# Patient Record
Sex: FEMALE | Race: WHITE | Employment: FULL TIME | URBAN - METROPOLITAN AREA
[De-identification: names, ages, dates, MRNs, and addresses within clinical notes are randomized per-mention and may not be internally consistent; named-entity substitution may affect disease eponyms.]

---

## 2017-01-16 ENCOUNTER — HOSPITAL ENCOUNTER (EMERGENCY)
Age: 57
Discharge: HOME | End: 2017-01-16
Attending: EMERGENCY MEDICINE
Payer: COMMERCIAL

## 2017-01-16 ENCOUNTER — APPOINTMENT (OUTPATIENT)
Dept: GENERAL RADIOLOGY | Age: 57
End: 2017-01-16
Payer: COMMERCIAL

## 2017-01-16 ENCOUNTER — APPOINTMENT (OUTPATIENT)
Dept: CT IMAGING | Age: 57
End: 2017-01-16
Attending: EMERGENCY MEDICINE
Payer: COMMERCIAL

## 2017-01-16 VITALS
RESPIRATION RATE: 16 BRPM | BODY MASS INDEX: 29.99 KG/M2 | OXYGEN SATURATION: 98 % | WEIGHT: 180 LBS | DIASTOLIC BLOOD PRESSURE: 98 MMHG | HEART RATE: 67 BPM | HEIGHT: 65 IN | SYSTOLIC BLOOD PRESSURE: 160 MMHG

## 2017-01-16 DIAGNOSIS — R42 DIZZINESS: Primary | ICD-10-CM

## 2017-01-16 DIAGNOSIS — I10 ESSENTIAL HYPERTENSION: ICD-10-CM

## 2017-01-16 LAB
ALBUMIN SERPL BCP-MCNC: 3.4 G/DL (ref 3.5–5)
ALBUMIN/GLOB SERPL: 1 {RATIO} (ref 1.1–2.2)
ALP SERPL-CCNC: 95 U/L (ref 45–117)
ALT SERPL-CCNC: 39 U/L (ref 12–78)
ANION GAP BLD CALC-SCNC: 7 MMOL/L (ref 5–15)
APPEARANCE UR: ABNORMAL
AST SERPL W P-5'-P-CCNC: 14 U/L (ref 15–37)
BACTERIA URNS QL MICRO: NEGATIVE /HPF
BASOPHILS # BLD AUTO: 0 K/UL (ref 0–0.1)
BASOPHILS # BLD: 1 % (ref 0–1)
BILIRUB SERPL-MCNC: 0.2 MG/DL (ref 0.2–1)
BILIRUB UR QL: NEGATIVE
BUN SERPL-MCNC: 24 MG/DL (ref 6–20)
BUN/CREAT SERPL: 19 (ref 12–20)
CALCIUM SERPL-MCNC: 8.5 MG/DL (ref 8.5–10.1)
CHLORIDE SERPL-SCNC: 107 MMOL/L (ref 97–108)
CK SERPL-CCNC: 37 U/L (ref 26–192)
CO2 SERPL-SCNC: 28 MMOL/L (ref 21–32)
COLOR UR: ABNORMAL
CREAT SERPL-MCNC: 1.29 MG/DL (ref 0.55–1.02)
D DIMER PPP FEU-MCNC: 0.19 MG/L FEU (ref 0–0.65)
EOSINOPHIL # BLD: 0.1 K/UL (ref 0–0.4)
EOSINOPHIL NFR BLD: 2 % (ref 0–7)
EPITH CASTS URNS QL MICRO: ABNORMAL /LPF
ERYTHROCYTE [DISTWIDTH] IN BLOOD BY AUTOMATED COUNT: 13.1 % (ref 11.5–14.5)
GLOBULIN SER CALC-MCNC: 3.4 G/DL (ref 2–4)
GLUCOSE SERPL-MCNC: 96 MG/DL (ref 65–100)
GLUCOSE UR STRIP.AUTO-MCNC: NEGATIVE MG/DL
HCT VFR BLD AUTO: 41.1 % (ref 35–47)
HGB BLD-MCNC: 13.6 G/DL (ref 11.5–16)
HGB UR QL STRIP: NEGATIVE
HYALINE CASTS URNS QL MICRO: ABNORMAL /LPF (ref 0–5)
KETONES UR QL STRIP.AUTO: NEGATIVE MG/DL
LEUKOCYTE ESTERASE UR QL STRIP.AUTO: ABNORMAL
LYMPHOCYTES # BLD AUTO: 29 % (ref 12–49)
LYMPHOCYTES # BLD: 1.9 K/UL (ref 0.8–3.5)
MCH RBC QN AUTO: 30.1 PG (ref 26–34)
MCHC RBC AUTO-ENTMCNC: 33.1 G/DL (ref 30–36.5)
MCV RBC AUTO: 90.9 FL (ref 80–99)
MONOCYTES # BLD: 0.4 K/UL (ref 0–1)
MONOCYTES NFR BLD AUTO: 6 % (ref 5–13)
NEUTS SEG # BLD: 4.1 K/UL (ref 1.8–8)
NEUTS SEG NFR BLD AUTO: 62 % (ref 32–75)
NITRITE UR QL STRIP.AUTO: NEGATIVE
PH UR STRIP: 6.5 [PH] (ref 5–8)
PLATELET # BLD AUTO: 203 K/UL (ref 150–400)
POTASSIUM SERPL-SCNC: 4.2 MMOL/L (ref 3.5–5.1)
PROT SERPL-MCNC: 6.8 G/DL (ref 6.4–8.2)
PROT UR STRIP-MCNC: NEGATIVE MG/DL
RBC # BLD AUTO: 4.52 M/UL (ref 3.8–5.2)
RBC #/AREA URNS HPF: ABNORMAL /HPF (ref 0–5)
SODIUM SERPL-SCNC: 142 MMOL/L (ref 136–145)
SP GR UR REFRACTOMETRY: 1.01 (ref 1–1.03)
TROPONIN I SERPL-MCNC: <0.04 NG/ML
UA: UC IF INDICATED,UAUC: ABNORMAL
UROBILINOGEN UR QL STRIP.AUTO: 1 EU/DL (ref 0.2–1)
WBC # BLD AUTO: 6.5 K/UL (ref 3.6–11)
WBC URNS QL MICRO: ABNORMAL /HPF (ref 0–4)

## 2017-01-16 PROCEDURE — 84484 ASSAY OF TROPONIN QUANT: CPT | Performed by: EMERGENCY MEDICINE

## 2017-01-16 PROCEDURE — 74011250637 HC RX REV CODE- 250/637: Performed by: EMERGENCY MEDICINE

## 2017-01-16 PROCEDURE — 96361 HYDRATE IV INFUSION ADD-ON: CPT

## 2017-01-16 PROCEDURE — 87086 URINE CULTURE/COLONY COUNT: CPT | Performed by: EMERGENCY MEDICINE

## 2017-01-16 PROCEDURE — 93005 ELECTROCARDIOGRAM TRACING: CPT

## 2017-01-16 PROCEDURE — 74011250636 HC RX REV CODE- 250/636: Performed by: EMERGENCY MEDICINE

## 2017-01-16 PROCEDURE — 71020 XR CHEST PA LAT: CPT

## 2017-01-16 PROCEDURE — 85025 COMPLETE CBC W/AUTO DIFF WBC: CPT | Performed by: EMERGENCY MEDICINE

## 2017-01-16 PROCEDURE — 85379 FIBRIN DEGRADATION QUANT: CPT | Performed by: EMERGENCY MEDICINE

## 2017-01-16 PROCEDURE — 80053 COMPREHEN METABOLIC PANEL: CPT | Performed by: EMERGENCY MEDICINE

## 2017-01-16 PROCEDURE — 36415 COLL VENOUS BLD VENIPUNCTURE: CPT | Performed by: EMERGENCY MEDICINE

## 2017-01-16 PROCEDURE — 82550 ASSAY OF CK (CPK): CPT | Performed by: EMERGENCY MEDICINE

## 2017-01-16 PROCEDURE — 70450 CT HEAD/BRAIN W/O DYE: CPT

## 2017-01-16 PROCEDURE — 81001 URINALYSIS AUTO W/SCOPE: CPT | Performed by: EMERGENCY MEDICINE

## 2017-01-16 PROCEDURE — 96374 THER/PROPH/DIAG INJ IV PUSH: CPT

## 2017-01-16 PROCEDURE — 99284 EMERGENCY DEPT VISIT MOD MDM: CPT

## 2017-01-16 RX ORDER — ACETAMINOPHEN 325 MG/1
TABLET ORAL
Status: DISCONTINUED
Start: 2017-01-16 | End: 2017-01-17 | Stop reason: HOSPADM

## 2017-01-16 RX ORDER — ONDANSETRON 2 MG/ML
4 INJECTION INTRAMUSCULAR; INTRAVENOUS
Status: COMPLETED | OUTPATIENT
Start: 2017-01-16 | End: 2017-01-16

## 2017-01-16 RX ORDER — MECLIZINE HCL 12.5 MG 12.5 MG/1
25 TABLET ORAL
Status: COMPLETED | OUTPATIENT
Start: 2017-01-16 | End: 2017-01-16

## 2017-01-16 RX ORDER — DULOXETIN HYDROCHLORIDE 30 MG/1
120 CAPSULE, DELAYED RELEASE ORAL DAILY
COMMUNITY
End: 2019-08-08 | Stop reason: SDUPTHER

## 2017-01-16 RX ORDER — MECLIZINE HYDROCHLORIDE 25 MG/1
25 TABLET ORAL
Qty: 30 TAB | Refills: 0 | Status: SHIPPED | OUTPATIENT
Start: 2017-01-16 | End: 2017-01-17

## 2017-01-16 RX ORDER — ONDANSETRON 4 MG/1
4 TABLET, ORALLY DISINTEGRATING ORAL
Qty: 10 TAB | Refills: 0 | Status: SHIPPED | OUTPATIENT
Start: 2017-01-16 | End: 2017-01-17

## 2017-01-16 RX ORDER — ACETAMINOPHEN 500 MG
1000 TABLET ORAL
Status: COMPLETED | OUTPATIENT
Start: 2017-01-16 | End: 2017-01-16

## 2017-01-16 RX ADMIN — MECLIZINE 25 MG: 12.5 TABLET ORAL at 18:52

## 2017-01-16 RX ADMIN — SODIUM CHLORIDE 1000 ML: 900 INJECTION, SOLUTION INTRAVENOUS at 18:52

## 2017-01-16 RX ADMIN — ACETAMINOPHEN 1000 MG: 500 TABLET ORAL at 17:26

## 2017-01-16 RX ADMIN — ONDANSETRON 4 MG: 2 INJECTION INTRAMUSCULAR; INTRAVENOUS at 18:52

## 2017-01-17 ENCOUNTER — OFFICE VISIT (OUTPATIENT)
Dept: NEUROLOGY | Age: 57
End: 2017-01-17

## 2017-01-17 VITALS
SYSTOLIC BLOOD PRESSURE: 140 MMHG | BODY MASS INDEX: 31.36 KG/M2 | HEIGHT: 65 IN | DIASTOLIC BLOOD PRESSURE: 80 MMHG | WEIGHT: 188.2 LBS | OXYGEN SATURATION: 98 % | HEART RATE: 67 BPM

## 2017-01-17 DIAGNOSIS — I10 ESSENTIAL HYPERTENSION: ICD-10-CM

## 2017-01-17 DIAGNOSIS — E53.8 VITAMIN B12 DEFICIENCY: ICD-10-CM

## 2017-01-17 DIAGNOSIS — F41.8 DEPRESSION WITH ANXIETY: ICD-10-CM

## 2017-01-17 DIAGNOSIS — R41.3 MEMORY LOSS: Primary | ICD-10-CM

## 2017-01-17 DIAGNOSIS — R55 SYNCOPE, UNSPECIFIED SYNCOPE TYPE: ICD-10-CM

## 2017-01-17 LAB
ATRIAL RATE: 76 BPM
CALCULATED P AXIS, ECG09: 66 DEGREES
CALCULATED R AXIS, ECG10: 38 DEGREES
CALCULATED T AXIS, ECG11: 51 DEGREES
DIAGNOSIS, 93000: NORMAL
P-R INTERVAL, ECG05: 158 MS
Q-T INTERVAL, ECG07: 416 MS
QRS DURATION, ECG06: 104 MS
QTC CALCULATION (BEZET), ECG08: 468 MS
VENTRICULAR RATE, ECG03: 76 BPM

## 2017-01-17 RX ORDER — QUETIAPINE FUMARATE 50 MG/1
50 TABLET, FILM COATED ORAL
COMMUNITY
End: 2019-06-07

## 2017-01-17 NOTE — MR AVS SNAPSHOT
Visit Information Date & Time Provider Department Dept. Phone Encounter #  
 1/17/2017 11:00 AM Mg Ramírez NP [facility] [phone] [id number] Upcoming Health Maintenance Date Due Hepatitis C Screening 1960 DTaP/Tdap/Td series (1 - Tdap) 4/22/1981 FOBT Q 1 YEAR AGE 50-75 4/22/2010 BREAST CANCER SCRN MAMMOGRAM 4/3/2011 PAP AKA CERVICAL CYTOLOGY 5/15/2014 INFLUENZA AGE 9 TO ADULT 8/1/2016 Allergies as of 1/17/2017  Review Complete On: 1/17/2017 By: Mayank Burger Severity Noted Reaction Type Reactions Lisinopril  03/25/2014    Cough Lortab [Hydrocodone-acetaminophen]  07/10/2012    Itching Morphine  10/06/2011    Nausea and Vomiting Current Immunizations  Reviewed on 3/25/2014 Name Date Influenza Vaccine Split 10/15/2012 Not reviewed this visit You Were Diagnosed With   
  
 Codes Comments Memory loss    -  Primary ICD-10-CM: R41.3 ICD-9-CM: 780.93 Spells     ICD-10-CM: R68.89 ICD-9-CM: 780.99 Vitamin B12 deficiency     ICD-10-CM: E53.8 ICD-9-CM: 266.2 Vitals BP Pulse Height(growth percentile) Weight(growth percentile) SpO2 BMI  
 140/80 67 5' 5\" (1.651 m) 188 lb 3.2 oz (85.4 kg) 98% 31.32 kg/m2 OB Status Smoking Status Ablation Former Smoker BMI and BSA Data Body Mass Index Body Surface Area  
 31.32 kg/m 2 1.98 m 2 Preferred Pharmacy Pharmacy Name Phone [facility] [address] [place], [place] - [address] [place] [address] [phone] Your Updated Medication List  
  
   
This list is accurate as of: 1/17/17 11:42 AM.  Always use your most recent med list.  
  
  
  
  
 CYMBALTA 30 mg capsule Generic drug:  DULoxetine Take 120 mg by mouth daily. hydroCHLOROthiazide 25 mg tablet Commonly known as:  HYDRODIURIL Take 1 Tab by mouth daily. levothyroxine 100 mcg tablet Commonly known as:  SYNTHROID Take 1 Tab by mouth Daily (before breakfast). LORazepam 0.5 mg tablet Commonly known as:  ATIVAN  
TAKE ONE TO TWO TABLETS BY MOUTH EVERY 12 HOURS AS NEEDED FOR ANXIETY  
  
 pantoprazole 40 mg tablet Commonly known as:  PROTONIX  
TAKE ONE TABLET BY MOUTH EVERY DAY  
  
 QUEtiapine 50 mg tablet Commonly known as:  SEROquel Take 50 mg by mouth nightly. TAZTIA  mg SR capsule Generic drug:  dilTIAZem TAKE ONE CAPSULE BY MOUTH EVERY DAY We Performed the Following REFERRAL TO NEUROPSYCHOLOGY [RVE24 Custom] Comments:  
 Memory loss, anxiety, PTSD, r/o dementia, pseudodementia VITAMIN B12 & FOLATE [72283 CPT(R)] To-Do List   
 01/18/2017 Neurology:  EEG   
  
 01/18/2017 Imaging:  MRI BRAIN W WO CONT Referral Information Referral ID Referred By Referred To  
  
 [id number] Anastasiia Burt V Not Available Visits Status Start Date End Date 1 New Request 1/17/17 1/17/18 If your referral has a status of pending review or denied, additional information will be sent to support the outcome of this decision. Referral ID Referred By Referred To  
 [id number] Aliya BYNUM 82 Eron Mcintyre [address] [facility] [address] [facility], [address] [facility] Phone: [phone] Fax: [phone] Visits Status Start Date End Date 1 New Request 1/17/17 1/17/18 If your referral has a status of pending review or denied, additional information will be sent to support the outcome of this decision. Referral ID Referred By Referred To  
 [id number] Anastasiia Burt V Not Available Visits Status Start Date End Date 1 New Request 1/17/17 1/17/18 If your referral has a status of pending review or denied, additional information will be sent to support the outcome of this decision. Patient Instructions A Healthy Lifestyle: Care Instructions Your Care Instructions A healthy lifestyle can help you feel good, stay at a healthy weight, and have plenty of energy for both work and play. A healthy lifestyle is something you can share with your whole family. A healthy lifestyle also can lower your risk for serious health problems, such as high blood pressure, heart disease, and diabetes. You can follow a few steps listed below to improve your health and the health of your family. Follow-up care is a key part of your treatment and safety. Be sure to make and go to all appointments, and call your doctor if you are having problems. Its also a good idea to know your test results and keep a list of the medicines you take. How can you care for yourself at home? · Do not eat too much sugar, fat, or fast foods. You can still have dessert and treats now and then. The goal is moderation. · Start small to improve your eating habits. Pay attention to portion sizes, drink less juice and soda pop, and eat more fruits and vegetables. ¨ Eat a healthy amount of food. A 3-ounce serving of meat, for example, is about the size of a deck of cards. Fill the rest of your plate with vegetables and whole grains. ¨ Limit the amount of soda and sports drinks you have every day. Drink more water when you are thirsty. ¨ Eat at least 5 servings of fruits and vegetables every day. It may seem like a lot, but it is not hard to reach this goal. A serving or helping is 1 piece of fruit, 1 cup of vegetables, or 2 cups of leafy, raw vegetables. Have an apple or some carrot sticks as an afternoon snack instead of a candy bar. Try to have fruits and/or vegetables at every meal. 
· Make exercise part of your daily routine. You may want to start with simple activities, such as walking, bicycling, or slow swimming. Try to be active 30 to 60 minutes every day. You do not need to do all 30 to 60 minutes all at once.  For example, you can exercise 3 times a day for 10 or 20 minutes. Moderate exercise is safe for most people, but it is always a good idea to talk to your doctor before starting an exercise program. 
· Keep moving. Jose Cluck the lawn, work in the garden, or [employer]. Take the stairs instead of the elevator at work. · If you smoke, quit. People who smoke have an increased risk for heart attack, stroke, cancer, and other lung illnesses. Quitting is hard, but there are ways to boost your chance of quitting tobacco for good. ¨ Use nicotine gum, patches, or lozenges. ¨ Ask your doctor about stop-smoking programs and medicines. ¨ Keep trying. In addition to reducing your risk of diseases in the future, you will notice some benefits soon after you stop using tobacco. If you have shortness of breath or asthma symptoms, they will likely get better within a few weeks after you quit. · Limit how much alcohol you drink. Moderate amounts of alcohol (up to 2 drinks a day for men, 1 drink a day for women) are okay. But drinking too much can lead to liver problems, high blood pressure, and other health problems. Family health If you have a family, there are many things you can do together to improve your health. · Eat meals together as a family as often as possible. · Eat healthy foods. This includes fruits, vegetables, lean meats and dairy, and whole grains. · Include your family in your fitness plan. Most people think of activities such as jogging or tennis as the way to fitness, but there are many ways you and your family can be more active. Anything that makes you breathe hard and gets your heart pumping is exercise. Here are some tips: 
¨ Walk to do errands or to take your child to school or the bus. ¨ Go for a family bike ride after dinner instead of watching TV. Where can you learn more? Go to http://alphonse-xochitl.info/. Enter O453 in the search box to learn more about \"A Healthy Lifestyle: Care Instructions. \" Current as of: July 26, 2016 Content Version: 11.1 © 5125-9001 [employer]. Care instructions adapted under license by [employer] (which disclaims liability or warranty for this information). If you have questions about a medical condition or this instruction, always ask your healthcare professional. [address] any warranty or liability for your use of this information. A Healthy Lifestyle: Care Instructions Your Care Instructions A healthy lifestyle can help you feel good, stay at a healthy weight, and have plenty of energy for both work and play. A healthy lifestyle is something you can share with your whole family. A healthy lifestyle also can lower your risk for serious health problems, such as high blood pressure, heart disease, and diabetes. You can follow a few steps listed below to improve your health and the health of your family. Follow-up care is a key part of your treatment and safety. Be sure to make and go to all appointments, and call your doctor if you are having problems. Its also a good idea to know your test results and keep a list of the medicines you take. How can you care for yourself at home? · Do not eat too much sugar, fat, or fast foods. You can still have dessert and treats now and then. The goal is moderation. · Start small to improve your eating habits. Pay attention to portion sizes, drink less juice and soda pop, and eat more fruits and vegetables. ¨ Eat a healthy amount of food. A 3-ounce serving of meat, for example, is about the size of a deck of cards. Fill the rest of your plate with vegetables and whole grains. ¨ Limit the amount of soda and sports drinks you have every day. Drink more water when you are thirsty. ¨ Eat at least 5 servings of fruits and vegetables every day. It may seem like a lot, but it is not hard to reach this goal. A serving or helping is 1 piece of fruit, 1 cup of vegetables, or 2 cups of leafy, raw vegetables. Have an apple or some carrot sticks as an afternoon snack instead of a candy bar. Try to have fruits and/or vegetables at every meal. 
· Make exercise part of your daily routine. You may want to start with simple activities, such as walking, bicycling, or slow swimming. Try to be active 30 to 60 minutes every day. You do not need to do all 30 to 60 minutes all at once. For example, you can exercise 3 times a day for 10 or 20 minutes. Moderate exercise is safe for most people, but it is always a good idea to talk to your doctor before starting an exercise program. 
· Keep moving. Eliane Folk the lawn, work in the garden, or [employer]. Take the stairs instead of the elevator at work. · If you smoke, quit. People who smoke have an increased risk for heart attack, stroke, cancer, and other lung illnesses. Quitting is hard, but there are ways to boost your chance of quitting tobacco for good. ¨ Use nicotine gum, patches, or lozenges. ¨ Ask your doctor about stop-smoking programs and medicines. ¨ Keep trying. In addition to reducing your risk of diseases in the future, you will notice some benefits soon after you stop using tobacco. If you have shortness of breath or asthma symptoms, they will likely get better within a few weeks after you quit. · Limit how much alcohol you drink. Moderate amounts of alcohol (up to 2 drinks a day for men, 1 drink a day for women) are okay. But drinking too much can lead to liver problems, high blood pressure, and other health problems. Family health If you have a family, there are many things you can do together to improve your health. · Eat meals together as a family as often as possible. · Eat healthy foods. This includes fruits, vegetables, lean meats and dairy, and whole grains. · Include your family in your fitness plan.  Most people think of activities such as jogging or tennis as the way to fitness, but there are many ways you and your family can be more active. Anything that makes you breathe hard and gets your heart pumping is exercise. Here are some tips: 
¨ Walk to do errands or to take your child to school or the bus. ¨ Go for a family bike ride after dinner instead of watching TV. Where can you learn more? Go to http://alphonse-xochitl.info/. Enter V847 in the search box to learn more about \"A Healthy Lifestyle: Care Instructions. \" Current as of: July 26, 2016 Content Version: 11.1 © 4975-8757 [employer]. Care instructions adapted under license by [employer] (which disclaims liability or warranty for this information). If you have questions about a medical condition or this instruction, always ask your healthcare professional. [address] any warranty or liability for your use of this information. Introducing [facility]! Dear Gabriela Castelan: 
Thank you for requesting a [employer] account. Our records indicate that you already have an active [employer] account. You can access your account anytime at [email or web address] Did you know that you can access your hospital and ER discharge instructions at any time in Invested[employer]in? You can also review all of your test results from your hospital stay or ER visit. Additional Information If you have questions, please visit the Frequently Asked Questions section of the [email or web address] website at [email or web address] Remember, [email or web address][employer]in is NOT to be used for urgent needs. For medical emergencies, dial 911. Now available from your iPhone and Android! Please provide this summary of care documentation to your next provider. Your primary care clinician is listed as Juan C Presley. If you have any questions after today's visit, please call [phone].

## 2017-01-17 NOTE — DISCHARGE INSTRUCTIONS
Dizziness: Care Instructions  Your Care Instructions  Dizziness is the feeling of unsteadiness or fuzziness in your head. It is different than having vertigo, which is a feeling that the room is spinning or that you are moving or falling. It is also different from lightheadedness, which is the feeling that you are about to faint. It can be hard to know what causes dizziness. Some people feel dizzy when they have migraine headaches. Sometimes bouts of flu can make you feel dizzy. Some medical conditions, such as heart problems or high blood pressure, can make you feel dizzy. Many medicines can cause dizziness, including medicines for high blood pressure, pain, or anxiety. If a medicine causes your symptoms, your doctor may recommend that you stop or change the medicine. If it is a problem with your heart, you may need medicine to help your heart work better. If there is no clear reason for your symptoms, your doctor may suggest watching and waiting for a while to see if the dizziness goes away on its own. Follow-up care is a key part of your treatment and safety. Be sure to make and go to all appointments, and call your doctor if you are having problems. It's also a good idea to know your test results and keep a list of the medicines you take. How can you care for yourself at home? · If your doctor recommends or prescribes medicine, take it exactly as directed. Call your doctor if you think you are having a problem with your medicine. · Do not drive while you feel dizzy. · Try to prevent falls. Steps you can take include:  ¨ Using nonskid mats, adding grab bars near the tub, and using night-lights. ¨ Clearing your home so that walkways are free of anything you might trip on. ¨ Letting family and friends know that you have been feeling dizzy. This will help them know how to help you. When should you call for help? Call 911 anytime you think you may need emergency care.  For example, call if:  · You passed out (lost consciousness). · You have dizziness along with symptoms of a heart attack. These may include:  ¨ Chest pain or pressure, or a strange feeling in the chest.  ¨ Sweating. ¨ Shortness of breath. ¨ Nausea or vomiting. ¨ Pain, pressure, or a strange feeling in the back, neck, jaw, or upper belly or in one or both shoulders or arms. ¨ Lightheadedness or sudden weakness. ¨ A fast or irregular heartbeat. · You have symptoms of a stroke. These may include:  ¨ Sudden numbness, tingling, weakness, or loss of movement in your face, arm, or leg, especially on only one side of your body. ¨ Sudden vision changes. ¨ Sudden trouble speaking. ¨ Sudden confusion or trouble understanding simple statements. ¨ Sudden problems with walking or balance. ¨ A sudden, severe headache that is different from past headaches. Call your doctor now or seek immediate medical care if:  · You feel dizzy and have a fever, headache, or ringing in your ears. · You have new or increased nausea and vomiting. · Your dizziness does not go away or comes back. Watch closely for changes in your health, and be sure to contact your doctor if:  · You do not get better as expected. Where can you learn more? Go to http://alphonse-xochitl.info/. Enter Z413 in the search box to learn more about \"Dizziness: Care Instructions. \"  Current as of: May 27, 2016  Content Version: 11.1  © 2008-9777 [employer]. Care instructions adapted under license by [employer] (which disclaims liability or warranty for this information). If you have questions about a medical condition or this instruction, always ask your healthcare professional. [place] [address] any warranty or liability for your use of this information. High Blood Pressure: Care Instructions  Your Care Instructions  If your blood pressure is usually above 140/90, you have high blood pressure, or hypertension.  That means the top number is 140 or higher or the bottom number is 90 or higher, or both. Despite what a lot of people think, high blood pressure usually doesn't cause headaches or make you feel dizzy or lightheaded. It usually has no symptoms. But it does increase your risk for heart attack, stroke, and kidney or eye damage. The higher your blood pressure, the more your risk increases. Your doctor will give you a goal for your blood pressure. Your goal will be based on your health and your age. An example of a goal is to keep your blood pressure below 140/90. Lifestyle changes, such as eating healthy and being active, are always important to help lower blood pressure. You might also take medicine to reach your blood pressure goal.  Follow-up care is a key part of your treatment and safety. Be sure to make and go to all appointments, and call your doctor if you are having problems. It's also a good idea to know your test results and keep a list of the medicines you take. How can you care for yourself at home? Medical treatment  · If you stop taking your medicine, your blood pressure will go back up. You may take one or more types of medicine to lower your blood pressure. Be safe with medicines. Take your medicine exactly as prescribed. Call your doctor if you think you are having a problem with your medicine. · Talk to your doctor before you start taking aspirin every day. Aspirin can help certain people lower their risk of a heart attack or stroke. But taking aspirin isn't right for everyone, because it can cause serious bleeding. · See your doctor regularly. You may need to see the doctor more often at first or until your blood pressure comes down. · If you are taking blood pressure medicine, talk to your doctor before you take decongestants or anti-inflammatory medicine, such as ibuprofen. Some of these medicines can raise blood pressure. · Learn how to check your blood pressure at home.   Lifestyle changes  · Stay at a healthy weight. This is especially important if you put on weight around the waist. Losing even 10 pounds can help you lower your blood pressure. · If your doctor recommends it, get more exercise. Walking is a good choice. Bit by bit, increase the amount you walk every day. Try for at least 30 minutes on most days of the week. You also may want to swim, bike, or do other activities. · Avoid or limit alcohol. Talk to your doctor about whether you can drink any alcohol. · Try to limit how much sodium you eat to less than 2,300 milligrams (mg) a day. Your doctor may ask you to try to eat less than 1,500 mg a day. · Eat plenty of fruits (such as bananas and oranges), vegetables, legumes, whole grains, and low-fat dairy products. · Lower the amount of saturated fat in your diet. Saturated fat is found in animal products such as milk, cheese, and meat. Limiting these foods may help you lose weight and also lower your risk for heart disease. · Do not smoke. Smoking increases your risk for heart attack and stroke. If you need help quitting, talk to your doctor about stop-smoking programs and medicines. These can increase your chances of quitting for good. When should you call for help? Call 911 anytime you think you may need emergency care. This may mean having symptoms that suggest that your blood pressure is causing a serious heart or blood vessel problem. Your blood pressure may be over 180/110. For example, call 911 if:  · You have symptoms of a heart attack. These may include:  ¨ Chest pain or pressure, or a strange feeling in the chest.  ¨ Sweating. ¨ Shortness of breath. ¨ Nausea or vomiting. ¨ Pain, pressure, or a strange feeling in the back, neck, jaw, or upper belly or in one or both shoulders or arms. ¨ Lightheadedness or sudden weakness. ¨ A fast or irregular heartbeat. · You have symptoms of a stroke.  These may include:  ¨ Sudden numbness, tingling, weakness, or loss of movement in your face, arm, or leg, especially on only one side of your body. ¨ Sudden vision changes. ¨ Sudden trouble speaking. ¨ Sudden confusion or trouble understanding simple statements. ¨ Sudden problems with walking or balance. ¨ A sudden, severe headache that is different from past headaches. · You have severe back or belly pain. Do not wait until your blood pressure comes down on its own. Get help right away. Call your doctor now or seek immediate care if:  · Your blood pressure is much higher than normal (such as 180/110 or higher), but you don't have symptoms. · You think high blood pressure is causing symptoms, such as:  ¨ Severe headache. ¨ Blurry vision. Watch closely for changes in your health, and be sure to contact your doctor if:  · Your blood pressure measures 140/90 or higher at least 2 times. That means the top number is 140 or higher or the bottom number is 90 or higher, or both. · You think you may be having side effects from your blood pressure medicine. · Your blood pressure is usually normal, but it goes above normal at least 2 times. Where can you learn more? Go to http://alphonseOktalogicxochitl.info/. Enter I078 in the search box to learn more about \"High Blood Pressure: Care Instructions. \"  Current as of: August 8, 2016  Content Version: 11.1  © 5928-7026 [employer]. Care instructions adapted under license by [employer] (which disclaims liability or warranty for this information). If you have questions about a medical condition or this instruction, always ask your healthcare professional. [place] [address] any warranty or liability for your use of this information. Epley Maneuver for Vertigo: Exercises  Your Care Instructions  The Epley Maneuver is a series of movements your doctor may use to treat your vertigo. Here are the steps for the exercises. Your doctor or physical therapist will guide you through the movements.   A single 10- to 15-minute session often is all that's needed. Crystal debris (canaliths) cause the vertigo. When your head is moved into different positions, the debris moves freely. This may cause your symptoms to stop. How to do the exercises  Step 1    · You will sit on the doctor's exam table. Your legs will be out in front of you. The doctor or physical therapist will turn your head so that it is assisted between looking straight ahead and looking to the side that causes the worst vertigo. · Without changing your head position, he or she will guide you back quickly. Your shoulders will be on the table. Your head will hang over the edge of the table. At this point, the side of your head that is causing the worst vertigo will face the floor. You'll stay in this position for 30 seconds or until your symptoms stop. Step 2    · Then, the doctor or physical therapist will turn your head to the other side. You don't need to lift your head. The other side of your head will face the floor. You will stay in this position for 30 seconds or until your symptoms stop. Step 3    · The doctor or physical therapist will help you roll your body in the same direction that your head is facing. You will lie on your side. (For example, if you are looking to your right, you will roll onto your right side.) The side that causes the worst symptoms should be facing up. You'll stay in this position for another 30 seconds or until your symptoms stop. Step 4    · The doctor or physical therapist will then help you to sit back up. Your legs will hang off the table on the same side that you were facing. Follow-up care is a key part of your treatment and safety. Be sure to make and go to all appointments, and call your doctor if you are having problems. It's also a good idea to know your test results and keep a list of the medicines you take. Where can you learn more? Go to http://alphonse-xochitl.info/.   Enter [id number] in the search box to learn more about \"Epley Maneuver for Vertigo: Exercises. \"  Current as of: July 29, 2016  Content Version: 11.1  © [id number] [employer]. Care instructions adapted under license by [employer] (which disclaims liability or warranty for this information). If you have questions about a medical condition or this instruction, always ask your healthcare professional. [place] [address] any warranty or liability for your use of this information.

## 2017-01-17 NOTE — ED NOTES
Genia Crawley reviewed discharge instructions with the patient. The patient verbalized understanding. All questions and concerns were addressed. The patient is discharged ambulatory in the care of family members with instructions and prescriptions in hand. Pt is alert and oriented x 4. Respirations are clear and unlabored.

## 2017-01-17 NOTE — ED NOTES
Pt walked in hallway without difficulty. Pt stated she is only slightly dizzy but feels much better and is ready to go home.

## 2017-01-17 NOTE — ED PROVIDER NOTES
HPI Comments: Rahul Carvalho is a 64 y.o. female with PMHx significant for hypertensive heart disease, thyroid disease, HTN, hiatal hernia, LVH, stroke, GERD, arthritis, fatigue, kidney disease, headache, vertigo and SOB on exertion who presents ambulatory to the ED c/o constant, \"aching,\" non-radiating, 5/10, headache since this morning. Pt also presents with additional sx's of  nausea, fatigue, SOB, dizziness, and tremors since PTA. Pt reports that she was referred to present to the ED by her cardiologist because pt has a high blood pressure since 1/12/17. Pt notes that she presented to [facility] for her sx's, but states that \"they didn't do much. \" Pt reports that she had a \"dizzy spill\" this morning, and states that her dizziness is exacerbated by any movement, and states that her dizziness is relieved when she does not move. Pt states that her SOB is exacerbated with movement, and states that the SOB is relieved when she sits down. Pt denies any hx of blood clots, heart attacks, lung issues, or any recent changes to her medication. Pt also notes that she has a neurologist appointment at the end of January, and states that she has an appointment her cardiologist on 1/19/17. Pt also notes that she regularly takes a medication for her blood pressure at 1900, and requests the medication in the ED. Pt specifically denies cough, congestion, appetite changes, blood in stool, and chest pain. PCP: Sally Freeman MD  Social Hx: + tobacco usage, - EtOH, - Illicit Drugs    There are no other complaints, changes or physical findings at this time. This note is prepared by Vibha Lloyd, acting as Scribe for Gabriel Mehta DO. The history is provided by the patient and a relative. No  was used.         Past Medical History:   Diagnosis Date    Anxiety disorder     Arthritis      OSTEO     Chronic pain      right knee arthritis    Depression     Dyspepsia and other specified disorders of function of stomach     Fatigue     Gastrointestinal disorder      ischemic bowel    GERD (gastroesophageal reflux disease)     Headache     Hearing reduced     Heart disease     Hiatal hernia     Hypertension     Ischemic colon ([facility] [address] 2009    Joint pain     Kidney disease     Leg swelling     LVH (left ventricular hypertrophy) due to hypertensive disease      dr Samira Sidhu   [facility] Morbid obesity ([facility] [address]     Muscle pain     Postmenopausal     Psychiatric disorder      ANXIETY AND DEPRESSION    Short of breath on exertion     Stool color black     Stroke (HCC)      TIA    Thyroid disease     Venous stasis     Vertigo     Vision decreased        Past Surgical History:   Procedure Laterality Date    Hx cholecystectomy      Endoscopy, colon, diagnostic       3/09-[facility] repeat q 3 for ischemic colitis    Colonoscopy,diagnostic  12/31/2012          Hx gyn  [id number]     2 c-sections    Hx gyn  2009     ABLATION    Hx wisdom teeth extraction      Hx lap gastric bypass           Family History:   Problem Relation Age of Onset    Heart Disease Mother      PULM HTN    Stroke Mother     Hypertension Mother     Colon Polyps Mother     Kidney Disease Mother      DIALYSIS PT    Thyroid Disease Mother     Other Mother      BLOOD CLOT HX    Diabetes Father     Hypertension Father     Colon Polyps Father     Heart Disease Father     Psychiatric Disorder Father      ALZEHEIMERS    Diabetes Brother     Hypertension Brother     Kidney Disease Maternal Aunt      DIALYSIS    Kidney Disease Maternal Uncle     Kidney Disease Maternal Grandmother     Heart Disease Maternal Grandmother     Diabetes Paternal Grandfather     Kidney Disease Maternal Aunt      DIALYSIS    Kidney Disease Maternal Uncle      DIALYSIS    Colon Polyps Maternal Grandfather        Social History     Social History    Marital status:      Spouse name: N/A    Number of children: N/A    Years of education: N/A     Occupational History    Not on file. Social History Main Topics    Smoking status: Former Smoker     Packs/day: 1.00     Years: 30.00     Quit date: 11/28/2012    Smokeless tobacco: Never Used    Alcohol use No    Drug use: No    Sexual activity: Yes     Other Topics Concern    Not on file     Social History Narrative         ALLERGIES: Lisinopril; Lortab [hydrocodone-acetaminophen]; and Morphine    Review of Systems   Constitutional: Positive for fatigue. Negative for appetite change, chills and fever. HENT: Negative for congestion, rhinorrhea, sinus pressure and sore throat. Eyes: Negative. Respiratory: Positive for shortness of breath. Negative for cough, choking, chest tightness and wheezing. Cardiovascular: Negative for chest pain, palpitations and leg swelling.        + high blood pressure   Gastrointestinal: Positive for nausea. Negative for abdominal pain, constipation, diarrhea and vomiting. Endocrine: Negative. Genitourinary: Negative. Negative for difficulty urinating, dysuria, flank pain and urgency. Musculoskeletal: Negative. Skin: Negative. Neurological: Positive for dizziness, tremors and headaches. Negative for speech difficulty, weakness, light-headedness and numbness. Psychiatric/Behavioral: Negative. All other systems reviewed and are negative. Patient Vitals for the past 12 hrs:   Pulse Resp BP SpO2   01/16/17 1846 94 22 (!) 177/108 98 %   01/16/17 1559 86 22 (!) 192/102 99 %       Physical Exam   Constitutional: She is oriented to person, place, and time. She appears well-developed and well-nourished. No distress. HENT:   Head: Normocephalic and atraumatic. Mouth/Throat: Oropharynx is clear and moist. No oropharyngeal exudate. Eyes: Conjunctivae and EOM are normal. Pupils are equal, round, and reactive to light. Neck: Normal range of motion. Neck supple. No JVD present. No tracheal deviation present.    Cardiovascular: Normal rate, regular rhythm, normal heart sounds and intact distal pulses. No murmur heard. Pulmonary/Chest: Effort normal and breath sounds normal. No stridor. No respiratory distress. She has no wheezes. She has no rales. She exhibits no tenderness. Abdominal: Soft. She exhibits no distension. There is no tenderness. There is no rebound and no guarding. Musculoskeletal: Normal range of motion. She exhibits no edema or tenderness. Neurological: She is alert and oriented to person, place, and time. No cranial nerve deficit. No focal motor or sensory deficits, no nystagmus, no dysmetria, normal heel-shin, no ataxic gait     Skin: Skin is warm and dry. She is not diaphoretic. Psychiatric: She has a normal mood and affect. Her behavior is normal.   Nursing note and vitals reviewed. MDM  Number of Diagnoses or Management Options  Diagnosis management comments:     DDx: vertigo, ICH, stroke, PE, ACS       Amount and/or Complexity of Data Reviewed  Clinical lab tests: ordered and reviewed  Tests in the radiology section of CPT®: ordered and reviewed  Tests in the medicine section of CPT®: ordered and reviewed  Obtain history from someone other than the patient: yes (brother)  Review and summarize past medical records: yes  Independent visualization of images, tracings, or specimens: yes    Patient Progress  Patient progress: stable    ED Course       Procedures    ED EKG interpretation:  Rhythm: normal sinus rhythm; and regular . Rate (approx.): 76; Axis: normal; P wave: normal; QRS interval: normal ; ST/T wave: normal;. This EKG was interpreted by Uday Diallo DO,ED Provider. PROGRESS NOTE    8:32 PM  Pt was able to ambulate without any problems. Written by Roseline Robles ED Scribe, as dictated by Uday Diallo DO. PROGRESS NOTE  8:48 PM  Pt was re-assessed and still able to ambulate without any problems.  Pt reports that she has an appointment with cardiologist  on 1/19/17. Pt was instructed to take her blood pressure twice a day before her appointment with him. Pt also states that she has an appointment with a neurologist.  Written by MARYANN Barrera, as dictated by Tressa Stout DO.      LABORATORY TESTS:  Recent Results (from the past 12 hour(s))   EKG, 12 LEAD, INITIAL    Collection Time: 01/16/17  4:06 PM   Result Value Ref Range    Ventricular Rate 76 BPM    Atrial Rate 76 BPM    P-R Interval 158 ms    QRS Duration 104 ms    Q-T Interval 416 ms    QTC Calculation (Bezet) 468 ms    Calculated P Axis 66 degrees    Calculated R Axis 38 degrees    Calculated T Axis 51 degrees    Diagnosis       Normal sinus rhythm  Possible Left atrial enlargement  Borderline ECG  When compared with ECG of 12-MAR-2013 11:43,  No significant change was found     CBC WITH AUTOMATED DIFF    Collection Time: 01/16/17  4:40 PM   Result Value Ref Range    WBC 6.5 3.6 - 11.0 K/uL    RBC 4.52 3.80 - 5.20 M/uL    HGB 13.6 11.5 - 16.0 g/dL    HCT 41.1 35.0 - 47.0 %    MCV 90.9 80.0 - 99.0 FL    MCH 30.1 26.0 - 34.0 PG    MCHC 33.1 30.0 - 36.5 g/dL    RDW 13.1 11.5 - 14.5 %    PLATELET 331 287 - 116 K/uL    NEUTROPHILS 62 32 - 75 %    LYMPHOCYTES 29 12 - 49 %    MONOCYTES 6 5 - 13 %    EOSINOPHILS 2 0 - 7 %    BASOPHILS 1 0 - 1 %    ABS. NEUTROPHILS 4.1 1.8 - 8.0 K/UL    ABS. LYMPHOCYTES 1.9 0.8 - 3.5 K/UL    ABS. MONOCYTES 0.4 0.0 - 1.0 K/UL    ABS. EOSINOPHILS 0.1 0.0 - 0.4 K/UL    ABS.  BASOPHILS 0.0 0.0 - 0.1 K/UL   METABOLIC PANEL, COMPREHENSIVE    Collection Time: 01/16/17  4:40 PM   Result Value Ref Range    Sodium 142 136 - 145 mmol/L    Potassium 4.2 3.5 - 5.1 mmol/L    Chloride 107 97 - 108 mmol/L    CO2 28 21 - 32 mmol/L    Anion gap 7 5 - 15 mmol/L    Glucose 96 65 - 100 mg/dL    BUN 24 (H) 6 - 20 MG/DL    Creatinine 1.29 (H) 0.55 - 1.02 MG/DL    BUN/Creatinine ratio 19 12 - 20      GFR est AA 52 (L) >60 ml/min/1.73m2    GFR est non-AA 43 (L) >60 ml/min/1.73m2    Calcium 8.5 8.5 - 10.1 MG/DL    Bilirubin, total 0.2 0.2 - 1.0 MG/DL    ALT 39 12 - 78 U/L    AST 14 (L) 15 - 37 U/L    Alk. phosphatase 95 45 - 117 U/L    Protein, total 6.8 6.4 - 8.2 g/dL    Albumin 3.4 (L) 3.5 - 5.0 g/dL    Globulin 3.4 2.0 - 4.0 g/dL    A-G Ratio 1.0 (L) 1.1 - 2.2     CK W/ REFLX CKMB    Collection Time: 01/16/17  4:40 PM   Result Value Ref Range    CK 37 26 - 192 U/L   TROPONIN I    Collection Time: 01/16/17  4:40 PM   Result Value Ref Range    Troponin-I, Qt. <0.04 <0.05 ng/mL   URINALYSIS W/ REFLEX CULTURE    Collection Time: 01/16/17  6:51 PM   Result Value Ref Range    Color YELLOW/STRAW      Appearance CLOUDY (A) CLEAR      Specific gravity 1.015 1.003 - 1.030      pH (UA) 6.5 5.0 - 8.0      Protein NEGATIVE  NEG mg/dL    Glucose NEGATIVE  NEG mg/dL    Ketone NEGATIVE  NEG mg/dL    Bilirubin NEGATIVE  NEG      Blood NEGATIVE  NEG      Urobilinogen 1.0 0.2 - 1.0 EU/dL    Nitrites NEGATIVE  NEG      Leukocyte Esterase SMALL (A) NEG      WBC 5-10 0 - 4 /hpf    RBC 0-5 0 - 5 /hpf    Epithelial cells FEW FEW /lpf    Bacteria NEGATIVE  NEG /hpf    UA:UC IF INDICATED URINE CULTURE ORDERED (A) CNI      Hyaline Cast 0-2 0 - 5 /lpf   D DIMER    Collection Time: 01/16/17  6:51 PM   Result Value Ref Range    D-dimer 0.19 0.00 - 0.65 mg/L FEU       IMAGING RESULTS:  CT HEAD WO CONT   Final Result   EXAM: CT HEAD WITHOUT CONTRAST     INDICATION: Dizziness and hypertension.     COMPARISON: 10/28/2009.     CONTRAST: None.     TECHNIQUE: Unenhanced CT of the head was performed using 5 mm images. Brain and  bone windows were generated. Sagittal and coronal reformations were generated. CT dose reduction was achieved through use of a standardized protocol tailored  for this examination and automatic exposure control for dose modulation.  CT dose  reduction was achieved through use of a standardized protocol tailored for this  examination and automatic exposure control for dose modulation.     FINDINGS:  The ventricles and sulci are normal in size, shape and configuration and  midline. There is no significant white matter disease. There is no  intracranial hemorrhage. There is no extra-axial collection, mass, mass effect  or midline shift. The basilar cisterns are open. No acute infarct is  identified. The bone windows demonstrate no abnormalities. The visualized  portions of the paranasal sinuses and mastoid air cells are clear.     IMPRESSION  IMPRESSION: Normal unenhanced CT examination of the head. XR CHEST PA LAT   Final Result   EXAM: XR CHEST PA LAT.     INDICATION: Shortness of breath.     COMPARISON: 3/12/2013.     FINDINGS:   PA and lateral radiographs of the chest were obtained. The patient is on a  cardiac monitor. Lungs: The lungs are clear of mass, nodule, airspace disease or edema. Pleura: There is no pleural effusion or pneumothorax. Mediastinum: The cardiac and mediastinal contours and pulmonary vascularity are  normal. The aorta is minimally atherosclerotic. Bones and soft tissues: There are mild degenerative changes of the spine.     IMPRESSION  IMPRESSION: No acute abnormality. MEDICATIONS GIVEN:  Medications   acetaminophen (TYLENOL) 325 mg tablet (not administered)   acetaminophen (TYLENOL) tablet 1,000 mg (1,000 mg Oral Given 1/16/17 1726)   sodium chloride 0.9 % bolus infusion 1,000 mL (1,000 mL IntraVENous New Bag 1/16/17 1852)   meclizine (ANTIVERT) tablet 25 mg (25 mg Oral Given 1/16/17 1852)   ondansetron (ZOFRAN) injection 4 mg (4 mg IntraVENous Given 1/16/17 1852)       IMPRESSION:  1. Dizziness    2. Essential hypertension        PLAN:  1. Current Discharge Medication List      START taking these medications    Details   meclizine (ANTIVERT) 25 mg tablet Take 1 Tab by mouth three (3) times daily as needed for Dizziness. Qty: 30 Tab, Refills: 0      ondansetron (ZOFRAN ODT) 4 mg disintegrating tablet Take 1 Tab by mouth every eight (8) hours as needed for Nausea.   Qty: 10 Tab, Refills: 0 CONTINUE these medications which have NOT CHANGED    Details   DULoxetine (CYMBALTA) 30 mg capsule Take 120 mg by mouth daily. pantoprazole (PROTONIX) 40 mg tablet TAKE ONE TABLET BY MOUTH EVERY DAY  Qty: 30 tablet, Refills: 2      LORazepam (ATIVAN) 0.5 mg tablet TAKE ONE TO TWO TABLETS BY MOUTH EVERY 12 HOURS AS NEEDED FOR ANXIETY  Qty: 50 Tab, Refills: 0      levothyroxine (SYNTHROID) 100 mcg tablet Take 1 Tab by mouth Daily (before breakfast). Qty: 30 Tab, Refills: 1      hydrochlorothiazide (HYDRODIURIL) 25 mg tablet Take 1 Tab by mouth daily. Qty: 30 Tab, Refills: 11    Associated Diagnoses: HTN (hypertension)      TAZTIA  mg SR capsule TAKE ONE CAPSULE BY MOUTH EVERY DAY  Qty: 30 Cap, Refills: PRN             2.   Follow-up Information     Follow up With Details Comments [address], [place]   [facility]  [address] Yuri Ventura MD On 1/19/2017  [address] [facility] [address] [place]  [address]  [address] MD   [address] [address] [facility]  [address]  [address] [address] [phone]            Return to ED if worse       DISCHARGE NOTE  8:51 PM  The patient has been re-evaluated and is ready for discharge. Reviewed available results with patient. Counseled pt on diagnosis and care plan. Pt has expressed understanding, and all questions have been answered. Pt agrees with plan and agrees to F/U as recommended, or return to the ED if their sxs worsen. Discharge instructions have been provided and explained to the pt, along with reasons to return to the ED. Written by Juju Sweeney ED Scribe, as dictated by Alyssa Dias DO    This note is prepared by Juju Sweeney, acting as Scribe for Alyssa Dias, [address] [place] [address]: The scribe's documentation has been prepared under my direction and personally reviewed by me in its entirety.  I confirm that the note above accurately reflects all work, treatment, procedures, and medical decision making performed by me.

## 2017-01-17 NOTE — PATIENT INSTRUCTIONS

## 2017-01-18 ENCOUNTER — DOCUMENTATION ONLY (OUTPATIENT)
Dept: NEUROLOGY | Age: 57
End: 2017-01-18

## 2017-01-18 LAB
BACTERIA SPEC CULT: NORMAL
CC UR VC: NORMAL
FOLATE SERPL-MCNC: 9.8 NG/ML
SERVICE CMNT-IMP: NORMAL
VIT B12 SERPL-MCNC: 328 PG/ML (ref 211–946)

## 2017-01-25 ENCOUNTER — APPOINTMENT (OUTPATIENT)
Dept: MRI IMAGING | Age: 57
End: 2017-01-25
Attending: NURSE PRACTITIONER
Payer: COMMERCIAL

## 2017-01-25 ENCOUNTER — HOSPITAL ENCOUNTER (OUTPATIENT)
Dept: NEUROLOGY | Age: 57
Discharge: HOME | End: 2017-01-25
Attending: NURSE PRACTITIONER
Payer: COMMERCIAL

## 2017-01-25 DIAGNOSIS — R55 SYNCOPE, UNSPECIFIED SYNCOPE TYPE: ICD-10-CM

## 2017-01-25 PROCEDURE — 95816 EEG AWAKE AND DROWSY: CPT

## 2017-01-26 NOTE — PROCEDURES
Patient Name: Lev Juárez  : 1960  Age: 64 y.o. Ordering physician: Moe Beatty V  Date of EE2017  EEG procedure number: [id number]  [facility]  Interpreting physician: Sara Henry MD      ELECTROENCEPHALOGRAM REPORT     PROCEDURE: EEG. CLINICAL INDICATION: The patient is a 64 y.o. female with a history of   possible seizures. EEG to rule out seizures, rule out stroke, rule out   cortical abnormality. EEG CLASSIFICATION: Essentially normal    DESCRIPTION OF THE RECORD:   The background of this recording contains a posteriorly-located occipital alpha rhythm of 11 Hz that attenuates with eye opening. Throughout the recording, there were no clear areas of focal slowing nor spike or spike-and-wave discharges seen. Hyperventilation was not performed. Photic stimulation produced a minimal driving response in the posterior head regions. During the recording the patient did not achieve stage II sleep    INTERPRETATION: This is a normal electroencephalogram showing no clear focal abnormalities or epileptiform activity. A normal EEG doesn't not rule out seizures. Clinical correlation recommended.         Houston Centeno MD  2017  9:00 PM

## 2017-01-27 ENCOUNTER — TELEPHONE (OUTPATIENT)
Dept: SURGERY | Age: 57
End: 2017-01-27

## 2018-01-29 ENCOUNTER — TELEPHONE (OUTPATIENT)
Dept: SURGERY | Age: 58
End: 2018-01-29

## 2019-01-28 ENCOUNTER — TELEPHONE (OUTPATIENT)
Dept: SURGERY | Age: 59
End: 2019-01-28

## 2019-06-07 ENCOUNTER — OFFICE VISIT (OUTPATIENT)
Dept: INTERNAL MEDICINE CLINIC | Facility: CLINIC | Age: 59
End: 2019-06-07

## 2019-06-07 VITALS
OXYGEN SATURATION: 98 % | BODY MASS INDEX: 34.32 KG/M2 | DIASTOLIC BLOOD PRESSURE: 99 MMHG | WEIGHT: 206 LBS | SYSTOLIC BLOOD PRESSURE: 145 MMHG | HEART RATE: 67 BPM | HEIGHT: 65 IN | RESPIRATION RATE: 8 BRPM | TEMPERATURE: 98.6 F

## 2019-06-07 DIAGNOSIS — K21.9 GASTROESOPHAGEAL REFLUX DISEASE WITHOUT ESOPHAGITIS: ICD-10-CM

## 2019-06-07 DIAGNOSIS — E55.9 VITAMIN D DEFICIENCY: ICD-10-CM

## 2019-06-07 DIAGNOSIS — Z13.220 SCREENING, LIPID: ICD-10-CM

## 2019-06-07 DIAGNOSIS — Z12.11 SCREENING FOR COLON CANCER: ICD-10-CM

## 2019-06-07 DIAGNOSIS — Z76.89 ENCOUNTER TO ESTABLISH CARE: Primary | ICD-10-CM

## 2019-06-07 DIAGNOSIS — Z23 NEED FOR SHINGLES VACCINE: ICD-10-CM

## 2019-06-07 DIAGNOSIS — Z12.39 SCREENING FOR BREAST CANCER: ICD-10-CM

## 2019-06-07 DIAGNOSIS — Z72.0 TOBACCO USE: ICD-10-CM

## 2019-06-07 DIAGNOSIS — Z11.59 NEED FOR HEPATITIS C SCREENING TEST: ICD-10-CM

## 2019-06-07 DIAGNOSIS — E66.9 OBESITY (BMI 30-39.9): ICD-10-CM

## 2019-06-07 DIAGNOSIS — Z98.84 HISTORY OF GASTRIC BYPASS: ICD-10-CM

## 2019-06-07 DIAGNOSIS — F41.0 PANIC DISORDER: ICD-10-CM

## 2019-06-07 DIAGNOSIS — F33.2 SEVERE EPISODE OF RECURRENT MAJOR DEPRESSIVE DISORDER, WITHOUT PSYCHOTIC FEATURES (HCC): ICD-10-CM

## 2019-06-07 DIAGNOSIS — R73.02 IGT (IMPAIRED GLUCOSE TOLERANCE): ICD-10-CM

## 2019-06-07 DIAGNOSIS — E03.9 ACQUIRED HYPOTHYROIDISM: ICD-10-CM

## 2019-06-07 DIAGNOSIS — F51.04 CHRONIC INSOMNIA: ICD-10-CM

## 2019-06-07 DIAGNOSIS — I10 ESSENTIAL HYPERTENSION: ICD-10-CM

## 2019-06-07 RX ORDER — HYDROCHLOROTHIAZIDE 12.5 MG/1
12.5 TABLET ORAL DAILY
Qty: 30 TAB | Refills: 1 | Status: SHIPPED | OUTPATIENT
Start: 2019-06-07 | End: 2019-07-11 | Stop reason: ALTCHOICE

## 2019-06-07 RX ORDER — QUETIAPINE FUMARATE 50 MG/1
100 TABLET, FILM COATED ORAL
Qty: 60 TAB | Refills: 5
Start: 2019-06-07 | End: 2019-08-08 | Stop reason: SDUPTHER

## 2019-06-07 RX ORDER — LORAZEPAM 0.5 MG/1
TABLET ORAL
Qty: 30 TAB | Refills: 0 | Status: SHIPPED | OUTPATIENT
Start: 2019-06-07

## 2019-06-07 RX ORDER — RANITIDINE 300 MG/1
300 TABLET ORAL
Qty: 30 TAB | Refills: 1 | Status: SHIPPED | OUTPATIENT
Start: 2019-06-07 | End: 2019-07-11 | Stop reason: DRUGHIGH

## 2019-06-07 RX ORDER — BUPROPION HYDROCHLORIDE 300 MG/1
300 TABLET ORAL
COMMUNITY
End: 2019-09-06 | Stop reason: SDUPTHER

## 2019-06-07 NOTE — PATIENT INSTRUCTIONS

## 2019-06-07 NOTE — PROGRESS NOTES
CC:  Chief Complaint   Patient presents with    Depression    Establish Care       HISTORY OF PRESENT ILLNESS  Ralph Rodríguez is a 61 y.o. female. Presents to establish care. Her previous PCP was Dr. Jennifer Fenrandez. Used to see Dr. Lonnie Cardona several years ago. She has HTN, hypothyroidism, major depression, anxiety disorder with panic attacks, GERD, history of gastric bypass in  with loss of 145 lbs, history of ischemic colitis requiring hospitalization in , and family history of colon polyps and kidney disease requiring hemodialysis. Today she complains of depressed mood since her son  in  in a motorcycle accident. Used to follow with a psychiatrist at [facility] but was unable to find a new psychiatrist after he retired. She has an appointment to see a therapist on [address], [address]). Recently increased crying, poor sleep, increased appetite, occasional nightmares, loss of interest in doing things, problems with memory and concentration. Has panic attacks about once every 1-2 weeks. Thyroid Review  Patient is seen for hypothyroidism. Thyroid ROS: has fatigue and weight gain, denies cold intolerance, bowel/skin changes or CVS symptoms. Taking medication as prescribed. Soc Hx  . Has 1 living son, 2 step-daughters and 9 grandchildren. Works from home as a  for [employer]. Smokes 1 ppd x 30 yrs, quit previously for 2 yrs before gastric bypass. Drinks occasional mixed drinks. Denies recreational drug use. Drinks 2 cups of coffee daily and drinks several cups of tea daily.       Health Maintenance  Flu vaccine: 2019    Pneumonia vaccine: unknown, due for this    Tetanus vaccine: due for this    Shingles vaccine: due for this, will check at her pharmacy   Pap smear: 2 yrs ago, [facility], [address]: 2 years ago, [facility], [place]  Colonoscopy:  2012, gastroenterologist now retired, due for this  Eye exam: 2018, cataracts, [facility]  Lipids: will check today  A1c: will check today  Advanced Directives: given information  End of Life: given information      ROS  Positive for heartburn, constipation, occasional heart palpitations, frequent headaches, hemorrhoids, joint pain at knees  A complete review of systems was performed and is negative except for those mentioned above and in the HPI.       Patient Active Problem List   Diagnosis Code    Thyroid disease E07.9    Hypertension I10    Gastrointestinal disorder K92.9    Hiatal hernia K44.9    Postmenopausal Z78.0    LVH (left ventricular hypertrophy) due to hypertensive disease I11.9    Venous stasis I87.8    Depression with anxiety F41.8    Morbid obesity (HCC) E66.01    Fatigue R53.83    Heart disease I51.9    Joint pain M25.50    Leg swelling M79.89    Vertigo R42    Short of breath on exertion R06.02    Muscle pain M79.10    Syncope R55     Past Medical History:   Diagnosis Date    Anxiety disorder     Arthritis     OSTEO     Chronic pain     right knee arthritis    Depression     Dyspepsia and other specified disorders of function of stomach     Fatigue     Gastrointestinal disorder     ischemic bowel    GERD (gastroesophageal reflux disease)     Headache     Hearing reduced     Heart disease     Hiatal hernia     Hypertension     Ischemic colon ([address] 2009    Joint pain     Kidney disease     Leg swelling     LVH (left ventricular hypertrophy) due to hypertensive disease     dr Elsy Kolb   [facility] Morbid obesity ([address]     Muscle pain     Postmenopausal     Psychiatric disorder     ANXIETY AND DEPRESSION    Short of breath on exertion     Stool color black     Stroke ([address]     TIA    Thyroid disease     Venous stasis     Vertigo     Vision decreased      Past Surgical History:   Procedure Laterality Date    COLONOSCOPY,DIAGNOSTIC  12/31/2012         ENDOSCOPY, COLON, DIAGNOSTIC      3/09- repeat q 3 for ischemic colitis    HX CHOLECYSTECTOMY      HX GYN  [id number]    2 c-sections    HX GYN  2009    ABLATION    HX LAP GASTRIC BYPASS      HX WISDOM TEETH EXTRACTION       Social History     Socioeconomic History    Marital status:      Spouse name: Not on file    Number of children: Not on file    Years of education: Not on file    Highest education level: Not on file   Tobacco Use    Smoking status: Current Every Day Smoker     Packs/day: 1.00    Smokeless tobacco: Never Used   Substance and Sexual Activity    Alcohol use: No    Drug use: No    Sexual activity: Yes     Family History   Problem Relation Age of Onset    Heart Disease Mother         PULM HTN    Stroke Mother     Hypertension Mother     Colon Polyps Mother     Kidney Disease Mother         DIALYSIS PT    Thyroid Disease Mother     Other Mother         BLOOD CLOT HX    Diabetes Father     Hypertension Father     Colon Polyps Father     Heart Disease Father     Psychiatric Disorder Father         ALZEHEIMERS    Diabetes Brother     Hypertension Brother     Kidney Disease Maternal Aunt         DIALYSIS    Kidney Disease Maternal Uncle     Kidney Disease Maternal Grandmother     Heart Disease Maternal Grandmother     Diabetes Paternal Grandfather     Kidney Disease Maternal Aunt         DIALYSIS    Kidney Disease Maternal Uncle         DIALYSIS    Other Son     Colon Polyps Maternal Grandfather      Allergies   Allergen Reactions    Lisinopril Cough    Lortab [Hydrocodone-Acetaminophen] Itching    Morphine Nausea and Vomiting     Current Outpatient Medications   Medication Sig Dispense Refill    buPROPion XL (WELLBUTRIN XL) 300 mg XL tablet Take 300 mg by mouth every morning.  alpha-D-galactosidase (BEANO PO) Take  by mouth.  simethicone (GAS-X PO) Take  by mouth.       QUEtiapine (SEROQUEL) 50 mg tablet Take 50 mg by mouth nightly.  DULoxetine (CYMBALTA) 30 mg capsule Take 120 mg by mouth daily.  pantoprazole (PROTONIX) 40 mg tablet TAKE ONE TABLET BY MOUTH EVERY DAY 30 tablet 2    LORazepam (ATIVAN) 0.5 mg tablet TAKE ONE TO TWO TABLETS BY MOUTH EVERY 12 HOURS AS NEEDED FOR ANXIETY 50 Tab 0    levothyroxine (SYNTHROID) 100 mcg tablet Take 1 Tab by mouth Daily (before breakfast). 30 Tab 1    TAZTIA  mg SR capsule TAKE ONE CAPSULE BY MOUTH EVERY DAY 30 Cap PRN         PHYSICAL EXAM  Visit Vitals  BP (!) 145/99   Pulse 67   Temp 98.6 °F (37 °C) (Oral)   Resp 8   Ht 5' 5\" (1.651 m)   Wt 206 lb (93.4 kg)   SpO2 98%   BMI 34.28 kg/m²       General: Obese. In no distress. Alert and oriented to person, place, and time. Head: Normocephalic, atraumatic. Eyes: Conjunctiva clear. Pupils equal, round, reactive to light. Extraocular movements intact. Ears/Nose: Normal nasal mucosa. Mouth/Throat: Oropharynx benign. Neck: Supple, no lymphadenopathy, no carotid bruits, no JVD, thyroid: not enlarged, symmetric, no tenderness/mass/nodules. Lungs: Clear to auscultation bilaterally. No crackles or wheezes. Chest Wall: No tenderness or deformity. Heart: RRR, normal S1 and S2, no murmur, click, rub, or gallop. Back: ROM normal. No CVA tenderness. Abdomen: Soft, non-distended, bowel sounds normal. No tenderness. No masses. No hepatosplenomegaly. Lymph nodes: Cervical and supraclavicular nodes normal.  Extremities: No clubbing, cyanosis. Trace bilateral LE edema. Skin: No rashes or lesions. Pulses: 2+ and symmetric at dorsalis pedis and posterior tibialis pulses. Neurological: CN II-XII intact. Normal strength, sensation, and reflexes throughout. Musculoskeletal: Gait normal. ROM normal with mild crepitus at both knees. Psychiatric: Very tearful and crying. Behavior is normal.          ASSESSMENT AND PLAN    ICD-10-CM ICD-9-CM    1. Encounter to establish care Z76.89 V65.8    2.  Essential hypertension O57 294.8 METABOLIC PANEL, COMPREHENSIVE      CBC WITH AUTOMATED DIFF      hydroCHLOROthiazide (HYDRODIURIL) 12.5 mg tablet   3. Severe episode of recurrent major depressive disorder, without psychotic features ([facility] F33.2 296.33    4. Panic disorder F41.0 300.01 LORazepam (ATIVAN) 0.5 mg tablet   5. Acquired hypothyroidism E03.9 244.9 TSH RFX ON ABNORMAL TO FREE T4   6. Vitamin D deficiency E55.9 268.9 VITAMIN D, 25 HYDROXY   7. Chronic insomnia F51.04 780.52    8. Gastroesophageal reflux disease without esophagitis K21.9 530.81 raNITIdine (ZANTAC) 300 mg tab   9. History of gastric bypass Z98.84 V45.86    10. Tobacco use Z72.0 305.1    11. IGT (impaired glucose tolerance) R73.02 790.22 HEMOGLOBIN A1C WITH EAG   12. Obesity (BMI 30-39. 9) E66.9 278.00    13. Screening for breast cancer Z12.31 V76.10 ELLIE MAMMO BI SCREENING INCL CAD   15. Screening for colon cancer Z12.11 V76.51 REFERRAL TO GASTROENTEROLOGY   15. Screening, lipid Z13.220 V77.91 LIPID PANEL   16. Need for hepatitis C screening test Z11.59 V73.89 HEPATITIS C AB   17. Need for shingles vaccine Z23 V04.89      Diagnoses and all orders for this visit:    1. Encounter to establish care  Medical records from former PCP and other providers requested. 2. Essential hypertension  Not at goal.  Elevated at 145/99 today. Continue diltiazem  mg daily. Add HCTZ 12.5 mg daily.  -     METABOLIC PANEL, COMPREHENSIVE  -     CBC WITH AUTOMATED DIFF  -     hydroCHLOROthiazide (HYDRODIURIL) 12.5 mg tablet; Take 1 Tab by mouth daily. Indications: high blood pressure    3. Severe episode of recurrent major depressive disorder, without psychotic features (HCC)  Continue Wellbutrin  mg daily and Cymbalta 120 mg daily. 4. Panic disorder  -     Refill LORazepam (ATIVAN) 0.5 mg tablet; TAKE ONE BY MOUTH DAILY AS NEEDED FOR ANXIETY (#30)    5.  Acquired hypothyroidism  Continue present dose of levothyroxine pending results.  -     TSH RFX ON ABNORMAL TO FREE T4    6. Vitamin D deficiency  Has taken ergocalciferol in the past.  -     VITAMIN D, 25 HYDROXY    7. Chronic insomnia  Increase Seroquel 50 mg to 2 tabs nightly (states her prescription was already written as     8. Gastroesophageal reflux disease without esophagitis  Stop pantoprazole due to potential side effects of kidney disease and memory problems.  -     Start raNITIdine (ZANTAC) 300 mg tab; Take 1 Tab by mouth nightly. Indications: gastroesophageal reflux disease    9. History of gastric bypass    10. Tobacco use  Counseled on smoking cessation. 11. IGT (impaired glucose tolerance)  -     HEMOGLOBIN A1C WITH EAG    12. Obesity (BMI 30-39. 9)  Discussed the patient's BMI with her. The BMI follow up plan is as follows: dietary management education, guidance, and counseling; encourage exercise; monitor weight; prescribed dietary intake. Follow up BMI in 3 months. 15. Screening for breast cancer  -     [facility] MAMMO BI SCREENING INCL CAD; Future    14. Screening for colon cancer  -     REFERRAL TO GASTROENTEROLOGY    15. Screening, lipid  -     LIPID PANEL    16. Need for hepatitis C screening test  -     HEPATITIS C AB    17. Need for shingles vaccine    Greater than 60 mins direct face-to-face time spent with patient. Greater than 50% of time spent on counseling and coordination of care. Follow-up and Dispositions    · Return in about 1 month (around 7/7/2019), or if symptoms worsen or fail to improve, for HTN, depression. I have discussed the diagnosis with the patient and the intended plan as seen in the above orders. Patient is in agreement. The patient has received an after-visit summary and questions were answered concerning future plans. I have discussed medication side effects and warnings with the patient as well.

## 2019-06-07 NOTE — PROGRESS NOTES
Cleo Bowie  Identified pt with two pt identifiers(name and ). Chief Complaint   Patient presents with    Depression    Establish Care       1. Have you been to the ER, urgent care clinic since your last visit? Hospitalized since your last visit? NO    2. Have you seen or consulted any other health care providers outside of the [address] [place] [address] since your last visit? Include any pap smears or colon screening. NO    Today's provider has been notified of reason for visit, vitals and flowsheets obtained on patients. Patient has advance directives and is asked to bring in copy to office.       Reviewed record In preparation for visit, huddled with provider and have obtained necessary documentation      Health Maintenance Due   Topic    Hepatitis C Screening     DTaP/Tdap/Td series (1 - Tdap)    Shingrix Vaccine Age 50> (1 of 2)    FOBT Q 1 YEAR AGE 50-75     BREAST CANCER SCRN MAMMOGRAM     PAP AKA CERVICAL CYTOLOGY        Wt Readings from Last 3 Encounters:   19 206 lb (93.4 kg)   17 188 lb 3.2 oz (85.4 kg)   17 180 lb (81.6 kg)     Temp Readings from Last 3 Encounters:   19 98.6 °F (37 °C) (Oral)   14 96.3 °F (35.7 °C) (Tympanic)   14 97.1 °F (36.2 °C) (Tympanic)     BP Readings from Last 3 Encounters:   19 158/90   17 140/80   17 (!) 160/98     Pulse Readings from Last 3 Encounters:   19 67   17 67   17 67     Vitals:    19 0848   BP: 158/90   Pulse: 67   Resp: 8   Temp: 98.6 °F (37 °C)   TempSrc: Oral   SpO2: 98%   Weight: 206 lb (93.4 kg)   Height: 5' 5\" (1.651 m)   PainSc:   0 - No pain         Learning Assessment:  :     Learning Assessment 2017   PRIMARY LEARNER Patient   PRIMARY LANGUAGE ENGLISH   LEARNER PREFERENCE PRIMARY DEMONSTRATION   ANSWERED BY patient   RELATIONSHIP SELF       Depression Screening:  :     3 most recent PHQ Screens 2019   Little interest or pleasure in doing things Nearly every day   Feeling down, depressed, irritable, or hopeless Nearly every day   Total Score PHQ 2 6   Trouble falling or staying asleep, or sleeping too much Nearly every day   Feeling tired or having little energy Nearly every day   Poor appetite, weight loss, or overeating Nearly every day   Feeling bad about yourself - or that you are a failure or have let yourself or your family down Not at all   Trouble concentrating on things such as school, work, reading, or watching TV Nearly every day   Moving or speaking so slowly that other people could have noticed; or the opposite being so fidgety that others notice Nearly every day   Thoughts of being better off dead, or hurting yourself in some way Not at all   PHQ 9 Score 21   How difficult have these problems made it for you to do your work, take care of your home and get along with others Extremely difficult       Fall Risk Assessment:  :     No flowsheet data found. Abuse Screening:  :     No flowsheet data found. ADL Screening:  :     ADL Assessment 6/7/2019   Feeding yourself No Help Needed   Getting from bed to chair No Help Needed   Getting dressed No Help Needed   Bathing or showering No Help Needed   Walk across the room (includes cane/walker) No Help Needed   Using the telphone No Help Needed   Taking your medications No Help Needed   Preparing meals No Help Needed   Managing money (expenses/bills) No Help Needed   Moderately strenuous housework (laundry) No Help Needed   Shopping for personal items (toiletries/medicines) No Help Needed   Shopping for groceries No Help Needed   Driving No Help Needed   Climbing a flight of stairs No Help Needed   Getting to places beyond walking distances No Help Needed                 Medication reconciliation up to date and corrected with patient at this time.

## 2019-06-07 NOTE — LETTER
Name:Ramandeep Bowie HDO:[birth year] MR #:[id number] Provider Natalia Alexander MD  
*QIUG-805* BSMG-491 (5/16) Page 1 of 5 Initial c3 creations CONTROLLED SUBSTANCE AGREEMENT I may be prescribed medications that are controlled substances as part  of my treatment plan for management of my medical condition(s). The goal of my treatment plan is to maintain and/or improve my health and wellbeing. Because controlled substances have an increased risk of abuse or harm, continual re-evaluation is needed determine if the goals of my treatment plan are being met for my safety and the safety of others. Chayito Gray  am entering into this Controlled Substance Agreement with my provider, Marilu Pelayo MD at [address] [place] [address] . I understand that successful treatment requires mutual trust and honesty between me and my provider. I understand that there are state and federal laws and regulations which apply to the medications that my provider may prescribe that must be followed. I understand there are risks and benefits ts of taking the medicines that my provider may prescribe. I understand and agree that following this Agreement is necessary in continuing my provider-patient relationship and success of my treatment plan. As a part of my treatment plan, I agree to the following: COMMUNICATION: 
 
1. I will communicate fully with my provider about my medical condition(s), including the effect on my daily life and how well my medications are helping. I will tell my provider all of the medications that I take for any reason, including medications I receive from another health care provider, and will notify my provider about all issues, problems or concerns, including any side effects, which may be related to my medications. I understand that this information allows my provider to adjust my treatment plan to help manage my medical condition.  I understand that this information will become part of my permanent medical record. 2. I will notify my provider if I have a history of alcohol/drug misuse/addiction or if I have had treatment for alcohol/drug addiction in the past, or if I have a new problem with or concern about alcohol/drug use/addiction, because this increases the likelihood of high risk behaviors and may lead to serious medical conditions. 3. Females Only: I will notify my provider if I am or become pregnant, or if I intend to become pregnant, or if I intend to breastfeed. I understand that communication of these issues with my provider is important, due to possible effects my medication could have on an unborn fetus or breastfeeding child. Name:. Roxanne Vickers SVK:[id number] MR #:[id number] Provider Kirsten Penaloza MD  
*MPHI-052* BSMG-491 (5/16) Page 2 of 5 Initial SMARTworks MISUSE OF MEDICATIONS / DRUGS: 
 
1. I agree to take all controlled substances as prescribed, and will not misuse or abuse any controlled substances prescribed by my provider. For my safety, I will not increase the amount of medicine I take without first talking with and getting permission from my provider. 2. If I have a medical emergency, another health care provider may prescribe me medication. If I seek emergency treatment, I will notify my provider within seventy-two (72) hours. 3. I understand that my provider may discuss my use and/or possible misuse/abuse of controlled substances and alcohol, as appropriate, with any health care provider involved in my care, pharmacist or legal authority. ILLEGAL DRUGS: 
 
1. I will not use illegal drugs of any kind, including but not limited to marijuana, heroin, cocaine, or any prescription drug which is not prescribed to me. DRUG DIVERSION / PRESCRIPTION FRAUD: 
 
1. I will not share, sell, trade, give away, or otherwise misuse my prescriptions or medications. 2. I will not alter any prescriptions provided to me by my provider. SINGLE PROVIDER: 
 
1. I agree that all controlled substances that I take will be prescribed only by my provider (or his/her covering provider) under this Agreement. This agreement does not prevent me from seeking emergency medical treatment or receiving pain management related to a surgery. PROTECTING MEDICATIONS: 
 
1. I am responsible for keeping my prescriptions and medications in a safe and secure place including safeguarding them from loss or theft. I understand that lost, stolen or damaged/destroyed prescriptions or medications will not be replaced. Name:. Robbi Novak ZSO:[id number][birth year]0874 MR #:[id number] Provider Isamar Post MD  
*ELEH-930* BSMG-491 (5/16) Page 3 of 5 Initial Drimmi PRESCRIPTION RENEWALS/REFILLS: 
 
1. I will follow my controlled substance medication schedule as prescribed by my provider. 2. I understand and agree that I will make any requests for renewals or refills of my prescriptions only at the time of an office visit or during my providers regular office hours subject to the prescription refill requirements of the individual practice. 3. I understand that my provider may not call in prescriptions for controlled substances to my pharmacy. 4. I understand that my provider may adjust or discontinue these medications as deemed appropriate for my medical treatment plan. This Agreement does not guarantee the prescription of controlled medications. 5. I agree that if my medications are adjusted or discontinued, I will properly dispose of any remaining medications. I understand that I will be required to dispose of any remaining controlled medications prior to being provided with any prescriptions for other controlled medications.  
 
 
1. I authorize my provider and my pharmacy to cooperate fully with any local, state, or federal law enforcement agency in the investigation of any possible misuse, sale, or other diversion of my controlled substance prescriptions or medications. RISKS: 
 
 
1. I understand that if I do not adhere to this Agreement in any way, my provider may change my prescriptions, stop prescribing controlled substances or end our provider-patient relationship. 2. If my provider decides to stop prescribing medication, or decides to end our provider-patient relationship,my provider may require that I taper my medications slowly. If necessary, my provider may also provide a prescription for other medications to treat my withdrawal symptoms. UNDERSTANDING THIS AGREEMENT: 
 
I understand that my provider may adjust or stop my prescriptions for controlled substances based on my medical condition and my treatment plan. I understand that this Agreement does not guarantee that I will be prescribed medications or controlled substances. I understand that controlled substances may be just one part 
of my treatment plan.  
 
My initial on each page and my signature below shows that I have read each page of this Agreement, I have had an opportunity to ask questions, and all of my questions have been answered to my satisfaction by my provider. By signing below, I agree to comply with this Agreement, and I understand that if I do not follow the Agreements listed above, my provider may stop 
 
 
 
_________________________________________  Date/Time 6/7/2019 9:05 AM   
             (Patient Signature)

## 2019-06-08 LAB
25(OH)D3+25(OH)D2 SERPL-MCNC: 17.2 NG/ML (ref 30–100)
ALBUMIN SERPL-MCNC: 4.3 G/DL (ref 3.5–5.5)
ALBUMIN/GLOB SERPL: 1.9 {RATIO} (ref 1.2–2.2)
ALP SERPL-CCNC: 143 IU/L (ref 39–117)
ALT SERPL-CCNC: 44 IU/L (ref 0–32)
AST SERPL-CCNC: 24 IU/L (ref 0–40)
BASOPHILS # BLD AUTO: 0 X10E3/UL (ref 0–0.2)
BASOPHILS NFR BLD AUTO: 0 %
BILIRUB SERPL-MCNC: 0.3 MG/DL (ref 0–1.2)
BUN SERPL-MCNC: 17 MG/DL (ref 6–24)
BUN/CREAT SERPL: 14 (ref 9–23)
CALCIUM SERPL-MCNC: 9.3 MG/DL (ref 8.7–10.2)
CHLORIDE SERPL-SCNC: 105 MMOL/L (ref 96–106)
CHOLEST SERPL-MCNC: 206 MG/DL (ref 100–199)
CO2 SERPL-SCNC: 23 MMOL/L (ref 20–29)
CREAT SERPL-MCNC: 1.19 MG/DL (ref 0.57–1)
EOSINOPHIL # BLD AUTO: 0.1 X10E3/UL (ref 0–0.4)
EOSINOPHIL NFR BLD AUTO: 2 %
ERYTHROCYTE [DISTWIDTH] IN BLOOD BY AUTOMATED COUNT: 14.2 % (ref 12.3–15.4)
EST. AVERAGE GLUCOSE BLD GHB EST-MCNC: 123 MG/DL
GLOBULIN SER CALC-MCNC: 2.3 G/DL (ref 1.5–4.5)
GLUCOSE SERPL-MCNC: 100 MG/DL (ref 65–99)
HBA1C MFR BLD: 5.9 % (ref 4.8–5.6)
HCT VFR BLD AUTO: 40.3 % (ref 34–46.6)
HCV AB S/CO SERPL IA: <0.1 S/CO RATIO (ref 0–0.9)
HDLC SERPL-MCNC: 54 MG/DL
HGB BLD-MCNC: 13.2 G/DL (ref 11.1–15.9)
IMM GRANULOCYTES # BLD AUTO: 0 X10E3/UL (ref 0–0.1)
IMM GRANULOCYTES NFR BLD AUTO: 0 %
LDLC SERPL CALC-MCNC: 125 MG/DL (ref 0–99)
LYMPHOCYTES # BLD AUTO: 1.4 X10E3/UL (ref 0.7–3.1)
LYMPHOCYTES NFR BLD AUTO: 28 %
MCH RBC QN AUTO: 28.7 PG (ref 26.6–33)
MCHC RBC AUTO-ENTMCNC: 32.8 G/DL (ref 31.5–35.7)
MCV RBC AUTO: 88 FL (ref 79–97)
MONOCYTES # BLD AUTO: 0.4 X10E3/UL (ref 0.1–0.9)
MONOCYTES NFR BLD AUTO: 7 %
NEUTROPHILS # BLD AUTO: 3.1 X10E3/UL (ref 1.4–7)
NEUTROPHILS NFR BLD AUTO: 63 %
PLATELET # BLD AUTO: 257 X10E3/UL (ref 150–450)
POTASSIUM SERPL-SCNC: 4 MMOL/L (ref 3.5–5.2)
PROT SERPL-MCNC: 6.6 G/DL (ref 6–8.5)
RBC # BLD AUTO: 4.6 X10E6/UL (ref 3.77–5.28)
SODIUM SERPL-SCNC: 142 MMOL/L (ref 134–144)
TRIGL SERPL-MCNC: 137 MG/DL (ref 0–149)
TSH SERPL DL<=0.005 MIU/L-ACNC: 3.77 UIU/ML (ref 0.45–4.5)
VLDLC SERPL CALC-MCNC: 27 MG/DL (ref 5–40)
WBC # BLD AUTO: 4.9 X10E3/UL (ref 3.4–10.8)

## 2019-06-09 PROBLEM — E78.2 MIXED HYPERLIPIDEMIA: Status: ACTIVE | Noted: 2019-06-09

## 2019-06-09 PROBLEM — R73.03 PREDIABETES: Status: ACTIVE | Noted: 2019-06-09

## 2019-06-09 PROBLEM — N18.30 STAGE 3 CHRONIC KIDNEY DISEASE (HCC): Status: ACTIVE | Noted: 2019-06-09

## 2019-06-09 NOTE — PROGRESS NOTES
Your labs showed normal blood counts, thyroid, and hepatitis C screening test.  You have chronic kidney disease stage 3, and two of your liver tests were a little high. Your diabetes screening test showed mild prediabetes. Your total cholesterol was a little high at 206 (normal is less than 200) and your LDL, or bad cholesterol, was high at 125 (should be less than 100). Lastly, your vitamin D was low. Dr. Darryle Colt would like to start you on low-dose cholesterol-lowering medication and weekly vitamin D tablets. In addition, work on diet, exercise, and weight loss. [If patient is agreeable, send me rx for atorvastatin 10 mg, 1 tab by mouth nightly, #30, 5 RF and ergocalciferol 50,000 units 1 cap by mouth every 7 days, #4, 5 RF].

## 2019-06-10 RX ORDER — ERGOCALCIFEROL 1.25 MG/1
50000 CAPSULE ORAL
Qty: 4 CAP | Refills: 5 | Status: SHIPPED | OUTPATIENT
Start: 2019-06-10 | End: 2019-10-03 | Stop reason: ALTCHOICE

## 2019-06-10 RX ORDER — ATORVASTATIN CALCIUM 10 MG/1
10 TABLET, FILM COATED ORAL DAILY
Qty: 30 TAB | Refills: 5 | Status: SHIPPED | OUTPATIENT
Start: 2019-06-10 | End: 2020-01-20

## 2019-06-10 NOTE — PROGRESS NOTES
Left message for patient to call the office for test results. Also advised My Chart message has been sent.

## 2019-06-10 NOTE — TELEPHONE ENCOUNTER
Spoke with patient and after verifying name and date of birth of patient gave patient test results and any instructions in note per provider. Patient stated understanding and agreed to start atorvastatin 10 mg, 1 tab by mouth nightly, #30, 5 RF and ergocalciferol 50,000 units 1 cap by mouth every 7 days, #4, 5 RF. Prescriptions pended per Dr Jerman Hamilton lab result note.

## 2019-07-11 ENCOUNTER — OFFICE VISIT (OUTPATIENT)
Dept: INTERNAL MEDICINE CLINIC | Facility: CLINIC | Age: 59
End: 2019-07-11

## 2019-07-11 ENCOUNTER — TELEPHONE (OUTPATIENT)
Dept: INTERNAL MEDICINE CLINIC | Facility: CLINIC | Age: 59
End: 2019-07-11

## 2019-07-11 VITALS
BODY MASS INDEX: 33.66 KG/M2 | OXYGEN SATURATION: 96 % | RESPIRATION RATE: 16 BRPM | WEIGHT: 202 LBS | SYSTOLIC BLOOD PRESSURE: 144 MMHG | TEMPERATURE: 98.3 F | HEART RATE: 72 BPM | HEIGHT: 65 IN | DIASTOLIC BLOOD PRESSURE: 94 MMHG

## 2019-07-11 DIAGNOSIS — F33.2 SEVERE EPISODE OF RECURRENT MAJOR DEPRESSIVE DISORDER, WITHOUT PSYCHOTIC FEATURES (HCC): ICD-10-CM

## 2019-07-11 DIAGNOSIS — N18.30 STAGE 3 CHRONIC KIDNEY DISEASE (HCC): ICD-10-CM

## 2019-07-11 DIAGNOSIS — I10 ESSENTIAL HYPERTENSION: Primary | ICD-10-CM

## 2019-07-11 DIAGNOSIS — K21.9 GASTROESOPHAGEAL REFLUX DISEASE WITHOUT ESOPHAGITIS: ICD-10-CM

## 2019-07-11 DIAGNOSIS — F41.0 PANIC DISORDER: ICD-10-CM

## 2019-07-11 RX ORDER — HYDROCHLOROTHIAZIDE 12.5 MG/1
12.5 TABLET ORAL DAILY
Qty: 90 TAB | Refills: 1 | Status: SHIPPED | OUTPATIENT
Start: 2019-07-11 | End: 2020-01-31 | Stop reason: DRUGHIGH

## 2019-07-11 RX ORDER — LOSARTAN POTASSIUM AND HYDROCHLOROTHIAZIDE 12.5; 5 MG/1; MG/1
1 TABLET ORAL DAILY
Qty: 30 TAB | Refills: 1 | Status: SHIPPED | OUTPATIENT
Start: 2019-07-11 | End: 2019-07-11

## 2019-07-11 RX ORDER — RANITIDINE 150 MG/1
150 TABLET, FILM COATED ORAL 2 TIMES DAILY
Qty: 60 TAB | Refills: 3 | Status: SHIPPED | OUTPATIENT
Start: 2019-07-11 | End: 2019-08-21

## 2019-07-11 RX ORDER — LOSARTAN POTASSIUM 50 MG/1
50 TABLET ORAL DAILY
Qty: 90 TAB | Refills: 1 | Status: SHIPPED | OUTPATIENT
Start: 2019-07-11 | End: 2020-01-20 | Stop reason: SDUPTHER

## 2019-07-11 NOTE — PROGRESS NOTES
Cleo Bowie  Identified pt with two pt identifiers(name and ). Chief Complaint   Patient presents with    Hypertension    Depression   GI referral reprinted for patient. 1. Have you been to the ER, urgent care clinic since your last visit? Hospitalized since your last visit? NO    2. Have you seen or consulted any other health care providers outside of the [address] [place] [address] since your last visit? Include any pap smears or colon screening. Sees therapist Lisa Altamirano    Today's provider has been notified of reason for visit, vitals and flowsheets obtained on patients.      Patient received paperwork for advance directive during previous visit but has not completed at this time     Reviewed record In preparation for visit, huddled with provider and have obtained necessary documentation      Health Maintenance Due   Topic    Pneumococcal 0-64 years (1 of 1 - PPSV23)    DTaP/Tdap/Td series (1 - Tdap)    BREAST CANCER SCRN MAMMOGRAM        Wt Readings from Last 3 Encounters:   19 202 lb (91.6 kg)   19 206 lb (93.4 kg)   17 188 lb 3.2 oz (85.4 kg)     Temp Readings from Last 3 Encounters:   19 98.3 °F (36.8 °C) (Oral)   19 98.6 °F (37 °C) (Oral)   14 96.3 °F (35.7 °C) (Tympanic)     BP Readings from Last 3 Encounters:   19 (!) 161/92   19 (!) 145/99   17 140/80     Pulse Readings from Last 3 Encounters:   19 72   19 67   17 67     Vitals:    19 1113   BP: (!) 161/92   Pulse: 72   Resp: 16   Temp: 98.3 °F (36.8 °C)   TempSrc: Oral   SpO2: 96%   Weight: 202 lb (91.6 kg)   Height: 5' 5\" (1.651 m)   PainSc:   0 - No pain         Learning Assessment:  :     Learning Assessment 2017   PRIMARY LEARNER Patient   PRIMARY LANGUAGE ENGLISH   LEARNER PREFERENCE PRIMARY DEMONSTRATION   ANSWERED BY patient   RELATIONSHIP SELF       Depression Screening:  :     3 most recent PHQ Screens 2019   Little interest or pleasure in doing things Nearly every day   Feeling down, depressed, irritable, or hopeless Nearly every day   Total Score PHQ 2 6   Trouble falling or staying asleep, or sleeping too much Nearly every day   Feeling tired or having little energy Nearly every day   Poor appetite, weight loss, or overeating Nearly every day   Feeling bad about yourself - or that you are a failure or have let yourself or your family down Not at all   Trouble concentrating on things such as school, work, reading, or watching TV Nearly every day   Moving or speaking so slowly that other people could have noticed; or the opposite being so fidgety that others notice Nearly every day   Thoughts of being better off dead, or hurting yourself in some way Not at all   PHQ 9 Score 21   How difficult have these problems made it for you to do your work, take care of your home and get along with others Extremely difficult       Fall Risk Assessment:  :     No flowsheet data found. Abuse Screening:  :     No flowsheet data found. ADL Screening:  :     ADL Assessment 6/7/2019   Feeding yourself No Help Needed   Getting from bed to chair No Help Needed   Getting dressed No Help Needed   Bathing or showering No Help Needed   Walk across the room (includes cane/walker) No Help Needed   Using the telphone No Help Needed   Taking your medications No Help Needed   Preparing meals No Help Needed   Managing money (expenses/bills) No Help Needed   Moderately strenuous housework (laundry) No Help Needed   Shopping for personal items (toiletries/medicines) No Help Needed   Shopping for groceries No Help Needed   Driving No Help Needed   Climbing a flight of stairs No Help Needed   Getting to places beyond walking distances No Help Needed                 Medication reconciliation up to date and corrected with patient at this time.

## 2019-07-11 NOTE — PATIENT INSTRUCTIONS
Patient Instructions  1. Stop hydrochlorothiazide and start losartan-hydrochlorothiazide 50-12.5 mg once a day for blood pressure. 2.  Continue diltiazem for blood pressure. 3.  Stay on a low-salt diet. 4.  Call Dr. Segundo Rodríguez office for an appointment.

## 2019-07-11 NOTE — PROGRESS NOTES
CC:   Chief Complaint   Patient presents with    Hypertension    Depression       HISTORY OF PRESENT ILLNESS  Octavio Gonzalez is a 61 y.o. female. Presents for 1 month follow up evaluation on HTN and depression. She has HTN, hypothyroidism, hyperlipidemia, major depression, anxiety disorder with panic attacks, GERD, history of gastric bypass in  with loss of 145 lbs, history of ischemic colitis requiring hospitalization in , and family history of colon polyps and kidney disease requiring hemodialysis. At last clinic visit, BP was 145/99. HCTZ 12.5 mg daily was added to diltiazem SR  360 mg daily that she was already taking. She reports compliance with both medications; tolerating with no side effects. Reports that her depressed is unimproved. Started after her son  in 2014 in a motorcycle accident. Does not know why but since 2018 her depression has been as bad as during the first year after his death. Continues to have increased crying, poor sleep, increased appetite, occasional nightmares, loss of interest in doing things, problems with memory and concentration, and panic attacks 1-2 times weekly. Denies suicidal or homicidal ideation. Has seen a new therapist twice Juan Soto, [address]). Used to follow with a psychiatrist at [facility] but was unable to find a new psychiatrist after he retired.   Her therapist said she would try to help her find a psychiatrist.       Patient Active Problem List   Diagnosis Code    Hypertension I10    LVH (left ventricular hypertrophy) due to hypertensive disease I11.9    Venous stasis I87.8    Severe episode of recurrent major depressive disorder, without psychotic features ([facility] F33.2    Panic disorder F41.0    Acquired hypothyroidism E03.9    Vitamin D deficiency E55.9    Chronic insomnia F51.04    Gastroesophageal reflux disease without esophagitis K21.9    History of gastric bypass Z98.84    Tobacco use Z72.0    Obesity (BMI 30-39. 9) E66.9    IGT (impaired glucose tolerance) R73.02    Stage 3 chronic kidney disease (HCC) N18.3    Prediabetes R73.03    Mixed hyperlipidemia E78.2     Past Medical History:   Diagnosis Date    Anxiety disorder     Arthritis     knee arthritis    Depression     Fatigue     GERD (gastroesophageal reflux disease)     Headache     Heart disease     Hiatal hernia     Hypertension     Ischemic colon ([facility] 2009    Kidney disease     Leg swelling     LVH (left ventricular hypertrophy) due to hypertensive disease     dr Sabina Sanchez Morbid obesity ([facility]     Postmenopausal     Short of breath on exertion     Thyroid disease     TIA (transient ischemic attack)     Venous stasis     Vertigo      Allergies   Allergen Reactions    Lisinopril Cough    Lortab [Hydrocodone-Acetaminophen] Itching    Morphine Nausea and Vomiting       Current Outpatient Medications   Medication Sig Dispense Refill    dilTIAZem (TAZTIA XT) 360 mg SR capsule TAKE ONE CAPSULE BY MOUTH EVERY DAY 90 Cap 3    atorvastatin (LIPITOR) 10 mg tablet Take 1 Tab by mouth daily. 30 Tab 5    ergocalciferol (ERGOCALCIFEROL) 50,000 unit capsule Take 1 Cap by mouth every seven (7) days. 4 Cap 5    buPROPion XL (WELLBUTRIN XL) 300 mg XL tablet Take 300 mg by mouth every morning.  alpha-D-galactosidase (BEANO PO) Take  by mouth.  simethicone (GAS-X PO) Take  by mouth.  LORazepam (ATIVAN) 0.5 mg tablet TAKE ONE BY MOUTH DAILY AS NEEDED FOR ANXIETY 30 Tab 0    hydroCHLOROthiazide (HYDRODIURIL) 12.5 mg tablet Take 1 Tab by mouth daily. Indications: high blood pressure 30 Tab 1    raNITIdine (ZANTAC) 300 mg tab Take 1 Tab by mouth nightly. Indications: gastroesophageal reflux disease 30 Tab 1    QUEtiapine (SEROQUEL) 50 mg tablet Take 2 Tabs by mouth nightly. 60 Tab 5    DULoxetine (CYMBALTA) 30 mg capsule Take 120 mg by mouth daily.       levothyroxine (SYNTHROID) 100 mcg tablet Take 1 Tab by mouth Daily (before breakfast). 30 Tab 1         PHYSICAL EXAM  Visit Vitals  BP (!) 144/94   Pulse 72   Temp 98.3 °F (36.8 °C) (Oral)   Resp 16   Ht 5' 5\" (1.651 m)   Wt 202 lb (91.6 kg)   SpO2 96%   BMI 33.61 kg/m²       General: Obese, no distress. HEENT:  Head normocephalic/atraumatic, no scleral icterus  Lungs:  Clear to ausculation bilaterally. Good air movement. Heart:  Regular rate and rhythm, normal S1 and S2, no murmur, gallop, or rub  Neurological: Alert and oriented. Psychiatric: Tearful. Behavior is normal.         ASSESSMENT AND PLAN    ICD-10-CM ICD-9-CM    1. Essential hypertension I10 401.9 DISCONTINUED: losartan-hydroCHLOROthiazide (HYZAAR) 50-12.5 mg per tablet   2. Stage 3 chronic kidney disease (HCC) N18.3 585.3    3. Severe episode of recurrent major depressive disorder, without psychotic features ([facility] F33.2 296.33 REFERRAL TO PSYCHIATRY   4. Panic disorder F41.0 300.01    5. Gastroesophageal reflux disease without esophagitis K21.9 530.81 raNITIdine (ZANTAC) 150 mg tablet     Diagnoses and all orders for this visit:    1. Essential hypertension  Not at goal of less than 140/90. Note: Her pharmacy later called to state that losartan-HCTZ is on back order so I approved losartan 50 mg and HCTZ 12.5 mg daily. Patient Instructions  1. Stop hydrochlorothiazide and start losartan-hydrochlorothiazide 50-12.5 mg once a day for blood pressure. 2.  Continue diltiazem for blood pressure. 3.  Stay on a low-salt diet. 4.  Call Dr. Yoly Olson office for an appointment. 2. Stage 3 chronic kidney disease ([facility]    3. Severe episode of recurrent major depressive disorder, without psychotic features ([facility]  -     REFERRAL TO PSYCHIATRY (Dr. Coy Medina)    4. Panic disorder    5. Gastroesophageal reflux disease without esophagitis  -     raNITIdine (ZANTAC) 150 mg tablet; Take 1 Tab by mouth two (2) times a day.       Follow-up and Dispositions    · Return in about 3 weeks (around 8/1/2019), or if symptoms worsen or fail to improve, for HTN. I have discussed the diagnosis with the patient and the intended plan as seen in the above orders. Patient is in agreement. The patient has received an after-visit summary and questions were answered concerning future plans. I have discussed medication side effects and warnings with the patient as well.

## 2019-07-11 NOTE — TELEPHONE ENCOUNTER
[facility] pharmacist - Losartan 50-12.5 mg is on back order and pharmacist wants to know if they could separate the medication

## 2019-08-08 DIAGNOSIS — F51.04 CHRONIC INSOMNIA: ICD-10-CM

## 2019-08-08 RX ORDER — QUETIAPINE FUMARATE 50 MG/1
100 TABLET, FILM COATED ORAL
Qty: 60 TAB | Refills: 5 | Status: SHIPPED | OUTPATIENT
Start: 2019-08-08

## 2019-08-08 RX ORDER — DULOXETIN HYDROCHLORIDE 30 MG/1
120 CAPSULE, DELAYED RELEASE ORAL DAILY
Qty: 120 CAP | Refills: 5 | Status: SHIPPED | OUTPATIENT
Start: 2019-08-08 | End: 2020-04-22

## 2019-09-06 NOTE — TELEPHONE ENCOUNTER
Pt is going out of town and wanted to know if this could be filled today. Refill     Requested Prescriptions     Pending Prescriptions Disp Refills    buPROPion XL (WELLBUTRIN XL) 300 mg XL tablet       Sig: Take 1 Tab by mouth every morning.

## 2019-09-18 RX ORDER — BUPROPION HYDROCHLORIDE 300 MG/1
300 TABLET ORAL
Qty: 90 TAB | Refills: 3 | Status: SHIPPED | OUTPATIENT
Start: 2019-09-18

## 2019-10-03 ENCOUNTER — OFFICE VISIT (OUTPATIENT)
Dept: INTERNAL MEDICINE CLINIC | Facility: CLINIC | Age: 59
End: 2019-10-03

## 2019-10-03 VITALS
WEIGHT: 206 LBS | HEART RATE: 67 BPM | OXYGEN SATURATION: 98 % | RESPIRATION RATE: 16 BRPM | SYSTOLIC BLOOD PRESSURE: 139 MMHG | HEIGHT: 65 IN | DIASTOLIC BLOOD PRESSURE: 85 MMHG | BODY MASS INDEX: 34.32 KG/M2 | TEMPERATURE: 98.7 F

## 2019-10-03 DIAGNOSIS — E03.9 ACQUIRED HYPOTHYROIDISM: ICD-10-CM

## 2019-10-03 DIAGNOSIS — M25.561 CHRONIC PAIN OF BOTH KNEES: ICD-10-CM

## 2019-10-03 DIAGNOSIS — E66.9 OBESITY (BMI 30-39.9): ICD-10-CM

## 2019-10-03 DIAGNOSIS — M25.562 CHRONIC PAIN OF BOTH KNEES: ICD-10-CM

## 2019-10-03 DIAGNOSIS — I10 ESSENTIAL HYPERTENSION: Primary | ICD-10-CM

## 2019-10-03 DIAGNOSIS — Z23 ENCOUNTER FOR IMMUNIZATION: ICD-10-CM

## 2019-10-03 DIAGNOSIS — F33.1 MODERATE EPISODE OF RECURRENT MAJOR DEPRESSIVE DISORDER (HCC): ICD-10-CM

## 2019-10-03 DIAGNOSIS — E78.2 MIXED HYPERLIPIDEMIA: ICD-10-CM

## 2019-10-03 DIAGNOSIS — Z23 NEED FOR SHINGLES VACCINE: ICD-10-CM

## 2019-10-03 DIAGNOSIS — N18.30 STAGE 3 CHRONIC KIDNEY DISEASE (HCC): ICD-10-CM

## 2019-10-03 DIAGNOSIS — M54.2 NECK PAIN: ICD-10-CM

## 2019-10-03 DIAGNOSIS — G89.29 CHRONIC PAIN OF BOTH KNEES: ICD-10-CM

## 2019-10-03 DIAGNOSIS — E55.9 VITAMIN D DEFICIENCY: ICD-10-CM

## 2019-10-03 RX ORDER — METHYLPREDNISOLONE 4 MG/1
TABLET ORAL
Qty: 1 DOSE PACK | Refills: 0 | Status: SHIPPED | OUTPATIENT
Start: 2019-10-03 | End: 2020-01-20

## 2019-10-03 RX ORDER — CHOLECALCIFEROL (VITAMIN D3) 125 MCG
5000 CAPSULE ORAL DAILY
Qty: 30 CAP | Refills: 5 | Status: SHIPPED | OUTPATIENT
Start: 2019-10-03 | End: 2021-12-02 | Stop reason: ALTCHOICE

## 2019-10-03 NOTE — PATIENT INSTRUCTIONS
Neck Pain: Care Instructions Your Care Instructions You can have neck pain anywhere from the bottom of your head to the top of your shoulders. It can spread to the upper back or arms. Injuries, painting a ceiling, sleeping with your neck twisted, staying in one position for too long, and many other activities can cause neck pain. Most neck pain gets better with home care. Your doctor may recommend medicine to relieve pain or relax your muscles. He or she may suggest exercise and physical therapy to increase flexibility and relieve stress. You may need to wear a special (cervical) collar to support your neck for a day or two. Follow-up care is a key part of your treatment and safety. Be sure to make and go to all appointments, and call your doctor if you are having problems. It's also a good idea to know your test results and keep a list of the medicines you take. How can you care for yourself at home? · Try using a heating pad on a low or medium setting for 15 to 20 minutes every 2 or 3 hours. Try a warm shower in place of one session with the heating pad. · You can also try an ice pack for 10 to 15 minutes every 2 to 3 hours. Put a thin cloth between the ice and your skin. · Take pain medicines exactly as directed. ? If the doctor gave you a prescription medicine for pain, take it as prescribed. ? If you are not taking a prescription pain medicine, ask your doctor if you can take an over-the-counter medicine. · If your doctor recommends a cervical collar, wear it exactly as directed. When should you call for help? Call your doctor now or seek immediate medical care if: 
  · You have new or worsening numbness in your arms, buttocks or legs.  
  · You have new or worsening weakness in your arms or legs. (This could make it hard to stand up.)  
  · You lose control of your bladder or bowels.  
 Watch closely for changes in your health, and be sure to contact your doctor if:   · Your neck pain is getting worse.  
  · You are not getting better after 1 week.  
  · You do not get better as expected. Where can you learn more? Go to http://alphonse-xochitl.info/. Enter [id number] in the search box to learn more about \"Neck Pain: Care Instructions. \" Current as of: June 26, 2019 Content Version: 12.2 © 3128-9449 [employer]. Care instructions adapted under license by [employer] (which disclaims liability or warranty for this information). If you have questions about a medical condition or this instruction, always ask your healthcare professional. [place] [address] any warranty or liability for your use of this information. Recombinant Zoster (Shingles) Vaccine, RZV: What you need to know Why get vaccinated? Shingles (also called herpes zoster, or just zoster) is a painful skin rash, often with blisters. Shingles is caused by the varicella zoster virus, the same virus that causes chickenpox. After you have chickenpox, the virus stays in your body and can cause shingles later in life. You can't catch shingles from another person. However, a person who has never had chickenpox (or chickenpox vaccine) could get chickenpox from someone with shingles. A shingles rash usually appears on one side of the face or body and heals within 2 to 4 weeks. Its main symptom is pain, which can be severe. Other symptoms can include fever, headache, chills, and upset stomach. Very rarely, a shingles infection can lead to pneumonia, hearing problems, blindness, brain inflammation (encephalitis), or death. For about 1 person in 5, severe pain can continue even long after the rash has cleared up. This long-lasting pain is called post-herpetic neuralgia (PHN). Shingles is far more common in people 48years of age and older than in younger people, and the risk increases with age.  It is also more common in people whose immune system is weakened because of a disease such as cancer, or by drugs such as steroids or chemotherapy. At least 1 million people a year in the [facility] get shingles. Shingles vaccine (recombinant) Recombinant shingles vaccine was approved by FDA in 2017 for the prevention of shingles. In clinical trials, it was more than 90% effective in preventing shingles. It can also reduce the likelihood of PHN. Two doses, 2 to 6 months apart, are recommended for adults 48 and older. This vaccine is also recommended for people who have already gotten the live shingles vaccine (Zostavax). There is no live virus in this vaccine. Some people should not get this vaccine Tell your vaccine provider if you: 
· Have any severe, life-threatening allergies. A person who has ever had a life-threatening allergic reaction after a dose of recombinant shingles vaccine, or has a severe allergy to any component of this vaccine, may be advised not to be vaccinated. Ask your health care provider if you want information about vaccine components. · Are pregnant or breastfeeding. There is not much information about use of recombinant shingles vaccine in pregnant or nursing women. Your healthcare provider might recommend delaying vaccination. · Are not feeling well. If you have a mild illness, such as a cold, you can probably get the vaccine today. If you are moderately or severely ill, you should probably wait until you recover. Your doctor can advise you. Risks of a vaccine reaction With any medicine, including vaccines, there is a chance of reactions. After recombinant shingles vaccination, a person might experience: 
· Pain, redness, soreness, or swelling at the site of the injection · Headache, muscle aches, fever, shivering, fatigue In clinical trials, most people got a sore arm with mild or moderate pain after vaccination, and some also had redness and swelling where they got the shot. Some people felt tired, had muscle pain, a headache, shivering, fever, stomach pain, or nausea. About 1 out of 6 people who got recombinant zoster vaccine experienced side effects that prevented them from doing regular activities. Symptoms went away on their own in about 2 to 3 days. Side effects were more common in younger people. You should still get the second dose of recombinant zoster vaccine even if you had one of these reactions after the first dose. Other things that could happen after this vaccine: · People sometimes faint after medical procedures, including vaccination. Sitting or lying down for about 15 minutes can help prevent fainting and injuries caused by a fall. Tell your provider if you feel dizzy or have vision changes or ringing in the ears. · Some people get shoulder pain that can be more severe and longer-lasting than routine soreness that can follow injections. This happens very rarely. · Any medication can cause a severe allergic reaction. Such reactions to a vaccine are estimated at about 1 in a million doses, and would happen within a few minutes to a few hours after the vaccination. As with any medicine, there is a very remote chance of a vaccine causing a serious injury or death. The safety of vaccines is always being monitored. For more information, visit: www.cdc.gov/vaccinesafety/ What if there is a serious problem? What should I look for? · Look for anything that concerns you, such as signs of a severe allergic reaction, very high fever, or unusual behavior. Signs of a severe allergic reaction can include hives, swelling of the face and throat, difficulty breathing, a fast heartbeat, dizziness, and weakness. These would usually start a few minutes to a few hours after the vaccination. What should I do?  
· If you think it is a severe allergic reaction or other emergency that can't wait, call 9-1-1 or get to the nearest hospital. Otherwise, call your health care provider. · Afterward, the reaction should be reported to the Vaccine Adverse Event Reporting System (VAERS). Your doctor should file this report, or you can do it yourself through the VAERS website at www.vaers. [facility].gov, or by calling 9-214.156.8643. VAERS does not give medical advice. Jessica Truong How can I learn more? · Ask your health care provider. He or she can give you the vaccine package insert or suggest other sources of information. · Call your local or state health department. · Contact the Centers for Disease Control and Prevention (CDC): 
? Call 5-519.484.8195 (1-800-CDC-INFO) or 
? Visit CDC's website at www.cdc.gov/vaccines Vaccine Information Statement (Interim) Recombinant Zoster Vaccine 2/12/2018 Department of [facility] and [employer] Centers for Disease Control and Prevention Many Vaccine Information Statements are available in Upper sorbian and other languages. See www.immunize.org/vis. Hojas de Información Sobre Vacunas están disponibles en Español y en muchos otros idiomas. [employer].si Care instructions adapted under license by [employer] (which disclaims liability or warranty for this information). If you have questions about a medical condition or this instruction, always ask your healthcare professional. [address] any warranty or liability for your use of this information.

## 2019-10-03 NOTE — PROGRESS NOTES
Cleo Bowie  Identified pt with two pt identifiers(name and ). Chief Complaint   Patient presents with    Hypertension    Immunization/Injection     Per Dr. Kelle Merritt verbal order read back orders placed for flu vaccine. Influenza vaccine 0.5 ml given right deltoid IM. VIS given. Patient tolerated injection with no complications. 1. Have you been to the ER, urgent care clinic since your last visit? Hospitalized since your last visit? NO    2. Have you seen or consulted any other health care providers outside of the [address] [place] [address] since your last visit? Include any pap smears or colon screening. NO    Today's provider has been notified of reason for visit, vitals and flowsheets obtained on patients.      Patient received paperwork for advance directive during previous visit but has not completed at this time     Reviewed record In preparation for visit, huddled with provider and have obtained necessary documentation      Health Maintenance Due   Topic    Pneumococcal 0-64 years (1 of 3 - PCV13)    DTaP/Tdap/Td series (1 - Tdap)    Shingrix Vaccine Age 49> (1 of 2)    BREAST CANCER SCRN MAMMOGRAM     Influenza Age 5 to Adult        Wt Readings from Last 3 Encounters:   10/03/19 206 lb (93.4 kg)   19 202 lb (91.6 kg)   19 206 lb (93.4 kg)     Temp Readings from Last 3 Encounters:   10/03/19 98.7 °F (37.1 °C) (Oral)   19 98.3 °F (36.8 °C) (Oral)   19 98.6 °F (37 °C) (Oral)     BP Readings from Last 3 Encounters:   10/03/19 143/82   19 (!) 144/94   19 (!) 145/99     Pulse Readings from Last 3 Encounters:   10/03/19 67   19 72   19 67     Vitals:    10/03/19 0812   BP: 143/82   Pulse: 67   Resp: 16   Temp: 98.7 °F (37.1 °C)   TempSrc: Oral   SpO2: 98%   Weight: 206 lb (93.4 kg)   Height: 5' 5\" (1.651 m)   PainSc:  10 - Worst pain ever         Learning Assessment:  :     Learning Assessment 2017   PRIMARY LEARNER Patient   PRIMARY LANGUAGE ENGLISH   LEARNER PREFERENCE PRIMARY DEMONSTRATION   ANSWERED BY patient   RELATIONSHIP SELF       Depression Screening:  :     3 most recent PHQ Screens 6/7/2019   Little interest or pleasure in doing things Nearly every day   Feeling down, depressed, irritable, or hopeless Nearly every day   Total Score PHQ 2 6   Trouble falling or staying asleep, or sleeping too much Nearly every day   Feeling tired or having little energy Nearly every day   Poor appetite, weight loss, or overeating Nearly every day   Feeling bad about yourself - or that you are a failure or have let yourself or your family down Not at all   Trouble concentrating on things such as school, work, reading, or watching TV Nearly every day   Moving or speaking so slowly that other people could have noticed; or the opposite being so fidgety that others notice Nearly every day   Thoughts of being better off dead, or hurting yourself in some way Not at all   PHQ 9 Score 21   How difficult have these problems made it for you to do your work, take care of your home and get along with others Extremely difficult       Fall Risk Assessment:  :     No flowsheet data found. Abuse Screening:  :     No flowsheet data found.     ADL Screening:  :     ADL Assessment 6/7/2019   Feeding yourself No Help Needed   Getting from bed to chair No Help Needed   Getting dressed No Help Needed   Bathing or showering No Help Needed   Walk across the room (includes cane/walker) No Help Needed   Using the telphone No Help Needed   Taking your medications No Help Needed   Preparing meals No Help Needed   Managing money (expenses/bills) No Help Needed   Moderately strenuous housework (laundry) No Help Needed   Shopping for personal items (toiletries/medicines) No Help Needed   Shopping for groceries No Help Needed   Driving No Help Needed   Climbing a flight of stairs No Help Needed   Getting to places beyond walking distances No Help Needed                 Medication reconciliation up to date and corrected with patient at this time.

## 2019-10-03 NOTE — PROGRESS NOTES
CC:   Chief Complaint   Patient presents with    Hypertension    Immunization/Injection       HISTORY OF PRESENT ILLNESS  Remington Florian is a 61 y.o. female. Presents for 3 month follow up evaluation. Did not follow up in 3 weeks for BP check as instructed. She has HTN, CKD stage 3, hypothyroidism, hyperlipidemia, major depression, anxiety disorder with panic attacks, GERD, history of gastric bypass in 2013 with loss of 145 lbs, history of ischemic colitis in 2009, and family history of colon polyps and kidney disease requiring hemodialysis.        Today she complains of much pain at neck over the past 3-4 weeks. Neck pain for years but worse recently. Denies recent injury or trauma. No relief with heat or Tylenol, takes Advil with relief. Sits in front of a computer typing for long periods of time. Also has pain at both knees, arms, fingers, and shoulders. Also complains of feeling very tired. Sleeping well. Stays active caring for grandchildren but no formal exercise. Forgets to take ergocalciferol weekly; prefers a daily medication. Depressed mood a little better. Following with therapist Dameon Aguirre, [address]). Has an appointment next week to see a psychiatrist (Dr. Sonny Kwon) at [facility]. Stressors: Death of her son in Nov. 2014 in a motorcycle accident. Denies suicidal or homicidal ideation. Thyroid Review  Patient is seen for hypothyroidism. Has fatigue. Thyroid ROS: denies cold intolerance, bowel/skin changes or CVS symptoms. Taking medication as prescribed.     Soc Hx  . Has 1 living son, 2 step-daughters and 9 grandchildren. Works from home as a  for [employer]. Smokes 1 ppd x 30 yrs, quit previously for 2 yrs before gastric bypass. Drinks occasional mixed drinks. Denies recreational drug use. Drinks 2 cups of coffee daily and drinks several cups of tea daily.       Health Maintenance  Flu vaccine: 10/3/19                   Pneumonia vaccine: due for this, would like to postpone to next visit                Tetanus vaccine: due for this                          Shingles vaccine: due for this, will check at her pharmacy   Pap smear: 9/8/17, [address] [place] [address], next due 9/8/20  Mammogram: scheduled in 11/19, [address] [place] [address]  Colonoscopy:  12/31/12, scheduled for colonoscopy in 11/19 with Dr. Nilesh Arguelles exam: 2018, cataracts, [facility]  Lipids: 6/7/19 (tot chol 206, )  A1c: 6/7/19 (5.9%)  Advanced Directives: given information  End of Life: given information                 ROS  A complete review of systems was performed and is negative except for those mentioned in the HPI. Patient Active Problem List   Diagnosis Code    Hypertension I10    LVH (left ventricular hypertrophy) due to hypertensive disease I11.9    Venous stasis I87.8    Severe episode of recurrent major depressive disorder, without psychotic features (HCC) F33.2    Panic disorder F41.0    Acquired hypothyroidism E03.9    Vitamin D deficiency E55.9    Chronic insomnia F51.04    Gastroesophageal reflux disease without esophagitis K21.9    History of gastric bypass Z98.84    Tobacco use Z72.0    Obesity (BMI 30-39. 9) E66.9    IGT (impaired glucose tolerance) R73.02    Stage 3 chronic kidney disease (HCC) N18.3    Prediabetes R73.03    Mixed hyperlipidemia E78.2     Past Medical History:   Diagnosis Date    Anxiety disorder     Arthritis     knee arthritis    Depression     Fatigue     GERD (gastroesophageal reflux disease)     Headache     Heart disease     Hiatal hernia     Hypertension     Ischemic colon ([facility] [address] 2009    Kidney disease     Leg swelling     LVH (left ventricular hypertrophy) due to hypertensive disease     dr Saeed Patrick Morbid obesity ([facility] [address]     Postmenopausal     Short of breath on exertion     Thyroid disease  TIA (transient ischemic attack)     Venous stasis     Vertigo      Allergies   Allergen Reactions    Lisinopril Cough    Lortab [Hydrocodone-Acetaminophen] Itching    Morphine Nausea and Vomiting       Current Outpatient Medications   Medication Sig Dispense Refill    buPROPion XL (WELLBUTRIN XL) 300 mg XL tablet Take 1 Tab by mouth every morning. 90 Tab 3    raNITIdine (ZANTAC) 300 mg tab TAKE 1 TABLET BY MOUTH NIGHTLY 30 Tab 1    QUEtiapine (SEROQUEL) 50 mg tablet Take 2 Tabs by mouth nightly. 60 Tab 5    DULoxetine (CYMBALTA) 30 mg capsule Take 4 Caps by mouth daily. 120 Cap 5    losartan (COZAAR) 50 mg tablet Take 1 Tab by mouth daily. Indications: high blood pressure 90 Tab 1    hydroCHLOROthiazide (HYDRODIURIL) 12.5 mg tablet Take 1 Tab by mouth daily. Indications: high blood pressure 90 Tab 1    dilTIAZem (TAZTIA XT) 360 mg SR capsule TAKE ONE CAPSULE BY MOUTH EVERY DAY 90 Cap 3    atorvastatin (LIPITOR) 10 mg tablet Take 1 Tab by mouth daily. 30 Tab 5    ergocalciferol (ERGOCALCIFEROL) 50,000 unit capsule Take 1 Cap by mouth every seven (7) days. 4 Cap 5    alpha-D-galactosidase (BEANO PO) Take  by mouth.  simethicone (GAS-X PO) Take  by mouth.  LORazepam (ATIVAN) 0.5 mg tablet TAKE ONE BY MOUTH DAILY AS NEEDED FOR ANXIETY 30 Tab 0    levothyroxine (SYNTHROID) 100 mcg tablet Take 1 Tab by mouth Daily (before breakfast). 30 Tab 1         PHYSICAL EXAM  Visit Vitals  /85   Pulse 67   Temp 98.7 °F (37.1 °C) (Oral)   Resp 16   Ht 5' 5\" (1.651 m)   Wt 206 lb (93.4 kg)   SpO2 98%   BMI 34.28 kg/m²   4 lb weight increase since last clinic visit 3 months ago    General: Obese, no distress. HEENT:  Head normocephalic/atraumatic, no scleral icterus  Lungs:  Clear to ausculation bilaterally. Good air movement. Heart:  Regular rate and rhythm, normal S1 and S2, no murmur, gallop, or rub  Extremities: No clubbing, cyanosis, or edema.    Musculoskeletal: No tenderness along cervical spine with mild decrease in ROM with extension. Normal ROM with crepitus at both knees. Neurological: Alert and oriented x 3. Psychiatric: Normal mood and affect. Behavior is normal.         ASSESSMENT AND PLAN    ICD-10-CM ICD-9-CM    1. Essential hypertension I10 401.9    2. Stage 3 chronic kidney disease (HCC) U17.5 408.5 METABOLIC PANEL, BASIC   3. Neck pain M54.2 723.1 XR SPINE CERV 4 OR 5 V      methylPREDNISolone (MEDROL DOSEPACK) 4 mg tablet   4. Chronic pain of both knees M25.561 719.46 XR KNEE LT 3 V    M25.562 338.29 XR KNEE RT 3 V    G89.29     5. Moderate episode of recurrent major depressive disorder (HCC) F33.1 296.32    6. Acquired hypothyroidism E03.9 244.9 TSH RFX ON ABNORMAL TO FREE T4   7. Vitamin D deficiency E55.9 268.9 VITAMIN D, 25 HYDROXY      cholecalciferol (VITAMIN D3) 5,000 unit capsule   8. Mixed hyperlipidemia E78.2 272.2 LIPID PANEL   9. Obesity (BMI 30-39. 9) E66.9 278.00    10. Need for shingles vaccine Z23 V04.89    11. Encounter for immunization Z23 V03.89 INFLUENZA VIRUS VAC QUAD,SPLIT,PRESV FREE SYRINGE IM      OR IMMUNIZ ADMIN,1 SINGLE/COMB VAC/TOXOID     Diagnoses and all orders for this visit:    1. Essential hypertension  Controlled. Continue losartan, HCTZ, and diltiazem. 2. Stage 3 chronic kidney disease ([facility] [address]  Stop Advil and all NSAID's. -     METABOLIC PANEL, BASIC    3. Neck pain  May be OA from muscle strain. Treat pain with Medrol dose pack. After finishing, take ES Tylenol 2 tabs twice daily as needed. Consider PT if no relief. -     XR SPINE CERV 4 OR 5 V; Future  -     methylPREDNISolone (MEDROL DOSEPACK) 4 mg tablet; Use following package instructions for 6 days. 4. Chronic pain of both knees  Most likely due to OA. ES Tylenol as needed. -     XR KNEE LT 3 V; Future  -     XR KNEE RT 3 V; Future    5. Moderate episode of recurrent major depressive disorder (HCC)  Stable.   Continue Wellbutrin, Cymbalta, Seroquel, and lorazepam.    6. Acquired hypothyroidism  Because of fatigue, will recheck TSH. Last TSH 6/7/19 normal.  -     TSH RFX ON ABNORMAL TO FREE T4    7. Vitamin D deficiency  Would prefer daily vitamin D tablet rather than weekly tablet. Stop ergocalciferol and start cholecalciferol.  -     VITAMIN D, 25 HYDROXY  -     Start cholecalciferol (VITAMIN D3) 5,000 unit capsule; Take 1 Cap by mouth daily. Indications: low vitamin D levels    8. Mixed hyperlipidemia  -     LIPID PANEL    9. Obesity (BMI 30-39. 9)  Discussed the patient's BMI with her. The BMI follow up plan is as follows: dietary management education, guidance, and counseling; encourage exercise; monitor weight; prescribed dietary intake. Follow up BMI in 3 months. 10. Need for shingles vaccine    11. Encounter for immunization  -     INFLUENZA VIRUS VAC QUAD,SPLIT,PRESV FREE SYRINGE IM  -     OR IMMUNIZ ADMIN,1 SINGLE/COMB VAC/TOXOID      Follow-up and Dispositions    · Return in about 3 months (around 1/3/2020), or if symptoms worsen or fail to improve, for HTN, CKD, depression. I have discussed the diagnosis with the patient and the intended plan as seen in the above orders. Patient is in agreement. The patient has received an after-visit summary and questions were answered concerning future plans. I have discussed medication side effects and warnings with the patient as well.

## 2019-10-04 LAB
25(OH)D3+25(OH)D2 SERPL-MCNC: 20.5 NG/ML (ref 30–100)
BUN SERPL-MCNC: 16 MG/DL (ref 6–24)
BUN/CREAT SERPL: 13 (ref 9–23)
CALCIUM SERPL-MCNC: 9.2 MG/DL (ref 8.7–10.2)
CHLORIDE SERPL-SCNC: 103 MMOL/L (ref 96–106)
CHOLEST SERPL-MCNC: 160 MG/DL (ref 100–199)
CO2 SERPL-SCNC: 24 MMOL/L (ref 20–29)
CREAT SERPL-MCNC: 1.23 MG/DL (ref 0.57–1)
GLUCOSE SERPL-MCNC: 100 MG/DL (ref 65–99)
HDLC SERPL-MCNC: 49 MG/DL
LDLC SERPL CALC-MCNC: 78 MG/DL (ref 0–99)
POTASSIUM SERPL-SCNC: 4.7 MMOL/L (ref 3.5–5.2)
SODIUM SERPL-SCNC: 144 MMOL/L (ref 134–144)
T4 FREE SERPL-MCNC: 0.92 NG/DL (ref 0.82–1.77)
TRIGL SERPL-MCNC: 163 MG/DL (ref 0–149)
TSH SERPL DL<=0.005 MIU/L-ACNC: 5.07 UIU/ML (ref 0.45–4.5)
VLDLC SERPL CALC-MCNC: 33 MG/DL (ref 5–40)

## 2019-10-04 NOTE — PROGRESS NOTES
Message sent to patient by My Chart:  Your labs showed normal cholesterol (continue taking atorvastatin) and unchanged chronic kidney disease stage 3. Your vitamin D level is improving but still low. Start taking cholecalciferol (vitamin D3) 5000 units daily. Lastly, your thyroid level is a little low. Be sure to take levothyroxine every morning on an empty stomach with no other medications. Wait an hour before you take any other medications. Please call the clinic and schedule to have thyroid labs done one week before your next clinic visit.     Best regards,    Dr. Janette Lesches

## 2019-10-07 DIAGNOSIS — K21.9 GASTROESOPHAGEAL REFLUX DISEASE WITHOUT ESOPHAGITIS: Primary | ICD-10-CM

## 2019-10-07 RX ORDER — FAMOTIDINE 40 MG/1
40 TABLET, FILM COATED ORAL DAILY
Qty: 90 TAB | Refills: 3 | Status: SHIPPED | OUTPATIENT
Start: 2019-10-07 | End: 2020-04-22

## 2019-10-30 PROBLEM — F43.10 PTSD (POST-TRAUMATIC STRESS DISORDER): Status: ACTIVE | Noted: 2019-10-30

## 2019-10-30 PROBLEM — F42.9 OCD (OBSESSIVE COMPULSIVE DISORDER): Status: ACTIVE | Noted: 2019-10-30

## 2020-01-18 ENCOUNTER — HOSPITAL ENCOUNTER (EMERGENCY)
Age: 60
Discharge: HOME | End: 2020-01-18
Attending: EMERGENCY MEDICINE
Payer: COMMERCIAL

## 2020-01-18 VITALS
OXYGEN SATURATION: 97 % | BODY MASS INDEX: 35.45 KG/M2 | RESPIRATION RATE: 17 BRPM | HEART RATE: 70 BPM | TEMPERATURE: 98.7 F | DIASTOLIC BLOOD PRESSURE: 78 MMHG | WEIGHT: 207.67 LBS | HEIGHT: 64 IN | SYSTOLIC BLOOD PRESSURE: 158 MMHG

## 2020-01-18 DIAGNOSIS — I16.0 HYPERTENSIVE URGENCY: Primary | ICD-10-CM

## 2020-01-18 DIAGNOSIS — F41.0 PANIC ATTACK: ICD-10-CM

## 2020-01-18 DIAGNOSIS — F41.1 ANXIETY STATE: ICD-10-CM

## 2020-01-18 LAB
ANION GAP SERPL CALC-SCNC: 5 MMOL/L (ref 5–15)
BASOPHILS # BLD: 0 K/UL (ref 0–0.1)
BASOPHILS NFR BLD: 0 % (ref 0–1)
BUN SERPL-MCNC: 21 MG/DL (ref 6–20)
BUN/CREAT SERPL: 15 (ref 12–20)
CALCIUM SERPL-MCNC: 9.3 MG/DL (ref 8.5–10.1)
CHLORIDE SERPL-SCNC: 110 MMOL/L (ref 97–108)
CO2 SERPL-SCNC: 26 MMOL/L (ref 21–32)
CREAT SERPL-MCNC: 1.37 MG/DL (ref 0.55–1.02)
DIFFERENTIAL METHOD BLD: NORMAL
EOSINOPHIL # BLD: 0.1 K/UL (ref 0–0.4)
EOSINOPHIL NFR BLD: 1 % (ref 0–7)
ERYTHROCYTE [DISTWIDTH] IN BLOOD BY AUTOMATED COUNT: 13.7 % (ref 11.5–14.5)
GLUCOSE SERPL-MCNC: 98 MG/DL (ref 65–100)
HCT VFR BLD AUTO: 42.6 % (ref 35–47)
HGB BLD-MCNC: 13.9 G/DL (ref 11.5–16)
IMM GRANULOCYTES # BLD AUTO: 0 K/UL (ref 0–0.04)
IMM GRANULOCYTES NFR BLD AUTO: 0 % (ref 0–0.5)
LYMPHOCYTES # BLD: 1.5 K/UL (ref 0.8–3.5)
LYMPHOCYTES NFR BLD: 20 % (ref 12–49)
MCH RBC QN AUTO: 29.7 PG (ref 26–34)
MCHC RBC AUTO-ENTMCNC: 32.6 G/DL (ref 30–36.5)
MCV RBC AUTO: 91 FL (ref 80–99)
MONOCYTES # BLD: 0.5 K/UL (ref 0–1)
MONOCYTES NFR BLD: 7 % (ref 5–13)
NEUTS SEG # BLD: 5.4 K/UL (ref 1.8–8)
NEUTS SEG NFR BLD: 72 % (ref 32–75)
NRBC # BLD: 0 K/UL (ref 0–0.01)
NRBC BLD-RTO: 0 PER 100 WBC
PLATELET # BLD AUTO: 273 K/UL (ref 150–400)
PMV BLD AUTO: 9.2 FL (ref 8.9–12.9)
POTASSIUM SERPL-SCNC: 3.8 MMOL/L (ref 3.5–5.1)
RBC # BLD AUTO: 4.68 M/UL (ref 3.8–5.2)
SODIUM SERPL-SCNC: 141 MMOL/L (ref 136–145)
TROPONIN I SERPL-MCNC: <0.05 NG/ML
WBC # BLD AUTO: 7.5 K/UL (ref 3.6–11)

## 2020-01-18 PROCEDURE — 74011250637 HC RX REV CODE- 250/637: Performed by: EMERGENCY MEDICINE

## 2020-01-18 PROCEDURE — 99284 EMERGENCY DEPT VISIT MOD MDM: CPT

## 2020-01-18 PROCEDURE — 84484 ASSAY OF TROPONIN QUANT: CPT

## 2020-01-18 PROCEDURE — 80048 BASIC METABOLIC PNL TOTAL CA: CPT

## 2020-01-18 PROCEDURE — 93005 ELECTROCARDIOGRAM TRACING: CPT

## 2020-01-18 PROCEDURE — 36415 COLL VENOUS BLD VENIPUNCTURE: CPT

## 2020-01-18 PROCEDURE — 85025 COMPLETE CBC W/AUTO DIFF WBC: CPT

## 2020-01-18 RX ORDER — HYDROCHLOROTHIAZIDE 25 MG/1
25 TABLET ORAL
Status: COMPLETED | OUTPATIENT
Start: 2020-01-18 | End: 2020-01-18

## 2020-01-18 RX ORDER — LORAZEPAM 1 MG/1
1 TABLET ORAL
Status: COMPLETED | OUTPATIENT
Start: 2020-01-18 | End: 2020-01-18

## 2020-01-18 RX ORDER — LOSARTAN POTASSIUM 50 MG/1
50 TABLET ORAL DAILY
Status: DISCONTINUED | OUTPATIENT
Start: 2020-01-19 | End: 2020-01-18

## 2020-01-18 RX ORDER — LOSARTAN POTASSIUM 50 MG/1
50 TABLET ORAL
Status: COMPLETED | OUTPATIENT
Start: 2020-01-18 | End: 2020-01-18

## 2020-01-18 RX ADMIN — LORAZEPAM 1 MG: 1 TABLET ORAL at 14:51

## 2020-01-18 RX ADMIN — HYDROCHLOROTHIAZIDE 25 MG: 25 TABLET ORAL at 14:51

## 2020-01-18 RX ADMIN — LOSARTAN POTASSIUM 50 MG: 50 TABLET, FILM COATED ORAL at 15:03

## 2020-01-18 NOTE — DISCHARGE INSTRUCTIONS
Your blood pressure was elevated today more than usual.  Your heart and lungs and brain appear to be functioning normally. Continue to take your blood pressure and anxiety medications as prescribed, and follow-up with your primary care physician. If you notice worsening chest pain or shortness of breath or other concerning symptoms, return to the emergency department for further evaluation.

## 2020-01-18 NOTE — ED PROVIDER NOTES
EMERGENCY DEPARTMENT HISTORY AND PHYSICAL EXAM      Date: 1/18/2020  Patient Name: Kody Lawrence  Patient Age and Sex: 61 y.o. female    History of Presenting Illness     Chief Complaint   Patient presents with    Chest Pain     began today       History Provided By: Patient    Ability to gather history was limited by: None    HPI: Kody Lawrence, 61 y.o. female with history of anxiety, hypertension, complains of mild chest tightness and shortness of breath and anxiety since waking this morning. She states she took her blood pressure and it was very high, so she went to local [facility] and had it rechecked, blood pressure was 218/126. She was sent to the emergency department for further evaluation. She reports that she feels very anxious, takes lorazepam regularly for chronic anxiety. She did take her blood pressure medicines this morning. She does not have chest pain. Pt denies any other alleviating or exacerbating factors. There are no other complaints, changes or physical findings at this time. Past Medical History:   Diagnosis Date    Anxiety disorder     Arthritis     knee arthritis    Depression     Fatigue     GERD (gastroesophageal reflux disease)     Headache     Heart disease     Hiatal hernia     Hypertension     Ischemic colon ([address] 2009    Kidney disease     Leg swelling     LVH (left ventricular hypertrophy) due to hypertensive disease     dr [facility]    Morbid obesity ([address]     Postmenopausal     Short of breath on exertion     Thyroid disease     TIA (transient ischemic attack)     Venous stasis     Vertigo      Past Surgical History:   Procedure Laterality Date    COLONOSCOPY,DIAGNOSTIC  12/31/2012    Dr. Liana Pimentel.  Int hemorrhoids, repeat in 10 yrs    ENDOSCOPY, COLON, DIAGNOSTIC  03/2009    3/09- repeat q 3 for ischemic colitis    HX CHOLECYSTECTOMY      HX GYN  5946,[id number]    2 c-sections    HX GYN  2009    endometrial ablation    HX LAP GASTRIC BYPASS 03/28/2013    Dr. Atilio Hurd         PCP: Ryann Luis MD    Past History     Past Medical History:  Past Medical History:   Diagnosis Date    Anxiety disorder     Arthritis     knee arthritis    Depression     Fatigue     GERD (gastroesophageal reflux disease)     Headache     Heart disease     Hiatal hernia     Hypertension     Ischemic colon ([facility] [address] 2009    Kidney disease     Leg swelling     LVH (left ventricular hypertrophy) due to hypertensive disease     dr Saxena Session   [facility] Morbid obesity ([facility] [address]     Postmenopausal     Short of breath on exertion     Thyroid disease     TIA (transient ischemic attack)     Venous stasis     Vertigo        Past Surgical History:  Past Surgical History:   Procedure Laterality Date    COLONOSCOPY,DIAGNOSTIC  12/31/2012    Dr. Selene Andrea.  Int hemorrhoids, repeat in 10 yrs    ENDOSCOPY, COLON, DIAGNOSTIC  03/2009    3/09- repeat q 3 for ischemic colitis    HX CHOLECYSTECTOMY      HX GYN  8817,6730    2 c-sections    HX GYN  2009    endometrial ablation    HX LAP GASTRIC BYPASS  03/28/2013    Dr. Atilio Hurd         Family History:  Family History   Problem Relation Age of Onset    Heart Disease Mother         PULM HTN    Stroke Mother     Hypertension Mother     Colon Polyps Mother     Kidney Disease Mother         DIALYSIS PT    Thyroid Disease Mother     Other Mother         BLOOD CLOT HX    Diabetes Father     Hypertension Father     Colon Polyps Father     Heart Disease Father     Psychiatric Disorder Father         ALZEHEIMERS    Diabetes Brother     Hypertension Brother     Kidney Disease Maternal Aunt         DIALYSIS    Kidney Disease Maternal Uncle     Kidney Disease Maternal Grandmother     Heart Disease Maternal Grandmother     Diabetes Paternal Grandfather     Kidney Disease Maternal Aunt         DIALYSIS    Kidney Disease Maternal Uncle         DIALYSIS    Other Son     Colon Polyps Maternal Grandfather        Social History:  Social History     Tobacco Use    Smoking status: Current Every Day Smoker     Packs/day: 1.00    Smokeless tobacco: Never Used   Substance Use Topics    Alcohol use: No    Drug use: No       Allergies: Allergies   Allergen Reactions    Lisinopril Cough    Lortab [Hydrocodone-Acetaminophen] Itching    Morphine Nausea and Vomiting       Current Medications:  No current facility-administered medications on file prior to encounter. Current Outpatient Medications on File Prior to Encounter   Medication Sig Dispense Refill    famotidine (PEPCID) 40 mg tablet Take 1 Tab by mouth daily. Replaces Zantac. 90 Tab 3    methylPREDNISolone (MEDROL DOSEPACK) 4 mg tablet Use following package instructions for 6 days. 1 Dose Pack 0    cholecalciferol (VITAMIN D3) 5,000 unit capsule Take 1 Cap by mouth daily. Indications: low vitamin D levels 30 Cap 5    buPROPion XL (WELLBUTRIN XL) 300 mg XL tablet Take 1 Tab by mouth every morning. 90 Tab 3    QUEtiapine (SEROQUEL) 50 mg tablet Take 2 Tabs by mouth nightly. 60 Tab 5    DULoxetine (CYMBALTA) 30 mg capsule Take 4 Caps by mouth daily. 120 Cap 5    losartan (COZAAR) 50 mg tablet Take 1 Tab by mouth daily. Indications: high blood pressure 90 Tab 1    hydroCHLOROthiazide (HYDRODIURIL) 12.5 mg tablet Take 1 Tab by mouth daily. Indications: high blood pressure 90 Tab 1    dilTIAZem (TAZTIA XT) 360 mg SR capsule TAKE ONE CAPSULE BY MOUTH EVERY DAY 90 Cap 3    atorvastatin (LIPITOR) 10 mg tablet Take 1 Tab by mouth daily. 30 Tab 5    alpha-D-galactosidase (BEANO PO) Take  by mouth.  simethicone (GAS-X PO) Take  by mouth.  LORazepam (ATIVAN) 0.5 mg tablet TAKE ONE BY MOUTH DAILY AS NEEDED FOR ANXIETY 30 Tab 0    levothyroxine (SYNTHROID) 100 mcg tablet Take 1 Tab by mouth Daily (before breakfast). 30 Tab 1       Review of Systems   Review of Systems   Constitutional: Negative for fever. Respiratory: Positive for chest tightness and shortness of breath. Cardiovascular: Negative for chest pain, palpitations and leg swelling. Psychiatric/Behavioral: The patient is nervous/anxious. All other systems reviewed and are negative. Physical Exam   Vital Signs  Patient Vitals for the past 24 hrs:   Temp Pulse Resp BP SpO2   01/18/20 1503  74  (!) 144/128    01/18/20 1500  72 15 (!) 144/128 97 %   01/18/20 1451  73  (!) 179/100    01/18/20 1424 98.2 °F (36.8 °C) 86 18 (!) 186/95 100 %       Physical Exam  Vitals signs and nursing note reviewed. Constitutional:       General: She is not in acute distress. Appearance: She is well-developed. HENT:      Head: Normocephalic and atraumatic. Mouth/Throat:      Mouth: Mucous membranes are moist.   Eyes:      General:         Right eye: No discharge. Left eye: No discharge. Conjunctiva/sclera: Conjunctivae normal.   Neck:      Musculoskeletal: Normal range of motion and neck supple. Cardiovascular:      Rate and Rhythm: Normal rate and regular rhythm. Heart sounds: Normal heart sounds. No murmur. Pulmonary:      Effort: Pulmonary effort is normal. No respiratory distress. Breath sounds: Normal breath sounds. No wheezing. Abdominal:      General: There is no distension. Palpations: Abdomen is soft. Tenderness: There is no tenderness. Musculoskeletal: Normal range of motion. General: No deformity. Skin:     General: Skin is warm and dry. Findings: No rash. Neurological:      Mental Status: She is alert and oriented to person, place, and time. Psychiatric:         Mood and Affect: Mood is anxious. Behavior: Behavior normal.         Thought Content:  Thought content normal.         Diagnostic Study Results   Labs  Recent Results (from the past 24 hour(s))   EKG, 12 LEAD, INITIAL    Collection Time: 01/18/20  2:34 PM   Result Value Ref Range    Ventricular Rate 80 BPM Atrial Rate 80 BPM    P-R Interval 160 ms    QRS Duration 92 ms    Q-T Interval 400 ms    QTC Calculation (Bezet) 461 ms    Calculated P Axis 64 degrees    Calculated R Axis 20 degrees    Calculated T Axis 56 degrees    Diagnosis       Normal sinus rhythm  Possible Left atrial enlargement  When compared with ECG of 16-JAN-2017 16:06,  No significant change was found     CBC WITH AUTOMATED DIFF    Collection Time: 01/18/20  2:53 PM   Result Value Ref Range    WBC 7.5 3.6 - 11.0 K/uL    RBC 4.68 3.80 - 5.20 M/uL    HGB 13.9 11.5 - 16.0 g/dL    HCT 42.6 35.0 - 47.0 %    MCV 91.0 80.0 - 99.0 FL    MCH 29.7 26.0 - 34.0 PG    MCHC 32.6 30.0 - 36.5 g/dL    RDW 13.7 11.5 - 14.5 %    PLATELET 949 592 - 032 K/uL    MPV 9.2 8.9 - 12.9 FL    NRBC 0.0 0  WBC    ABSOLUTE NRBC 0.00 0.00 - 0.01 K/uL    NEUTROPHILS 72 32 - 75 %    LYMPHOCYTES 20 12 - 49 %    MONOCYTES 7 5 - 13 %    EOSINOPHILS 1 0 - 7 %    BASOPHILS 0 0 - 1 %    IMMATURE GRANULOCYTES 0 0.0 - 0.5 %    ABS. NEUTROPHILS 5.4 1.8 - 8.0 K/UL    ABS. LYMPHOCYTES 1.5 0.8 - 3.5 K/UL    ABS. MONOCYTES 0.5 0.0 - 1.0 K/UL    ABS. EOSINOPHILS 0.1 0.0 - 0.4 K/UL    ABS. BASOPHILS 0.0 0.0 - 0.1 K/UL    ABS. IMM.  GRANS. 0.0 0.00 - 0.04 K/UL    DF AUTOMATED     METABOLIC PANEL, BASIC    Collection Time: 01/18/20  2:53 PM   Result Value Ref Range    Sodium 141 136 - 145 mmol/L    Potassium 3.8 3.5 - 5.1 mmol/L    Chloride 110 (H) 97 - 108 mmol/L    CO2 26 21 - 32 mmol/L    Anion gap 5 5 - 15 mmol/L    Glucose 98 65 - 100 mg/dL    BUN 21 (H) 6 - 20 MG/DL    Creatinine 1.37 (H) 0.55 - 1.02 MG/DL    BUN/Creatinine ratio 15 12 - 20      GFR est AA 48 (L) >60 ml/min/1.73m2    GFR est non-AA 39 (L) >60 ml/min/1.73m2    Calcium 9.3 8.5 - 10.1 MG/DL   TROPONIN I    Collection Time: 01/18/20  2:53 PM   Result Value Ref Range    Troponin-I, Qt. <0.05 <0.05 ng/mL       Radiologic Studies  No orders to display     CT Results  (Last 48 hours)    None        CXR Results  (Last 48 hours) None          Procedures   EKG  Date/Time: 1/18/2020 2:51 PM  Performed by: Joshua Mckeon MD  Authorized by: Joshua Mckeon MD     ECG reviewed by ED Physician in the absence of a cardiologist: yes    Interpretation:     Interpretation: non-specific    Rate:     ECG rate assessment: normal    Rhythm:     Rhythm: sinus rhythm    Ectopy:     Ectopy: none    QRS:     QRS axis:  Normal  ST segments:     ST segments:  Normal  T waves:     T waves: normal          Medical Decision Making     Provider Notes (Medical Decision Making):   14-year-old female with mild chest tightness and shortness of breath in the setting of anxiety and blood pressure 218/126 at [facility]. No chest pain, no nausea or vomiting. On exam she appears quite anxious. Her blood pressure was in the 170/110 range initially in the ED. Doubt ACS or myocardial infarction. No significant concern for aortic dissection or PE. This seems more like uncomplicated hypertensive urgency and panic attack. Will treat with lorazepam, additional blood pressure agents, check screening laboratories including troponin, and reevaluate. Her EKG is nonischemic. Savannah Krishnamurthy MD  2:48 PM    BP much improved, pt feels well. No CP. No indication for serial troponins. D/C home. Betty Crane MD      Consult required? No      Medications Administered During ED Course:  Medications   hydroCHLOROthiazide (HYDRODIURIL) tablet 25 mg (25 mg Oral Given 1/18/20 1451)   LORazepam (ATIVAN) tablet 1 mg (1 mg Oral Given 1/18/20 1451)   losartan (COZAAR) tablet 50 mg (50 mg Oral Given 1/18/20 1503)          Diagnosis and Disposition     Disposition:  Discharged    Clinical Impression:   1. Hypertensive urgency    2. Anxiety state    3. Panic attack        Attestation:  I personally performed the services described in this documentation on this date 1/18/2020 for patient Jacques Fountain.     Savannah Krishnamurthy MD        I was the first provider for this patient on this visit. To the best of my ability I reviewed relevant prior medical records, electrocardiograms, laboratories, and radiologic studies. The patient's presenting problems were discussed, and the patient was in agreement with the care plan formulated and outlined with them. Cornell Blair MD    Please note that this dictation was completed with Dragon voice recognition software. Quite often unanticipated grammatical, syntax, homophones, and other interpretive errors are inadvertently transcribed by the computer software. Please disregard these errors and excuse any errors that have escaped final proofreading.

## 2020-01-19 LAB
ATRIAL RATE: 80 BPM
CALCULATED P AXIS, ECG09: 64 DEGREES
CALCULATED R AXIS, ECG10: 20 DEGREES
CALCULATED T AXIS, ECG11: 56 DEGREES
DIAGNOSIS, 93000: NORMAL
P-R INTERVAL, ECG05: 160 MS
Q-T INTERVAL, ECG07: 400 MS
QRS DURATION, ECG06: 92 MS
QTC CALCULATION (BEZET), ECG08: 461 MS
VENTRICULAR RATE, ECG03: 80 BPM

## 2020-01-20 ENCOUNTER — TELEPHONE (OUTPATIENT)
Dept: INTERNAL MEDICINE CLINIC | Facility: CLINIC | Age: 60
End: 2020-01-20

## 2020-01-20 ENCOUNTER — OFFICE VISIT (OUTPATIENT)
Dept: INTERNAL MEDICINE CLINIC | Facility: CLINIC | Age: 60
End: 2020-01-20

## 2020-01-20 VITALS
BODY MASS INDEX: 35.82 KG/M2 | OXYGEN SATURATION: 97 % | SYSTOLIC BLOOD PRESSURE: 170 MMHG | WEIGHT: 209.8 LBS | DIASTOLIC BLOOD PRESSURE: 98 MMHG | HEIGHT: 64 IN | RESPIRATION RATE: 16 BRPM | HEART RATE: 67 BPM | TEMPERATURE: 98 F

## 2020-01-20 DIAGNOSIS — N18.30 STAGE 3 CHRONIC KIDNEY DISEASE (HCC): ICD-10-CM

## 2020-01-20 DIAGNOSIS — I10 ESSENTIAL HYPERTENSION: Primary | ICD-10-CM

## 2020-01-20 RX ORDER — LOSARTAN POTASSIUM 100 MG/1
100 TABLET ORAL DAILY
Qty: 90 TAB | Refills: 0 | Status: SHIPPED | OUTPATIENT
Start: 2020-01-20 | End: 2021-05-17

## 2020-01-20 RX ORDER — FLUOXETINE HYDROCHLORIDE 20 MG/1
CAPSULE ORAL
COMMUNITY
Start: 2020-01-09 | End: 2020-05-04

## 2020-01-20 NOTE — PROGRESS NOTES
HPI  Venda Bosworth is a 61y.o. year old female patient of Robert Hartman MD who presents with c/o elevated BP. Pt has history of has Hypertension, LVH (left ventricular hypertrophy) due to hypertensive disease, Venous stasis, Severe episode of recurrent major depressive disorder, without psychotic features ([address] Panic disorder, Acquired hypothyroidism, Vitamin D deficiency, Chronic insomnia, Gastroesophageal reflux disease without esophagitis, History of gastric bypass, Tobacco use, Obesity (BMI 30-39.9), IGT (impaired glucose tolerance), Stage 3 chronic kidney disease (HCC), Prediabetes, Mixed hyperlipidemia, Chronic pain of both knees, Neck pain, OCD (obsessive compulsive disorder), and PTSD (post-traumatic stress disorder) on their problem list..    Pt c/o uncontrolled BP. Pt has hx of HTN and CKD stage 3. Currently takes losartan 50mg, HCTZ 12.5, and diltiazem 360mg. Bp controlled at last f/u with Dr. Vick Sebastian in October. Pt was seen in the ED on 1-18-20 for hypertensive urgency, chest pain, SOB, EKG WNL, /100 range, troponin negative, CBC normal, BMP with CKD slightly worsened Cr, GFR at baseline. States has had off and on chest pain, SOB, can feel her pulse. At her psychiatrist appt in December BP was 160s/100s. Denies any missed doses of her medication. States BP at home was 220/120s. Brought record with her and numbers were 170-215s/90s-100s over the weekend. States BP is better first thing in the AM and then gets higher as day goes on. Has off and on shortness of breath and chest tightness with activity today. Has had bad headache since Friday, took tylenol, HA better today. Denies blurred vision or dizziness. Denies recent illness. She follows with psychiatry for anxiety.       Patient Active Problem List   Diagnosis Code    Hypertension I10    LVH (left ventricular hypertrophy) due to hypertensive disease I11.9    Venous stasis I87.8    Severe episode of recurrent major depressive disorder, without psychotic features ([facility] F33.2    Panic disorder F41.0    Acquired hypothyroidism E03.9    Vitamin D deficiency E55.9    Chronic insomnia F51.04    Gastroesophageal reflux disease without esophagitis K21.9    History of gastric bypass Z98.84    Tobacco use Z72.0    Obesity (BMI 30-39. 9) E66.9    IGT (impaired glucose tolerance) R73.02    Stage 3 chronic kidney disease (HCC) N18.3    Prediabetes R73.03    Mixed hyperlipidemia E78.2    Chronic pain of both knees M25.561, M25.562, G89.29    Neck pain M54.2    OCD (obsessive compulsive disorder) F42.9    PTSD (post-traumatic stress disorder) F43.10     Past Medical History:   Diagnosis Date    Anxiety disorder     Arthritis     knee arthritis    Depression     Fatigue     GERD (gastroesophageal reflux disease)     Headache     Heart disease     Hiatal hernia     Hypertension     Ischemic colon ([facility] 2009    Kidney disease     Leg swelling     LVH (left ventricular hypertrophy) due to hypertensive disease     dr Rabia Webb    Morbid obesity ([facility]     Postmenopausal     Short of breath on exertion     Thyroid disease     TIA (transient ischemic attack)     Venous stasis     Vertigo      Past Surgical History:   Procedure Laterality Date    COLONOSCOPY,DIAGNOSTIC  12/31/2012    Dr. Francoise Cardenas.  Int hemorrhoids, repeat in 10 yrs    ENDOSCOPY, COLON, DIAGNOSTIC  03/2009    3/09- repeat q 3 for ischemic colitis    HX CHOLECYSTECTOMY      HX GYN  [id number]    2 c-sections    HX GYN  2009    endometrial ablation    HX LAP GASTRIC BYPASS  03/28/2013    Dr. Castillo Harness History     Socioeconomic History    Marital status:      Spouse name: Not on file    Number of children: Not on file    Years of education: Not on file    Highest education level: Not on file   Tobacco Use    Smoking status: Current Every Day Smoker     Packs/day: 1.00    Smokeless tobacco: Never Used   Substance and Sexual Activity    Alcohol use: No    Drug use: No    Sexual activity: Yes     Family History   Problem Relation Age of Onset    Heart Disease Mother         PULM HTN    Stroke Mother     Hypertension Mother     Colon Polyps Mother     Kidney Disease Mother         DIALYSIS PT    Thyroid Disease Mother     Other Mother         BLOOD CLOT HX    Diabetes Father     Hypertension Father     Colon Polyps Father     Heart Disease Father     Psychiatric Disorder Father         ZAY    Diabetes Brother     Hypertension Brother     Kidney Disease Maternal Aunt         DIALYSIS    Kidney Disease Maternal Uncle     Kidney Disease Maternal Grandmother     Heart Disease Maternal Grandmother     Diabetes Paternal Grandfather     Kidney Disease Maternal Aunt         DIALYSIS    Kidney Disease Maternal Uncle         DIALYSIS    Other Son     Colon Polyps Maternal Grandfather      Allergies   Allergen Reactions    Lisinopril Cough    Lortab [Hydrocodone-Acetaminophen] Itching    Morphine Nausea and Vomiting       MEDICATIONS  Current Outpatient Medications   Medication Sig    FLUoxetine (PROZAC) 20 mg capsule     losartan (COZAAR) 100 mg tablet Take 1 Tab by mouth daily. Indications: high blood pressure    famotidine (PEPCID) 40 mg tablet Take 1 Tab by mouth daily. Replaces Zantac.  cholecalciferol (VITAMIN D3) 5,000 unit capsule Take 1 Cap by mouth daily. Indications: low vitamin D levels    buPROPion XL (WELLBUTRIN XL) 300 mg XL tablet Take 1 Tab by mouth every morning.  QUEtiapine (SEROQUEL) 50 mg tablet Take 2 Tabs by mouth nightly.  DULoxetine (CYMBALTA) 30 mg capsule Take 4 Caps by mouth daily.  hydroCHLOROthiazide (HYDRODIURIL) 12.5 mg tablet Take 1 Tab by mouth daily. Indications: high blood pressure    dilTIAZem (TAZTIA XT) 360 mg SR capsule TAKE ONE CAPSULE BY MOUTH EVERY DAY    alpha-D-galactosidase (BEANO PO) Take  by mouth.     simethicone (GAS-X PO) Take  by mouth.  LORazepam (ATIVAN) 0.5 mg tablet TAKE ONE BY MOUTH DAILY AS NEEDED FOR ANXIETY    levothyroxine (SYNTHROID) 100 mcg tablet Take 1 Tab by mouth Daily (before breakfast). No current facility-administered medications for this visit. REVIEW OF SYSTEMS  Per HPI        Visit Vitals  BP (!) 170/98   Pulse 67   Temp 98 °F (36.7 °C) (Oral)   Resp 16   Ht 5' 4\" (1.626 m)   Wt 209 lb 12.8 oz (95.2 kg)   SpO2 97%   BMI 36.01 kg/m²         General: Well-developed, well-nourished. In no distress. A&O x 3. Head: Normocephalic, atraumatic. Eyes: Conjunctiva clear. Mouth/Throat: Lips, mucosa, and tongue normal.  Lungs: Clear to auscultation bilaterally. No crackles or wheezes. No use of accessory muscles. Speaks in full sentences without SOB. Chest Wall: No tenderness or deformity. Heart: RRR, normal S1 and S2, no murmur, click, rub, or gallop. Skin: No rashes or lesions. Neurological: CN II-XII intact. Neurovasc: No edema appreciated. Dorsalis pedis pulses are 2+ on the right side, and 2+ on the left side. Posterior tibial pulses are 2+ on the right side, and 2+ on the left side. Musculoskeletal: Gait normal.  Psychiatric: Normal mood and affect. Behavior is normal.       No results found for any visits on 01/20/20.     Lab Results   Component Value Date/Time    WBC 7.5 01/18/2020 02:53 PM    HGB 13.9 01/18/2020 02:53 PM    HCT 42.6 01/18/2020 02:53 PM    PLATELET 094 24/80/7485 02:53 PM    MCV 91.0 01/18/2020 02:53 PM     Lab Results   Component Value Date/Time    Sodium 141 01/18/2020 02:53 PM    Potassium 3.8 01/18/2020 02:53 PM    Chloride 110 (H) 01/18/2020 02:53 PM    CO2 26 01/18/2020 02:53 PM    Anion gap 5 01/18/2020 02:53 PM    Glucose 98 01/18/2020 02:53 PM    BUN 21 (H) 01/18/2020 02:53 PM    Creatinine 1.37 (H) 01/18/2020 02:53 PM    BUN/Creatinine ratio 15 01/18/2020 02:53 PM    GFR est AA 48 (L) 01/18/2020 02:53 PM    GFR est non-AA 39 (L) 01/18/2020 02:53 PM    Calcium 9.3 01/18/2020 02:53 PM     Lab Results   Component Value Date/Time    CK 22 10/28/2009 07:45 PM    CK - MB 0.2 10/28/2009 07:45 PM    CK-MB Index 0.9 10/28/2009 07:45 PM    Troponin-I, Qt. <0.05 01/18/2020 02:53 PM       ASSESSMENT and PLAN  Diagnoses and all orders for this visit:    Discussed plan with Dr. Hany Fowler who agrees. 1. Essential hypertension  -     losartan (COZAAR) 100 mg tablet; Take 1 Tab by mouth daily. Indications: high blood pressure MEDICATION INCREASED  BP uncontrolled, increase medicine as above  Return in 2 weeks, repeat BMP at that time  Continue to monitor BP at home once daily, call office if remains high or develops cp, sob or ulrich- or proceed to ED    2. Stage 3 chronic kidney disease (HCC)  Slight bump in Cr on recent labs, repeat BMP at next visit          Patient Instructions     Please contact our office if your symptoms worsen or do not improve. Please call 911 or go directly to the Emergency Department if you develop shortness of breath, chest pain, difficulty breathing or worsening of your symptoms. High Blood Pressure: Care Instructions  Overview    It's normal for blood pressure to go up and down throughout the day. But if it stays up, you have high blood pressure. Another name for high blood pressure is hypertension. Despite what a lot of people think, high blood pressure usually doesn't cause headaches or make you feel dizzy or lightheaded. It usually has no symptoms. But it does increase your risk of stroke, heart attack, and other problems. You and your doctor will talk about your risks of these problems based on your blood pressure. Your doctor will give you a goal for your blood pressure. Your goal will be based on your health and your age. Lifestyle changes, such as eating healthy and being active, are always important to help lower blood pressure.  You might also take medicine to reach your blood pressure goal.  Follow-up care is a key part of your treatment and safety. Be sure to make and go to all appointments, and call your doctor if you are having problems. It's also a good idea to know your test results and keep a list of the medicines you take. How can you care for yourself at home? Medical treatment  · If you stop taking your medicine, your blood pressure will go back up. You may take one or more types of medicine to lower your blood pressure. Be safe with medicines. Take your medicine exactly as prescribed. Call your doctor if you think you are having a problem with your medicine. · Talk to your doctor before you start taking aspirin every day. Aspirin can help certain people lower their risk of a heart attack or stroke. But taking aspirin isn't right for everyone, because it can cause serious bleeding. · See your doctor regularly. You may need to see the doctor more often at first or until your blood pressure comes down. · If you are taking blood pressure medicine, talk to your doctor before you take decongestants or anti-inflammatory medicine, such as ibuprofen. Some of these medicines can raise blood pressure. · Learn how to check your blood pressure at home. Lifestyle changes  · Stay at a healthy weight. This is especially important if you put on weight around the waist. Losing even 10 pounds can help you lower your blood pressure. · If your doctor recommends it, get more exercise. Walking is a good choice. Bit by bit, increase the amount you walk every day. Try for at least 30 minutes on most days of the week. You also may want to swim, bike, or do other activities. · Avoid or limit alcohol. Talk to your doctor about whether you can drink any alcohol. · Try to limit how much sodium you eat to less than 2,300 milligrams (mg) a day. Your doctor may ask you to try to eat less than 1,500 mg a day.   · Eat plenty of fruits (such as bananas and oranges), vegetables, legumes, whole grains, and low-fat dairy products. · Lower the amount of saturated fat in your diet. Saturated fat is found in animal products such as milk, cheese, and meat. Limiting these foods may help you lose weight and also lower your risk for heart disease. · Do not smoke. Smoking increases your risk for heart attack and stroke. If you need help quitting, talk to your doctor about stop-smoking programs and medicines. These can increase your chances of quitting for good. When should you call for help? Call  911 anytime you think you may need emergency care. This may mean having symptoms that suggest that your blood pressure is causing a serious heart or blood vessel problem. Your blood pressure may be over 180/120.   For example, call  911 if:    · You have symptoms of a heart attack. These may include:  ? Chest pain or pressure, or a strange feeling in the chest.  ? Sweating. ? Shortness of breath. ? Nausea or vomiting. ? Pain, pressure, or a strange feeling in the back, neck, jaw, or upper belly or in one or both shoulders or arms. ? Lightheadedness or sudden weakness. ? A fast or irregular heartbeat.     · You have symptoms of a stroke. These may include:  ? Sudden numbness, tingling, weakness, or loss of movement in your face, arm, or leg, especially on only one side of your body. ? Sudden vision changes. ? Sudden trouble speaking. ? Sudden confusion or trouble understanding simple statements. ? Sudden problems with walking or balance. ? A sudden, severe headache that is different from past headaches.     · You have severe back or belly pain.    Do not wait until your blood pressure comes down on its own.  Get help right away.   Call your doctor now or seek immediate care if:    · Your blood pressure is much higher than normal (such as 180/120 or higher), but you don't have symptoms.     · You think high blood pressure is causing symptoms, such as:  ? Severe headache.  ? Blurry vision.    Watch closely for changes in your health, and be sure to contact your doctor if:    · Your blood pressure measures higher than your doctor recommends at least 2 times. That means the top number is higher or the bottom number is higher, or both.     · You think you may be having side effects from your blood pressure medicine. Where can you learn more? Go to http://alphonse-xochitl.info/. Enter E285 in the search box to learn more about \"High Blood Pressure: Care Instructions. \"  Current as of: April 9, 2019  Content Version: 12.2  © 2556-2834 [employer]. Care instructions adapted under license by [employer] (which disclaims liability or warranty for this information). If you have questions about a medical condition or this instruction, always ask your healthcare professional. [address] any warranty or liability for your use of this information. Please keep your follow-up appointment with Fani Conde MD.         Health Maintenance Due   Topic Date Due    Pneumococcal 0-64 years (1 of 1 - PPSV23) 04/22/1966    DTaP/Tdap/Td series (1 - Tdap) 04/22/1971    Shingrix Vaccine Age 50> (1 of 2) 04/22/2010    BREAST CANCER SCRN MAMMOGRAM  04/03/2011       I have discussed the diagnosis with the patient and the intended plan as seen in the above orders. Patient is in agreement. The patient has received an after-visit summary and questions were answered concerning future plans. I have discussed medication side effects and warnings with the patient as well. Warning signs for the above conditions were discussed including when to call our office or go to the emergency room. The nurse provided the patient and/or family with advanced directive information if needed and encouraged the patient to provide a copy to the office when available.

## 2020-01-20 NOTE — PATIENT INSTRUCTIONS
Please contact our office if your symptoms worsen or do not improve. Please call 911 or go directly to the Emergency Department if you develop shortness of breath, chest pain, difficulty breathing or worsening of your symptoms. High Blood Pressure: Care Instructions Overview It's normal for blood pressure to go up and down throughout the day. But if it stays up, you have high blood pressure. Another name for high blood pressure is hypertension. Despite what a lot of people think, high blood pressure usually doesn't cause headaches or make you feel dizzy or lightheaded. It usually has no symptoms. But it does increase your risk of stroke, heart attack, and other problems. You and your doctor will talk about your risks of these problems based on your blood pressure. Your doctor will give you a goal for your blood pressure. Your goal will be based on your health and your age. Lifestyle changes, such as eating healthy and being active, are always important to help lower blood pressure. You might also take medicine to reach your blood pressure goal. 
Follow-up care is a key part of your treatment and safety. Be sure to make and go to all appointments, and call your doctor if you are having problems. It's also a good idea to know your test results and keep a list of the medicines you take. How can you care for yourself at home? Medical treatment · If you stop taking your medicine, your blood pressure will go back up. You may take one or more types of medicine to lower your blood pressure. Be safe with medicines. Take your medicine exactly as prescribed. Call your doctor if you think you are having a problem with your medicine. · Talk to your doctor before you start taking aspirin every day. Aspirin can help certain people lower their risk of a heart attack or stroke. But taking aspirin isn't right for everyone, because it can cause serious bleeding. · See your doctor regularly. You may need to see the doctor more often at first or until your blood pressure comes down. · If you are taking blood pressure medicine, talk to your doctor before you take decongestants or anti-inflammatory medicine, such as ibuprofen. Some of these medicines can raise blood pressure. · Learn how to check your blood pressure at home. Lifestyle changes · Stay at a healthy weight. This is especially important if you put on weight around the waist. Losing even 10 pounds can help you lower your blood pressure. · If your doctor recommends it, get more exercise. Walking is a good choice. Bit by bit, increase the amount you walk every day. Try for at least 30 minutes on most days of the week. You also may want to swim, bike, or do other activities. · Avoid or limit alcohol. Talk to your doctor about whether you can drink any alcohol. · Try to limit how much sodium you eat to less than 2,300 milligrams (mg) a day. Your doctor may ask you to try to eat less than 1,500 mg a day. · Eat plenty of fruits (such as bananas and oranges), vegetables, legumes, whole grains, and low-fat dairy products. · Lower the amount of saturated fat in your diet. Saturated fat is found in animal products such as milk, cheese, and meat. Limiting these foods may help you lose weight and also lower your risk for heart disease. · Do not smoke. Smoking increases your risk for heart attack and stroke. If you need help quitting, talk to your doctor about stop-smoking programs and medicines. These can increase your chances of quitting for good. When should you call for help? Call  911 anytime you think you may need emergency care. This may mean having symptoms that suggest that your blood pressure is causing a serious heart or blood vessel problem. Your blood pressure may be over 180/120. 
 For example, call  911 if: 
  · You have symptoms of a heart attack. These may include: ? Chest pain or pressure, or a strange feeling in the chest. 
? Sweating. ? Shortness of breath. ? Nausea or vomiting. ? Pain, pressure, or a strange feeling in the back, neck, jaw, or upper belly or in one or both shoulders or arms. ? Lightheadedness or sudden weakness. ? A fast or irregular heartbeat.  
  · You have symptoms of a stroke. These may include: 
? Sudden numbness, tingling, weakness, or loss of movement in your face, arm, or leg, especially on only one side of your body. ? Sudden vision changes. ? Sudden trouble speaking. ? Sudden confusion or trouble understanding simple statements. ? Sudden problems with walking or balance. ? A sudden, severe headache that is different from past headaches.  
  · You have severe back or belly pain.  
 Do not wait until your blood pressure comes down on its own. Get help right away. 
 Call your doctor now or seek immediate care if: 
  · Your blood pressure is much higher than normal (such as 180/120 or higher), but you don't have symptoms.  
  · You think high blood pressure is causing symptoms, such as: 
? Severe headache. 
? Blurry vision.  
 Watch closely for changes in your health, and be sure to contact your doctor if: 
  · Your blood pressure measures higher than your doctor recommends at least 2 times. That means the top number is higher or the bottom number is higher, or both.  
  · You think you may be having side effects from your blood pressure medicine. Where can you learn more? Go to http://alphonse-xochitl.info/. Enter E656 in the search box to learn more about \"High Blood Pressure: Care Instructions. \" Current as of: April 9, 2019 Content Version: 12.2 © 1396-3515 [employer]. Care instructions adapted under license by [employer] (which disclaims liability or warranty for this information).  If you have questions about a medical condition or this instruction, always ask your healthcare professional. [place] [address] any warranty or liability for your use of this information.

## 2020-01-20 NOTE — TELEPHONE ENCOUNTER
Pt's BP went very high this weekend. She was seen at the ER at [facility] and was told to contact her PCP today to discuss medication changes or whether she needs a visit. Pt stated that her bp has been fluctuating heavily  (highest was 220/123). Please return patient's call at [phone].

## 2020-01-20 NOTE — PROGRESS NOTES
Cleo Bowie  Identified pt with two pt identifiers(name and ). Chief Complaint   Patient presents with    Hypertension     Room 4B //        Reviewed record In preparation for visit and have obtained necessary documentation. 1. Have you been to the ER, urgent care clinic or hospitalized since your last visit? [address] [place] [address] [address] - Saturday     2. Have you seen or consulted any other health care providers outside of the [address] [place] [address] [place] since your last visit? Include any pap smears or colon screening. Saw psychiatrist recently and seeing her again in March. Had mammogram in 2019 at [facility] reviewed with provider.     Health Maintenance reviewed:     Health Maintenance Due   Topic    Pneumococcal 0-64 years (1 of 1 - PPSV23)    DTaP/Tdap/Td series (1 - Tdap)    Shingrix Vaccine Age 49> (1 of 2)    BREAST CANCER SCRN MAMMOGRAM           Wt Readings from Last 3 Encounters:   20 209 lb 12.8 oz (95.2 kg)   20 207 lb 10.8 oz (94.2 kg)   10/03/19 206 lb (93.4 kg)        Temp Readings from Last 3 Encounters:   20 98 °F (36.7 °C) (Oral)   20 98.7 °F (37.1 °C)   10/03/19 98.7 °F (37.1 °C) (Oral)        BP Readings from Last 3 Encounters:   20 (!) 162/112   20 158/78   10/03/19 139/85        Pulse Readings from Last 3 Encounters:   20 67   20 70   10/03/19 67        Vitals:    20 1528   BP: (!) 162/112   Pulse: 67   Resp: 16   Temp: 98 °F (36.7 °C)   TempSrc: Oral   SpO2: 97%   Weight: 209 lb 12.8 oz (95.2 kg)   Height: 5' 4\" (1.626 m)   PainSc:   0 - No pain          Learning Assessment:   :       Learning Assessment 2017   PRIMARY LEARNER Patient   PRIMARY LANGUAGE ENGLISH   LEARNER PREFERENCE PRIMARY DEMONSTRATION   ANSWERED BY patient   RELATIONSHIP SELF        Depression Screening:   :       3 most recent PHQ Screens 2019   Little interest or pleasure in doing things Nearly every day   Feeling down, depressed, irritable, or hopeless Nearly every day   Total Score PHQ 2 6   Trouble falling or staying asleep, or sleeping too much Nearly every day   Feeling tired or having little energy Nearly every day   Poor appetite, weight loss, or overeating Nearly every day   Feeling bad about yourself - or that you are a failure or have let yourself or your family down Not at all   Trouble concentrating on things such as school, work, reading, or watching TV Nearly every day   Moving or speaking so slowly that other people could have noticed; or the opposite being so fidgety that others notice Nearly every day   Thoughts of being better off dead, or hurting yourself in some way Not at all   PHQ 9 Score 21   How difficult have these problems made it for you to do your work, take care of your home and get along with others Extremely difficult        Fall Risk Assessment:   :     No flowsheet data found. Abuse Screening:   :     No flowsheet data found.      ADL Screening:   :       ADL Assessment 6/7/2019   Feeding yourself No Help Needed   Getting from bed to chair No Help Needed   Getting dressed No Help Needed   Bathing or showering No Help Needed   Walk across the room (includes cane/walker) No Help Needed   Using the telphone No Help Needed   Taking your medications No Help Needed   Preparing meals No Help Needed   Managing money (expenses/bills) No Help Needed   Moderately strenuous housework (laundry) No Help Needed   Shopping for personal items (toiletries/medicines) No Help Needed   Shopping for groceries No Help Needed   Driving No Help Needed   Climbing a flight of stairs No Help Needed   Getting to places beyond walking distances No Help Needed

## 2020-01-27 ENCOUNTER — OFFICE VISIT (OUTPATIENT)
Dept: INTERNAL MEDICINE CLINIC | Facility: CLINIC | Age: 60
End: 2020-01-27

## 2020-01-27 ENCOUNTER — TELEPHONE (OUTPATIENT)
Dept: INTERNAL MEDICINE CLINIC | Facility: CLINIC | Age: 60
End: 2020-01-27

## 2020-01-27 VITALS
OXYGEN SATURATION: 98 % | WEIGHT: 211 LBS | BODY MASS INDEX: 36.02 KG/M2 | SYSTOLIC BLOOD PRESSURE: 145 MMHG | HEIGHT: 64 IN | RESPIRATION RATE: 16 BRPM | DIASTOLIC BLOOD PRESSURE: 84 MMHG | TEMPERATURE: 98.1 F | HEART RATE: 75 BPM

## 2020-01-27 DIAGNOSIS — I10 ESSENTIAL HYPERTENSION: Primary | ICD-10-CM

## 2020-01-27 DIAGNOSIS — E66.01 SEVERE OBESITY (HCC): ICD-10-CM

## 2020-01-27 NOTE — TELEPHONE ENCOUNTER
Pt has an appt this Friday, but is concerned over continuous high bp, averaging 190/100. Please give pt a call back at [phone] to discuss whether she should come in sooner.

## 2020-01-27 NOTE — PROGRESS NOTES
CC:   Chief Complaint   Patient presents with    Blood Pressure Check     room 3 A       HISTORY OF PRESENT ILLNESS  Giselle Gilbert is a 61 y.o. female. Presents for BP evaluation. At last clinic visit on 1/20/20 BP was 170/98. Losartan dose was increased from 50 to 100 mg daily. Continued also on diltiazem  mg and HCTZ 12.5 mg daily. Reports her BP was 199/90 this morning and 215/100 two days ago using her home wrist BP monitor. Has headaches and mild nausea. Denies CP, SOB, or leg swelling. Increased anxiety lately. She has HTN, CKD stage 3, hypothyroidism, hyperlipidemia, major depression, anxiety disorder with panic attacks, GERD, history of gastric bypass in 2013 with loss of 145 lbs, history of ischemic colitis in 2009, and family history of colon polyps and kidney disease requiring hemodialysis.       Other Providers: Dr. Mildred Hi ([facility]), Therapist: Royer Hernandez, [facility], [place]      Patient Active Problem List   Diagnosis Code    Hypertension I10    LVH (left ventricular hypertrophy) due to hypertensive disease I11.9    Venous stasis I87.8    Severe episode of recurrent major depressive disorder, without psychotic features ([facility] [address] F33.2    Panic disorder F41.0    Acquired hypothyroidism E03.9    Vitamin D deficiency E55.9    Chronic insomnia F51.04    Gastroesophageal reflux disease without esophagitis K21.9    History of gastric bypass Z98.84    Tobacco use Z72.0    Obesity (BMI 30-39. 9) E66.9    IGT (impaired glucose tolerance) R73.02    Stage 3 chronic kidney disease (HCC) N18.3    Prediabetes R73.03    Mixed hyperlipidemia E78.2    Chronic pain of both knees M25.561, M25.562, G89.29    Neck pain M54.2    OCD (obsessive compulsive disorder) F42.9    PTSD (post-traumatic stress disorder) F43.10    Severe obesity (HCC) E66.01     Past Medical History:   Diagnosis Date    Anxiety disorder     Arthritis     knee arthritis    Depression     Fatigue     GERD (gastroesophageal reflux disease)     Headache     Heart disease     Hiatal hernia     Hypertension     Ischemic colon ([facility] [address] 2009    Kidney disease     Leg swelling     LVH (left ventricular hypertrophy) due to hypertensive disease     dr Garland Barker   [facility] Morbid obesity ([facility] [address]     Postmenopausal     Short of breath on exertion     Thyroid disease     TIA (transient ischemic attack)     Venous stasis     Vertigo      Allergies   Allergen Reactions    Lisinopril Cough    Lortab [Hydrocodone-Acetaminophen] Itching    Morphine Nausea and Vomiting       Current Outpatient Medications   Medication Sig Dispense Refill    FLUoxetine (PROZAC) 20 mg capsule       famotidine (PEPCID) 40 mg tablet Take 1 Tab by mouth daily. Replaces Zantac. 90 Tab 3    cholecalciferol (VITAMIN D3) 5,000 unit capsule Take 1 Cap by mouth daily. Indications: low vitamin D levels 30 Cap 5    buPROPion XL (WELLBUTRIN XL) 300 mg XL tablet Take 1 Tab by mouth every morning. 90 Tab 3    QUEtiapine (SEROQUEL) 50 mg tablet Take 2 Tabs by mouth nightly. 60 Tab 5    DULoxetine (CYMBALTA) 30 mg capsule Take 4 Caps by mouth daily. (Patient taking differently: Take 30 mg by mouth daily.) 120 Cap 5    hydroCHLOROthiazide (HYDRODIURIL) 12.5 mg tablet Take 1 Tab by mouth daily. Indications: high blood pressure 90 Tab 1    dilTIAZem (TAZTIA XT) 360 mg SR capsule TAKE ONE CAPSULE BY MOUTH EVERY DAY 90 Cap 3    alpha-D-galactosidase (BEANO PO) Take  by mouth.  simethicone (GAS-X PO) Take  by mouth.  LORazepam (ATIVAN) 0.5 mg tablet TAKE ONE BY MOUTH DAILY AS NEEDED FOR ANXIETY 30 Tab 0    levothyroxine (SYNTHROID) 100 mcg tablet Take 1 Tab by mouth Daily (before breakfast). 30 Tab 1    losartan (COZAAR) 100 mg tablet Take 1 Tab by mouth daily.  Indications: high blood pressure 90 Tab 0         PHYSICAL EXAM  Visit Vitals  /84   Pulse 75   Temp 98.1 °F (36.7 °C) (Oral)   Resp 16   Ht 5' 4\" (1.626 m)   Wt 211 lb (95.7 kg)   SpO2 98%   BMI 36.22 kg/m²       General: Obese, no distress. HEENT:  Head normocephalic/atraumatic, no scleral icterus  Lungs:  Clear to ausculation bilaterally. Good air movement. Heart:  Regular rate and rhythm, normal S1 and S2, no murmur, gallop, or rub  Extremities: No clubbing, cyanosis, or edema. Neurological: Alert and oriented. Psychiatric: Normal mood and affect. Behavior is normal.     Results for orders placed or performed during the hospital encounter of 01/18/20   EKG, 12 LEAD, INITIAL   Result Value Ref Range    Ventricular Rate 80 BPM    Atrial Rate 80 BPM    P-R Interval 160 ms    QRS Duration 92 ms    Q-T Interval 400 ms    QTC Calculation (Bezet) 461 ms    Calculated P Axis 64 degrees    Calculated R Axis 20 degrees    Calculated T Axis 56 degrees    Diagnosis       Normal sinus rhythm  Possible Left atrial enlargement  When compared with ECG of 16-JAN-2017 16:06,  No significant change was found  Confirmed by Kamryn Khan MD, Federico Magdaleno ([id number]) on 1/19/2020 12:45:49 PM           ASSESSMENT AND PLAN    ICD-10-CM ICD-9-CM    1. Essential hypertension I10 401.9    2. Severe obesity ([facility] [address] E66.01 278.01      Diagnoses and all orders for this visit:    1. Essential hypertension  /84 today. Higher BP readings at home using wrist BP monitor. Improving with increased dose of losartan at 100 mg daily, diltiazem  mg and HCTZ 12.5 mg daily. Patient Instructions  1. Continue checking home BP readings. Write then down and bring to your next clinic visit. 2.  Bring your blood measure monitor with you to your next clinic visit. 2. Severe obesity ([address]  Counseled on diet, exercise, and weight loss. Follow-up and Dispositions    · Return if symptoms worsen or fail to improve, for Scheduled appointment on 1/31/20. I have discussed the diagnosis with the patient and the intended plan as seen in the above orders. Patient is in agreement.   The patient has received an after-visit summary and questions were answered concerning future plans. I have discussed medication side effects and warnings with the patient as well.

## 2020-01-27 NOTE — PATIENT INSTRUCTIONS
Patient Instructions 1. Continue checking home BP readings. Write then down and bring to your next clinic visit. 2.  Bring your blood measure monitor with you to your next clinic visit.

## 2020-01-27 NOTE — PROGRESS NOTES
Cleo Bowie  Identified pt with two pt identifiers(name and ). Chief Complaint   Patient presents with    Blood Pressure Check   declines vaccines today    1. Have you been to the ER, urgent care clinic since your last visit? Hospitalized since your last visit? [facility]    2. Have you seen or consulted any other health care providers outside of the [address] [place] [address] since your last visit? Include any pap smears or colon screening. no    Today's provider has been notified of reason for visit, vitals and flowsheets obtained on patients.      Patient received paperwork for advance directive during previous visit but has not completed at this time     Reviewed record In preparation for visit, huddled with provider and have obtained necessary documentation      Health Maintenance Due   Topic    Pneumococcal 0-64 years (1 of 1 - PPSV23)    DTaP/Tdap/Td series (1 - Tdap)    Shingrix Vaccine Age 49> (1 of 2)    BREAST CANCER SCRN MAMMOGRAM        Wt Readings from Last 3 Encounters:   20 211 lb (95.7 kg)   20 209 lb 12.8 oz (95.2 kg)   20 207 lb 10.8 oz (94.2 kg)     Temp Readings from Last 3 Encounters:   20 98.1 °F (36.7 °C) (Oral)   20 98 °F (36.7 °C) (Oral)   20 98.7 °F (37.1 °C)     BP Readings from Last 3 Encounters:   20 168/80   20 (!) 170/98   20 158/78     Pulse Readings from Last 3 Encounters:   20 75   20 67   20 70     Vitals:    20 1045   BP: 168/80   Pulse: 75   Resp: 16   Temp: 98.1 °F (36.7 °C)   TempSrc: Oral   SpO2: 98%   Weight: 211 lb (95.7 kg)   Height: 5' 4\" (1.626 m)   PainSc:   5   PainLoc: Head         Learning Assessment:  :     Learning Assessment 2017   PRIMARY LEARNER Patient   PRIMARY LANGUAGE ENGLISH   LEARNER PREFERENCE PRIMARY DEMONSTRATION   ANSWERED BY patient   RELATIONSHIP SELF       Depression Screening:  :     3 most recent PHQ Screens 2019   Little interest or pleasure in doing things Nearly every day   Feeling down, depressed, irritable, or hopeless Nearly every day   Total Score PHQ 2 6   Trouble falling or staying asleep, or sleeping too much Nearly every day   Feeling tired or having little energy Nearly every day   Poor appetite, weight loss, or overeating Nearly every day   Feeling bad about yourself - or that you are a failure or have let yourself or your family down Not at all   Trouble concentrating on things such as school, work, reading, or watching TV Nearly every day   Moving or speaking so slowly that other people could have noticed; or the opposite being so fidgety that others notice Nearly every day   Thoughts of being better off dead, or hurting yourself in some way Not at all   PHQ 9 Score 21   How difficult have these problems made it for you to do your work, take care of your home and get along with others Extremely difficult       Fall Risk Assessment:  :     No flowsheet data found. Abuse Screening:  :     No flowsheet data found. ADL Screening:  :     ADL Assessment 6/7/2019   Feeding yourself No Help Needed   Getting from bed to chair No Help Needed   Getting dressed No Help Needed   Bathing or showering No Help Needed   Walk across the room (includes cane/walker) No Help Needed   Using the telphone No Help Needed   Taking your medications No Help Needed   Preparing meals No Help Needed   Managing money (expenses/bills) No Help Needed   Moderately strenuous housework (laundry) No Help Needed   Shopping for personal items (toiletries/medicines) No Help Needed   Shopping for groceries No Help Needed   Driving No Help Needed   Climbing a flight of stairs No Help Needed   Getting to places beyond walking distances No Help Needed                 Medication reconciliation up to date and corrected with patient at this time.

## 2020-01-31 ENCOUNTER — OFFICE VISIT (OUTPATIENT)
Dept: INTERNAL MEDICINE CLINIC | Facility: CLINIC | Age: 60
End: 2020-01-31

## 2020-01-31 VITALS
OXYGEN SATURATION: 96 % | SYSTOLIC BLOOD PRESSURE: 148 MMHG | HEART RATE: 63 BPM | BODY MASS INDEX: 36.02 KG/M2 | DIASTOLIC BLOOD PRESSURE: 95 MMHG | TEMPERATURE: 98.4 F | WEIGHT: 211 LBS | RESPIRATION RATE: 16 BRPM | HEIGHT: 64 IN

## 2020-01-31 DIAGNOSIS — N18.30 STAGE 3 CHRONIC KIDNEY DISEASE (HCC): ICD-10-CM

## 2020-01-31 DIAGNOSIS — I10 ESSENTIAL HYPERTENSION: Primary | ICD-10-CM

## 2020-01-31 DIAGNOSIS — Z23 ENCOUNTER FOR IMMUNIZATION: ICD-10-CM

## 2020-01-31 RX ORDER — HYDROCHLOROTHIAZIDE 25 MG/1
25 TABLET ORAL DAILY
Qty: 90 TAB | Refills: 1 | Status: SHIPPED | OUTPATIENT
Start: 2020-01-31 | End: 2022-01-11

## 2020-01-31 NOTE — PATIENT INSTRUCTIONS
Vaccine Information Statement Pneumococcal Polysaccharide Vaccine (PPSV23): What You Need to Know Many Vaccine Information Statements are available in [place] and other languages. See www.immunize.org/vis Hojas de información sobre vacunas están disponibles en español y en muchos otros idiomas. Visite www.immunize.org/vis 1. Why get vaccinated? Pneumococcal polysaccharide vaccine (PPSV23) can prevent pneumococcal disease. Pneumococcal disease refers to any illness caused by pneumococcal bacteria. These bacteria can cause many types of illnesses, including pneumonia, which is an infection of the lungs. Pneumococcal bacteria are one of the most common causes of pneumonia. Besides pneumonia, pneumococcal bacteria can also cause: 
 Ear infections  Sinus infections  Meningitis (infection of the tissue covering the brain and spinal cord)  Bacteremia (bloodstream infection) Anyone can get pneumococcal disease, but children under 3years of age, people with certain medical conditions, adults 72 years or older, and cigarette smokers are at the highest risk. Most pneumococcal infections are mild. However, some can result in long-term problems, such as brain damage or hearing loss. Meningitis, bacteremia, and pneumonia caused by pneumococcal disease can be fatal.  
 
2. PPSV23  
 
PPSV23 protects against 23 types of bacteria that cause pneumococcal disease. PPSV23 is recommended for:  All adults 72 years or older,  Anyone 2 years or older with certain medical conditions that can lead to an increased risk for pneumococcal disease. Most people need only one dose of PPSV23. A second dose of PPSV23, and another type of pneumococcal vaccine called PCV13, are recommended for certain high-risk groups. Your health care provider can give you more information.  
 
People 65 years or older should get a dose of PPSV23 even if they have already gotten one or more doses of the vaccine before they turned 65. 
 
3. Talk with your health care provider Tell your vaccine provider if the person getting the vaccine: 
 Has had an allergic reaction after a previous dose of PPSV23, or has any severe, life-threatening allergies. In some cases, your health care provider may decide to postpone PPSV23 vaccination to a future visit. People with minor illnesses, such as a cold, may be vaccinated. People who are moderately or severely ill should usually wait until they recover before getting PPSV23. Your health care provider can give you more information. 4. Risks of a vaccine reaction  Redness or pain where the shot is given, feeling tired, fever, or muscle aches can happen after PPSV23. People sometimes faint after medical procedures, including vaccination. Tell your provider if you feel dizzy or have vision changes or ringing in the ears. As with any medicine, there is a very remote chance of a vaccine causing a severe allergic reaction, other serious injury, or death. 5. What if there is a serious problem? An allergic reaction could occur after the vaccinated person leaves the clinic. If you see signs of a severe allergic reaction (hives, swelling of the face and throat, difficulty breathing, a fast heartbeat, dizziness, or weakness), call 9-1-1 and get the person to the nearest hospital. 
 
For other signs that concern you, call your health care provider. Adverse reactions should be reported to the Vaccine Adverse Event Reporting System (VAERS). Your health care provider will usually file this report, or you can do it yourself. Visit the VAERS website at www.vaers. hhs.gov or call 9-197.161.6310. VAERS is only for reporting reactions, and VAERS staff do not give medical advice. 6. How can I learn more?  Ask your health care provider.  Call your local or state health department.  Contact the Centers for Disease Control and Prevention (CDC): 
- Call 7-523.678.4732 (1-800-CDC-INFO) or 
- Visit CDCs website at www.cdc.gov/vaccines Vaccine Information Statement PPSV23  
10/30/2019 Department of [facility] and [employer] Centers for Disease Control and Prevention Office Use Only Vaccine Information Statement Tdap (Tetanus, Diphtheria, Pertussis) Vaccine: What You Need to Know Many Vaccine Information Statements are available in [place]ian and other languages. See www.immunize.org/vis. Hojas de Información Sobre Vacunas están disponibles en español y en muchos otros idiomas. Visite [place].si 1. Why get vaccinated? Tetanus, diphtheria, and pertussis are very serious diseases. Tdap vaccine can protect us from these diseases. And, Tdap vaccine given to pregnant women can protect  babies against pertussis. TETANUS (Lockjaw) is rare in the [facility] today. It causes painful muscle tightening and stiffness, usually all over the body. ? It can lead to tightening of muscles in the head and neck so you cant open your mouth, swallow, or sometimes even breathe. Tetanus kills about 1 out of 10 people who are infected even after receiving the best medical care. DIPHTHERIA is also rare in the [facility] today. It can cause a thick coating to form in the back of the throat. ? It can lead to breathing problems, heart failure, paralysis, and death. PERTUSSIS (Whooping Cough) causes severe coughing spells, which can cause difficulty breathing, vomiting, and disturbed sleep. ? It can also lead to weight loss, incontinence, and rib fractures. Up to 2 in 100 adolescents and 5 in 100 adults with pertussis are hospitalized or have complications, which could include pneumonia or death. These diseases are caused by bacteria. Diphtheria and pertussis are spread from person to person through secretions from coughing or sneezing. Tetanus enters the body through cuts, scratches, or wounds. Before vaccines, as many as 200,000 cases of diphtheria, 200,000 cases of pertussis, and hundreds of cases of tetanus, were reported in the [place] each year. Since vaccination began, reports of cases for tetanus and diphtheria have dropped by about 99% and for pertussis by about 80%. 2. Tdap vaccine Tdap vaccine can protect adolescents and adults from tetanus, diphtheria, and pertussis. One dose of Tdap is routinely given at age 6 or 15. People who did not get Tdap at that age should get it as soon as possible. Tdap is especially important for health care professionals and anyone having close contact with a baby younger than 12 months. Pregnant women should get a dose of Tdap during every pregnancy, to protect the  from pertussis. Infants are most at risk for severe, life-threatening complications from pertussis. Another vaccine, called Td, protects against tetanus and diphtheria, but not pertussis. A Td booster should be given every 10 years. Tdap may be given as one of these boosters if you have never gotten Tdap before. Tdap may also be given after a severe cut or burn to prevent tetanus infection. Your doctor or the person giving you the vaccine can give you more information. Tdap may safely be given at the same time as other vaccines. 3. Some people should not get this vaccine  A person who has ever had a life-threatening allergic reaction after a previous dose of any diphtheria, tetanus or pertussis containing vaccine, OR has a severe allergy to any part of this vaccine, should not get Tdap vaccine. Tell the person giving the vaccine about any severe allergies.  Anyone who had coma or long repeated seizures within 7 days after a childhood dose of DTP or DTaP, or a previous dose of Tdap, should not get Tdap, unless a cause other than the vaccine was found. They can still get Td.  Talk to your doctor if you: 
- have seizures or another nervous system problem, 
- had severe pain or swelling after any vaccine containing diphtheria, tetanus or pertussis,  
- ever had a condition called Guillain Barré Syndrome (GBS), 
- arent feeling well on the day the shot is scheduled. 4. Risks With any medicine, including vaccines, there is a chance of side effects. These are usually mild and go away on their own. Serious reactions are also possible but are rare. Most people who get Tdap vaccine do not have any problems with it. Mild Problems following Tdap 
(Did not interfere with activities)  Pain where the shot was given (about 3 in 4 adolescents or 2 in 3 adults)  Redness or swelling where the shot was given (about 1 person in 5)  Mild fever of at least 100.4°F (up to about 1 in 25 adolescents or 1 in 100 adults)  Headache (about 3 or 4 people in 10)  Tiredness (about 1 person in 3 or 4)  Nausea, vomiting, diarrhea, stomach ache (up to 1 in 4 adolescents or 1 in 10 adults)  Chills,  sore joints (about 1 person in 10)  Body aches (about 1 person in 3 or 4)  Rash, swollen glands (uncommon) Moderate Problems following Tdap (Interfered with activities, but did not require medical attention)  Pain where the shot was given (up to 1 in 5 or 6)  Redness or swelling where the shot was given (up to about 1 in 16 adolescents or 1 in 12 adults)  Fever over 102°F (about 1 in 100 adolescents or 1 in 250 adults)  Headache (about 1 in 7 adolescents or 1 in 10 adults)  Nausea, vomiting, diarrhea, stomach ache (up to 1 or 3 people in 100)  Swelling of the entire arm where the shot was given (up to about 1 in 500). Severe Problems following Tdap 
(Unable to perform usual activities; required medical attention)  Swelling, severe pain, bleeding, and redness in the arm where the shot was given (rare). Problems that could happen after any vaccine:  People sometimes faint after a medical procedure, including vaccination. Sitting or lying down for about 15 minutes can help prevent fainting, and injuries caused by a fall. Tell your doctor if you feel dizzy, or have vision changes or ringing in the ears.  Some people get severe pain in the shoulder and have difficulty moving the arm where a shot was given. This happens very rarely.  Any medication can cause a severe allergic reaction. Such reactions from a vaccine are very rare, estimated at fewer than 1 in a million doses, and would happen within a few minutes to a few hours after the vaccination. As with any medicine, there is a very remote chance of a vaccine causing a serious injury or death. The safety of vaccines is always being monitored. For more information, visit: www.cdc.gov/vaccinesafety/ 
 
5. What if there is a serious problem? What should I look for?  Look for anything that concerns you, such as signs of a severe allergic reaction, very high fever, or unusual behavior.  Signs of a severe allergic reaction can include hives, swelling of the face and throat, difficulty breathing, a fast heartbeat, dizziness, and weakness. These would usually start a few minutes to a few hours after the vaccination. What should I do?  If you think it is a severe allergic reaction or other emergency that cant wait, call 9-1-1 or get the person to the nearest hospital. Otherwise, call your doctor.  Afterward, the reaction should be reported to the Vaccine Adverse Event Reporting System (VAERS). Your doctor might file this report, or you can do it yourself through the VAERS web site at www.vaers. hhs.gov, or by calling 1-741.411.8127. VAERS does not give medical advice.  
 
6. The National Vaccine Injury Compensation Program 
 
The National Vaccine Injury Compensation Program (VICP) is a federal program that was created to compensate people who may have been injured by certain vaccines. Persons who believe they may have been injured by a vaccine can learn about the program and about filing a claim by calling 6-965.573.3588 or visiting the [employer] website at www.[facility].gov/vaccinecompensation. There is a time limit to file a claim for compensation. 7. How can I learn more?  Ask your doctor. He or she can give you the vaccine package insert or suggest other sources of information.  Call your local or state health department.  Contact the Centers for Disease Control and Prevention (CDC): 
- Call 4-967.109.9063 (1-800-CDC-INFO) or 
- Visit CDCs website at www.cdc.gov/vaccines Vaccine Information Statement Tdap Vaccine 
(2/24/2015) [address] [id number] Department of Health and [employer] Centers for Disease Control and Prevention Office Use Only

## 2020-01-31 NOTE — PROGRESS NOTES
CC:   Chief Complaint   Patient presents with    Hypertension    Immunization/Injection       HISTORY OF PRESENT ILLNESS  Estela Chamberlain is a 61 y.o. female. Accompanied by her     Presents for HTN follow up. Seen by Vance Bowman NP on 1/20/20. /98. Losartan dose increased from 50 to 100 mg. Today she reports compliance with losartan 100 mg daily, HCTZ 12. 5 mg, and diltiazem  mg daily. Brought in her wrist monitor and find out it was giving much lower readings compared to the clinic monitor. Plans to buy an arm BP monitor. Has occasional headaches. Denies CP, SOB, or heart palpitations. Willing to get PPSV-23 and Tdap today. Had mammogram at [facility] in [place] last year in the fall. Patient Active Problem List   Diagnosis Code    Hypertension I10    LVH (left ventricular hypertrophy) due to hypertensive disease I11.9    Venous stasis I87.8    Severe episode of recurrent major depressive disorder, without psychotic features (HCC) F33.2    Panic disorder F41.0    Acquired hypothyroidism E03.9    Vitamin D deficiency E55.9    Chronic insomnia F51.04    Gastroesophageal reflux disease without esophagitis K21.9    History of gastric bypass Z98.84    Tobacco use Z72.0    Obesity (BMI 30-39. 9) E66.9    IGT (impaired glucose tolerance) R73.02    Stage 3 chronic kidney disease (HCC) N18.3    Prediabetes R73.03    Mixed hyperlipidemia E78.2    Chronic pain of both knees M25.561, M25.562, G89.29    Neck pain M54.2    OCD (obsessive compulsive disorder) F42.9    PTSD (post-traumatic stress disorder) F43.10    Severe obesity (HCC) E66.01     Past Medical History:   Diagnosis Date    Anxiety disorder     Arthritis     knee arthritis    Depression     Fatigue     GERD (gastroesophageal reflux disease)     Headache     Heart disease     Hiatal hernia     Hypertension     Ischemic colon ([address] 2009    Kidney disease     Leg swelling     LVH (left ventricular hypertrophy) due to hypertensive disease     dr [place] Morbid obesity ([address]     Postmenopausal     Short of breath on exertion     Thyroid disease     TIA (transient ischemic attack)     Venous stasis     Vertigo      Allergies   Allergen Reactions    Lisinopril Cough    Lortab [Hydrocodone-Acetaminophen] Itching    Morphine Nausea and Vomiting       Current Outpatient Medications   Medication Sig Dispense Refill    FLUoxetine (PROZAC) 20 mg capsule       losartan (COZAAR) 100 mg tablet Take 1 Tab by mouth daily. Indications: high blood pressure 90 Tab 0    famotidine (PEPCID) 40 mg tablet Take 1 Tab by mouth daily. Replaces Zantac. 90 Tab 3    cholecalciferol (VITAMIN D3) 5,000 unit capsule Take 1 Cap by mouth daily. Indications: low vitamin D levels 30 Cap 5    buPROPion XL (WELLBUTRIN XL) 300 mg XL tablet Take 1 Tab by mouth every morning. 90 Tab 3    QUEtiapine (SEROQUEL) 50 mg tablet Take 2 Tabs by mouth nightly. 60 Tab 5    DULoxetine (CYMBALTA) 30 mg capsule Take 4 Caps by mouth daily. (Patient taking differently: Take 30 mg by mouth daily.) 120 Cap 5    hydroCHLOROthiazide (HYDRODIURIL) 12.5 mg tablet Take 1 Tab by mouth daily. Indications: high blood pressure 90 Tab 1    dilTIAZem (TAZTIA XT) 360 mg SR capsule TAKE ONE CAPSULE BY MOUTH EVERY DAY 90 Cap 3    alpha-D-galactosidase (BEANO PO) Take  by mouth.  simethicone (GAS-X PO) Take  by mouth.  LORazepam (ATIVAN) 0.5 mg tablet TAKE ONE BY MOUTH DAILY AS NEEDED FOR ANXIETY 30 Tab 0    levothyroxine (SYNTHROID) 100 mcg tablet Take 1 Tab by mouth Daily (before breakfast). 30 Tab 1         PHYSICAL EXAM  Visit Vitals  BP (!) 148/95 (BP 1 Location: Left arm, BP Patient Position: Sitting)   Pulse 63   Temp 98.4 °F (36.9 °C) (Oral)   Resp 16   Ht 5' 4\" (1.626 m)   Wt 211 lb (95.7 kg)   SpO2 96%   BMI 36.22 kg/m²       General: Obese, no distress.   HEENT:  Head normocephalic/atraumatic, no scleral icterus  Lungs:  Clear to ausculation bilaterally. Good air movement. Heart:  Regular rate and rhythm, normal S1 and S2, no murmur, gallop, or rub  Extremities: No clubbing, cyanosis, or edema. Neurological: Alert and oriented. Psychiatric: Normal mood and affect. Behavior is normal.         ASSESSMENT AND PLAN    ICD-10-CM ICD-9-CM    1. Essential hypertension I10 401.9    2. Stage 3 chronic kidney disease (HCC) I46.0 000.0 METABOLIC PANEL, BASIC   3. Encounter for immunization Z23 V03.89 PNEUMOCOCCAL POLYSACCHARIDE VACCINE, 23-VALENT, ADULT OR IMMUNOSUPPRESSED PT DOSE,      CT IMMUNIZ ADMIN,1 SINGLE/COMB VAC/TOXOID      TETANUS, DIPHTHERIA TOXOIDS AND ACELLULAR PERTUSSIS VACCINE (TDAP), IN INDIVIDS. >=7, IM      CT IMMUNIZ,ADMIN,EACH ADDL     Diagnoses and all orders for this visit:    1. Essential hypertension  Improving but still elevated at 148/95. Increase HCTZ to 25 mg daily. Continue losartan and diltiazem. 2. Stage 3 chronic kidney disease ([facility] [address]  -     METABOLIC PANEL, BASIC    3. Encounter for immunization  -     PNEUMOCOCCAL POLYSACCHARIDE VACCINE, 23-VALENT, ADULT OR IMMUNOSUPPRESSED PT DOSE,  -     CT IMMUNIZ ADMIN,1 SINGLE/COMB VAC/TOXOID  -     TETANUS, DIPHTHERIA TOXOIDS AND ACELLULAR PERTUSSIS VACCINE (TDAP), IN INDIVIDS. >=7, IM  -     CT IMMUNIZ,ADMIN,EACH ADDL      Follow-up and Dispositions    · Return in about 1 month (around 2/29/2020), or if symptoms worsen or fail to improve, for HTN. I have discussed the diagnosis with the patient and the intended plan as seen in the above orders. Patient is in agreement. The patient has received an after-visit summary and questions were answered concerning future plans. I have discussed medication side effects and warnings with the patient as well.

## 2020-01-31 NOTE — PROGRESS NOTES
Identified pt with two pt identifiers(name and ). Reviewed record in preparation for visit and have obtained necessary documentation. Chief Complaint   Patient presents with    Hypertension    Immunization/Injection        Health Maintenance Due   Topic    Pneumococcal 0-64 years (1 of 1 - PPSV23)    DTaP/Tdap/Td series (1 - Tdap)    Shingrix Vaccine Age 49> (1 of 2)    BREAST CANCER SCRN MAMMOGRAM        Coordination of Care Questionnaire:  :   1) Have you been to an emergency room, urgent care, or hospitalized since your last visit? If yes, where when, and reason for visit? yes   [facility] for high Bp    2. Have seen or consulted any other health care provider since your last visit? If yes, where when, and reason for visit? YES      3) Do you have an Advanced Directive/ Living Will in place? YES  If yes, do we have a copy on file YES  If no, would you like information NO    Patient is accompanied by  I have received verbal consent from Linsey Zamora to discuss any/all medical information while they are present in the room.   Eric Banuelos

## 2020-02-01 LAB
BUN SERPL-MCNC: 18 MG/DL (ref 6–24)
BUN/CREAT SERPL: 15 (ref 9–23)
CALCIUM SERPL-MCNC: 9.4 MG/DL (ref 8.7–10.2)
CHLORIDE SERPL-SCNC: 106 MMOL/L (ref 96–106)
CO2 SERPL-SCNC: 21 MMOL/L (ref 20–29)
CREAT SERPL-MCNC: 1.2 MG/DL (ref 0.57–1)
GLUCOSE SERPL-MCNC: 107 MG/DL (ref 65–99)
POTASSIUM SERPL-SCNC: 4.3 MMOL/L (ref 3.5–5.2)
SODIUM SERPL-SCNC: 144 MMOL/L (ref 134–144)

## 2020-02-03 NOTE — PROGRESS NOTES
Message sent to patient by My Chart:  Your labs showed that your kidney tests are better than they were 2 weeks ago. Your creatinine is 1.20 and had been 1.37. Creatinine at 1.20 is normal for you.       Dr. Darryle Colt

## 2020-04-22 ENCOUNTER — VIRTUAL VISIT (OUTPATIENT)
Dept: INTERNAL MEDICINE CLINIC | Facility: CLINIC | Age: 60
End: 2020-04-22

## 2020-04-22 VITALS — BODY MASS INDEX: 36.02 KG/M2 | WEIGHT: 211 LBS | HEIGHT: 64 IN

## 2020-04-22 DIAGNOSIS — M54.12 CERVICAL RADICULOPATHY: ICD-10-CM

## 2020-04-22 DIAGNOSIS — M54.2 NECK PAIN: Primary | ICD-10-CM

## 2020-04-22 DIAGNOSIS — E03.9 ACQUIRED HYPOTHYROIDISM: ICD-10-CM

## 2020-04-22 RX ORDER — LEVOTHYROXINE SODIUM 100 UG/1
100 TABLET ORAL
Qty: 90 TAB | Refills: 1 | Status: SHIPPED | OUTPATIENT
Start: 2020-04-22 | End: 2021-09-09

## 2020-04-22 RX ORDER — METHYLPREDNISOLONE 4 MG/1
TABLET ORAL
Qty: 1 DOSE PACK | Refills: 0 | Status: SHIPPED | OUTPATIENT
Start: 2020-04-22 | End: 2020-05-04

## 2020-04-22 RX ORDER — FAMOTIDINE 20 MG/1
20 TABLET, FILM COATED ORAL DAILY
COMMUNITY
End: 2021-04-28 | Stop reason: ALTCHOICE

## 2020-04-22 NOTE — PROGRESS NOTES
Consent: Coleta Ganser, who was seen by synchronous (real-time) audio-video technology, and/or her healthcare decision maker, is aware that this patient-initiated, Telehealth encounter on 4/22/2020 is a billable service, with coverage as determined by her insurance carrier. She is aware that she may receive a bill and has provided verbal consent to proceed: Yes. Assessment & Plan:   Diagnoses and all orders for this visit:    1. Neck pain  Likely due to #2. 2. Cervical radiculopathy  -     Start methylPREDNISolone (MEDROL DOSEPACK) 4 mg tablet; Take following package instructions. 3. Acquired hypothyroidism  -     Refill levothyroxine (SYNTHROID) 100 mcg tablet; Take 1 Tab by mouth Daily (before breakfast). Follow-up and Dispositions    · Return in about 3 months (around 7/22/2020), or if symptoms worsen or fail to improve, for HTN, thyroid. 712  Subjective:   Coleta Ganser is a 61 y.o. female who was seen for Neck Pain (Left // x 1 week // tylenol is not working // took advil did work for about 1 hour // ); Arm Pain (Left // x 1 week ); and Shoulder Pain (Left Shoulder Blade // x last couple of days )    Patient complains of 1 week history of pain at the left side of the neck that radiates to left shoulder blade and down left arm. Pain is sharp, 8/10, constant but better in the mornings. No relief with Tylenol. Advil, which she was told not to take because of her kidneys, helped for 1 hour. Does work with head slightly bent looking at a computer screen for 12 hrs a side. Denies recent injury or trauma. Prior to Admission medications    Medication Sig Start Date End Date Taking? Authorizing Provider   famotidine (PEPCID) 20 mg tablet Take 20 mg by mouth daily. Buys OTC   Yes Provider, Historical   hydroCHLOROthiazide (HYDRODIURIL) 25 mg tablet Take 1 Tab by mouth daily.  Indications: high blood pressure 1/31/20  Yes Christy Josih MD   FLUoxetine (PROZAC) 20 mg capsule 1/9/20  Yes Provider, Historical   losartan (COZAAR) 100 mg tablet Take 1 Tab by mouth daily. Indications: high blood pressure 1/20/20  Yes Jason Wall NP   cholecalciferol (VITAMIN D3) 5,000 unit capsule Take 1 Cap by mouth daily. Indications: low vitamin D levels 10/3/19  Yes Ana Joshi MD   buPROPion XL (WELLBUTRIN XL) 300 mg XL tablet Take 1 Tab by mouth every morning. 9/18/19  Yes Kady Bryan MD   QUEtiapine (SEROQUEL) 50 mg tablet Take 2 Tabs by mouth nightly. 8/8/19  Yes Jerman Joshi MD   dilTIAZem (TAZTIA XT) 360 mg SR capsule TAKE ONE CAPSULE BY MOUTH EVERY DAY 7/8/19  Yes Jerman Joshi MD   simethicone (GAS-X PO) Take  by mouth. Yes Provider, Historical   LORazepam (ATIVAN) 0.5 mg tablet TAKE ONE BY MOUTH DAILY AS NEEDED FOR ANXIETY 6/7/19  Yes Kady Byran MD   levothyroxine (SYNTHROID) 100 mcg tablet Take 1 Tab by mouth Daily (before breakfast). 3/26/14  Yes Gela Dutta NP     Allergies   Allergen Reactions    Lisinopril Cough    Lortab [Hydrocodone-Acetaminophen] Itching    Morphine Nausea and Vomiting       Patient Active Problem List   Diagnosis Code    Hypertension I10    LVH (left ventricular hypertrophy) due to hypertensive disease I11.9    Venous stasis I87.8    Severe episode of recurrent major depressive disorder, without psychotic features (HCC) F33.2    Panic disorder F41.0    Acquired hypothyroidism E03.9    Vitamin D deficiency E55.9    Chronic insomnia F51.04    Gastroesophageal reflux disease without esophagitis K21.9    History of gastric bypass Z98.84    Tobacco use Z72.0    Obesity (BMI 30-39. 9) E66.9    IGT (impaired glucose tolerance) R73.02    Stage 3 chronic kidney disease (HCC) N18.3    Prediabetes R73.03    Mixed hyperlipidemia E78.2    Chronic pain of both knees M25.561, M25.562, G89.29    Neck pain M54.2    OCD (obsessive compulsive disorder) F42.9    PTSD (post-traumatic stress disorder) F43.10    Severe obesity ([address] E66.01           Objective:     Visit Vitals  Ht 5' 4\" (1.626 m)   Wt 211 lb (95.7 kg) Comment: Patient Reported   BMI 36.22 kg/m²      General: alert, cooperative, no distress   Mental  status: normal mood, behavior, speech, dress, motor activity, and thought processes, able to follow commands   HENT: NCAT   Neck: no visualized mass   Resp: no respiratory distress   Neuro: no gross deficits   Skin: no discoloration or lesions of concern on visible areas   Psychiatric: normal affect, consistent with stated mood, no evidence of hallucinations     Additional exam findings: Slightly decreased neck extension      We discussed the expected course, resolution and complications of the diagnosis(es) in detail. Medication risks, benefits, costs, interactions, and alternatives were discussed as indicated. I advised her to contact the office if her condition worsens, changes or fails to improve as anticipated. She expressed understanding with the diagnosis(es) and plan. Coleta Ganser is a 61 y.o. female being evaluated by a video visit encounter for concerns as above. A caregiver was present when appropriate. Due to this being a TeleHealth encounter (During [facility]- public health emergency), evaluation of the following organ systems was limited: Vitals/Constitutional/EENT/Resp/CV/GI//MS/Neuro/Skin/Heme-Lymph-Imm. Pursuant to the emergency declaration under the [facility], 1135 waiver authority and the [employer] Act, this Virtual  Visit was conducted, with patient's (and/or legal guardian's) consent, to reduce the patient's risk of exposure to COVID-19 and provide necessary medical care. THIS VISIT WAS COMPLETED VIRTUALLY USING [employer] ME    Services were provided through a video synchronous discussion virtually to substitute for in-person clinic visit.    Patient and provider were located at their individual [facility].         Lloyd Antony MD

## 2020-04-22 NOTE — PROGRESS NOTES
Cleo Bowie  Identified pt with two pt identifiers(name and ). Chief Complaint   Patient presents with    Neck Pain     Left // x 1 week // tylenol is not working // took advil did work for about 1 hour //     Arm Pain     Left // x 1 week     Shoulder Pain     Left Shoulder Blade // x last couple of days        Reviewed record In preparation for visit and have obtained necessary documentation. 1. Have you been to the ER, urgent care clinic or hospitalized since your last visit? No     2. Have you seen or consulted any other health care providers outside of the [address] [place] [address] since your last visit? Include any pap smears or colon screening. No    Patient has an living will and advised to bring in copy when the office opens back up. Vitals reviewed with provider.     Health Maintenance reviewed:     Health Maintenance Due   Topic    Shingrix Vaccine Age 50> (1 of 2)          Wt Readings from Last 3 Encounters:   20 211 lb (95.7 kg)   20 211 lb (95.7 kg)   20 211 lb (95.7 kg)        Temp Readings from Last 3 Encounters:   20 98.4 °F (36.9 °C) (Oral)   20 98.1 °F (36.7 °C) (Oral)   20 98 °F (36.7 °C) (Oral)        BP Readings from Last 3 Encounters:   20 (!) 148/95   20 145/84   20 (!) 170/98        Pulse Readings from Last 3 Encounters:   20 63   20 75   20 67        Vitals:    20 1311   Weight: 211 lb (95.7 kg)   Height: 5' 4\" (1.626 m)   PainSc:   8   PainLoc: Neck          Learning Assessment:   :       Learning Assessment 2017   PRIMARY LEARNER Patient   PRIMARY LANGUAGE ENGLISH   LEARNER PREFERENCE PRIMARY DEMONSTRATION   ANSWERED BY patient   RELATIONSHIP SELF        Depression Screening:   :       3 most recent PHQ Screens 2019   Little interest or pleasure in doing things Nearly every day   Feeling down, depressed, irritable, or hopeless Nearly every day   Total Score PHQ 2 6   Trouble falling or staying asleep, or sleeping too much Nearly every day   Feeling tired or having little energy Nearly every day   Poor appetite, weight loss, or overeating Nearly every day   Feeling bad about yourself - or that you are a failure or have let yourself or your family down Not at all   Trouble concentrating on things such as school, work, reading, or watching TV Nearly every day   Moving or speaking so slowly that other people could have noticed; or the opposite being so fidgety that others notice Nearly every day   Thoughts of being better off dead, or hurting yourself in some way Not at all   PHQ 9 Score 21   How difficult have these problems made it for you to do your work, take care of your home and get along with others Extremely difficult        Fall Risk Assessment:   :     No flowsheet data found. Abuse Screening:   :     No flowsheet data found.      ADL Screening:   :       ADL Assessment 6/7/2019   Feeding yourself No Help Needed   Getting from bed to chair No Help Needed   Getting dressed No Help Needed   Bathing or showering No Help Needed   Walk across the room (includes cane/walker) No Help Needed   Using the telphone No Help Needed   Taking your medications No Help Needed   Preparing meals No Help Needed   Managing money (expenses/bills) No Help Needed   Moderately strenuous housework (laundry) No Help Needed   Shopping for personal items (toiletries/medicines) No Help Needed   Shopping for groceries No Help Needed   Driving No Help Needed   Climbing a flight of stairs No Help Needed   Getting to places beyond walking distances No Help Needed

## 2020-05-04 ENCOUNTER — TELEPHONE (OUTPATIENT)
Dept: INTERNAL MEDICINE CLINIC | Facility: CLINIC | Age: 60
End: 2020-05-04

## 2020-05-04 ENCOUNTER — VIRTUAL VISIT (OUTPATIENT)
Dept: INTERNAL MEDICINE CLINIC | Facility: CLINIC | Age: 60
End: 2020-05-04

## 2020-05-04 VITALS — WEIGHT: 211 LBS | BODY MASS INDEX: 36.02 KG/M2 | HEIGHT: 64 IN

## 2020-05-04 DIAGNOSIS — F41.0 PANIC DISORDER: ICD-10-CM

## 2020-05-04 DIAGNOSIS — R42 DIZZINESS: Primary | ICD-10-CM

## 2020-05-04 DIAGNOSIS — I10 ESSENTIAL HYPERTENSION: ICD-10-CM

## 2020-05-04 RX ORDER — FLUOXETINE HYDROCHLORIDE 60 MG/1
60 TABLET, FILM COATED ORAL; ORAL
COMMUNITY
Start: 2020-03-26

## 2020-05-04 NOTE — TELEPHONE ENCOUNTER
Pt called and requested a call back at [phone]. Pt wanted to let  Dr. Taurus Gastelum that she had a \"dizzy spell\" on Saturday that caused her to \"blink out\". She is concerned that this may be related to her high bp. Please give a call back to discuss.

## 2020-05-04 NOTE — PROGRESS NOTES
Sravanthi Peacock is a 61 y.o. female who was seen by synchronous (real-time) audio-video technology on 5/4/2020. Consent: Sravanthi Peacock, who was seen by synchronous (real-time) audio-video technology, and/or her healthcare decision maker, is aware that this patient-initiated, Telehealth encounter on 5/4/2020 is a billable service, with coverage as determined by her insurance carrier. She is aware that she may receive a bill and has provided verbal consent to proceed: Yes. Assessment & Plan:     Diagnoses and all orders for this visit:    1. Dizziness  Most likely due to elevated BP and anxiety. Symptoms unlikely due to TIA. 2. Essential hypertension  Intermittent BP elevations very likely due to anxiety. Continue present medications and monitoring home BP. Instructed to call if dizziness or elevated BP episodes recur. 3. Panic disorder  Patient instructed to take lorazepam 0.5 mg when she sees SBP elevated over 170. Follow-up and Dispositions    · Return in about 3 months (around 7/22/2020), or if symptoms worsen or fail to improve, for 3 month follow up for HTN and thyroid. Subjective:   Sravanthi Peacock is a 61 y.o. female who was seen for Dizziness (Dizzy spells on Satuday // Made patient blank out for a few seconds // Fainted and woke up with left arm shaking)    Presents for dizziness. She has HTN, CKD stage 3, hypothyroidism, hyperlipidemia, major depression, anxiety disorder with panic attacks, GERD, history of gastric bypass in 2013 with loss of 145 lbs, history of ischemic colitis in 2009, and family history of colon polyps and kidney disease requiring hemodialysis.       Reports having episode of dizziness 2 days ago around 11 am at home. Was walking and suddenly felt dizzy, sat on the ground, felt \"out of it\" for about 10 secs with no complete loss of consciousness, and had left arm shaking for a few seconds after becoming more alert.   Felt lightheaded on and off throughout the day.  No dizziness since then. Denies past history of similar dizziness. Drinking water regularly, eating regularly, no recent medication changes. Her BP was high at 180/107 earlier in the morning. Says she was concerned that the dizziness was a mini-stroke. Denies numbness/tingling or focal weakness. BP has been intermittently high recently; has felt increased anxiety due to COVID-19 pandemic. Has not been taking lorazepam because did not want to get addicted to it. BP this morning 147/78. Prior to Admission medications    Medication Sig Start Date End Date Taking? Authorizing Provider   FLUoxetine (PROzac) 40 mg capsule TAKE 1 CAPSULE BY MOUTH ONCE DAILY 3/26/20  Yes Provider, Historical   famotidine (PEPCID) 20 mg tablet Take 20 mg by mouth daily. Buys OTC   Yes Provider, Historical   levothyroxine (SYNTHROID) 100 mcg tablet Take 1 Tab by mouth Daily (before breakfast). 4/22/20  Yes Nicolle Joshi MD   hydroCHLOROthiazide (HYDRODIURIL) 25 mg tablet Take 1 Tab by mouth daily. Indications: high blood pressure 1/31/20  Yes Ana Joshi MD   losartan (COZAAR) 100 mg tablet Take 1 Tab by mouth daily. Indications: high blood pressure 1/20/20  Yes Shama Purdy NP   cholecalciferol (VITAMIN D3) 5,000 unit capsule Take 1 Cap by mouth daily. Indications: low vitamin D levels 10/3/19  Yes Ana Joshi MD   buPROPion XL (WELLBUTRIN XL) 300 mg XL tablet Take 1 Tab by mouth every morning. 9/18/19  Yes Bonnie Parisi MD   QUEtiapine (SEROQUEL) 50 mg tablet Take 2 Tabs by mouth nightly. 8/8/19  Yes Nicolle Joshi MD   dilTIAZem (TAZTIA XT) 360 mg SR capsule TAKE ONE CAPSULE BY MOUTH EVERY DAY 7/8/19  Yes Nicolle Joshi MD   simethicone (GAS-X PO) Take  by mouth.    Yes Provider, Historical   LORazepam (ATIVAN) 0.5 mg tablet TAKE ONE BY MOUTH DAILY AS NEEDED FOR ANXIETY 6/7/19  Yes Bonnie Parisi MD     Allergies   Allergen Reactions    Lisinopril Cough    Lortab [Hydrocodone-Acetaminophen] Itching  Morphine Nausea and Vomiting       Patient Active Problem List   Diagnosis Code    Hypertension I10    LVH (left ventricular hypertrophy) due to hypertensive disease I11.9    Venous stasis I87.8    Severe episode of recurrent major depressive disorder, without psychotic features (HCC) F33.2    Panic disorder F41.0    Acquired hypothyroidism E03.9    Vitamin D deficiency E55.9    Chronic insomnia F51.04    Gastroesophageal reflux disease without esophagitis K21.9    History of gastric bypass Z98.84    Tobacco use Z72.0    Obesity (BMI 30-39. 9) E66.9    IGT (impaired glucose tolerance) R73.02    Stage 3 chronic kidney disease (HCC) N18.3    Prediabetes R73.03    Mixed hyperlipidemia E78.2    Chronic pain of both knees M25.561, M25.562, G89.29    Neck pain M54.2    OCD (obsessive compulsive disorder) F42.9    PTSD (post-traumatic stress disorder) F43.10    Severe obesity (HCC) E66.01       ROS  A complete review of systems was performed and is negative except for those mentioned in the HPI.     Objective:   Vital Signs: (As obtained by patient/caregiver at home)  Visit Vitals  Ht 5' 4\" (1.626 m)   Wt 211 lb (95.7 kg) Comment: Patient Reported   BMI 36.22 kg/m²        Constitutional: [x] Appears well-developed and well-nourished [x] No apparent distress      [] Abnormal -     Mental status: [x] Alert and awake  [x] Oriented to person/place/time [x] Able to follow commands    [] Abnormal -     Eyes:   EOM    [x]  Normal    [] Abnormal -   Sclera  [x]  Normal    [] Abnormal -          Discharge [x]  None visible   [] Abnormal -     HENT: [x] Normocephalic, atraumatic  [] Abnormal -   [x] Mouth/Throat: Mucous membranes are moist    External Ears [x] Normal  [] Abnormal -    Neck: [x] No visualized mass [] Abnormal -     Pulmonary/Chest: [x] Respiratory effort normal   [x] No visualized signs of difficulty breathing or respiratory distress        [] Abnormal -      Musculoskeletal:   [x] Normal gait with no signs of ataxia         [x] Normal range of motion of neck        [] Abnormal -     Neurological:        [x] No Facial Asymmetry (Cranial nerve 7 motor function) (limited exam due to video visit)          [x] No gaze palsy        [] Abnormal -          Skin:        [x] No significant exanthematous lesions or discoloration noted on facial skin         [] Abnormal -            Psychiatric:       [x] Normal Affect [] Abnormal -        [x] No Hallucinations    Other pertinent observable physical exam findings:- none        We discussed the expected course, resolution and complications of the diagnosis(es) in detail. Medication risks, benefits, costs, interactions, and alternatives were discussed as indicated. I advised her to contact the office if her condition worsens, changes or fails to improve as anticipated. She expressed understanding with the diagnosis(es) and plan. Lor Garcia is a 61 y.o. female who was evaluated by a video visit encounter for concerns as above. Patient identification was verified prior to start of the visit. A caregiver was present when appropriate. Due to this being a TeleHealth encounter (During [place] [address] public health emergency), evaluation of the following organ systems was limited: Vitals/Constitutional/EENT/Resp/CV/GI//MS/Neuro/Skin/Heme-Lymph-Imm. Pursuant to the emergency declaration under the [facility], 1135 waiver authority and the [employer] Act, this Virtual  Visit was conducted, with patient's (and/or legal guardian's) consent, to reduce the patient's risk of exposure to COVID-19 and provide necessary medical care. THIS VISIT WAS COMPLETED VIRTUALLY USING [email or web address] ME     Services were provided through a video synchronous discussion virtually to substitute for in-person clinic visit. Patient and provider were located at their individual homes.       Ceci Segura MD

## 2020-05-04 NOTE — TELEPHONE ENCOUNTER
Patient states she had dizziness on/off sat and an episode where \"things got dark\" and she was sitting on side of bed with left arm shaking. She wants to avoid ER.  Appointment -virtual visit scheduled for this am.

## 2020-05-04 NOTE — PROGRESS NOTES
Cleo Bowie  Identified pt with two pt identifiers(name and ). Chief Complaint   Patient presents with    Dizziness     Dizzy spells on Satuday // Made patient blank out for a few seconds // Fainted and woke up with left arm shaking       Reviewed record In preparation for visit and have obtained necessary documentation. 1. Have you been to the ER, urgent care clinic or hospitalized since your last visit? No     2. Have you seen or consulted any other health care providers outside of the [address] [place] [address] since your last visit? Include any pap smears or colon screening. No    Vitals reviewed with provider.     Health Maintenance reviewed:     Health Maintenance Due   Topic    Shingrix Vaccine Age 50> (1 of 2)          Wt Readings from Last 3 Encounters:   20 211 lb (95.7 kg)   20 211 lb (95.7 kg)   20 211 lb (95.7 kg)        Temp Readings from Last 3 Encounters:   20 98.4 °F (36.9 °C) (Oral)   20 98.1 °F (36.7 °C) (Oral)   20 98 °F (36.7 °C) (Oral)        BP Readings from Last 3 Encounters:   20 (!) 148/95   20 145/84   20 (!) 170/98        Pulse Readings from Last 3 Encounters:   20 63   20 75   20 67        Vitals:    20 1018   Weight: 211 lb (95.7 kg)   Height: 5' 4\" (1.626 m)   PainSc:   0 - No pain          Learning Assessment:   :       Learning Assessment 2017   PRIMARY LEARNER Patient   PRIMARY LANGUAGE ENGLISH   LEARNER PREFERENCE PRIMARY DEMONSTRATION   ANSWERED BY patient   RELATIONSHIP SELF        Depression Screening:   :       3 most recent PHQ Screens 2019   Little interest or pleasure in doing things Nearly every day   Feeling down, depressed, irritable, or hopeless Nearly every day   Total Score PHQ 2 6   Trouble falling or staying asleep, or sleeping too much Nearly every day   Feeling tired or having little energy Nearly every day   Poor appetite, weight loss, or overeating Nearly every day Feeling bad about yourself - or that you are a failure or have let yourself or your family down Not at all   Trouble concentrating on things such as school, work, reading, or watching TV Nearly every day   Moving or speaking so slowly that other people could have noticed; or the opposite being so fidgety that others notice Nearly every day   Thoughts of being better off dead, or hurting yourself in some way Not at all   PHQ 9 Score 21   How difficult have these problems made it for you to do your work, take care of your home and get along with others Extremely difficult        Fall Risk Assessment:   :     No flowsheet data found. Abuse Screening:   :     No flowsheet data found.      ADL Screening:   :       ADL Assessment 6/7/2019   Feeding yourself No Help Needed   Getting from bed to chair No Help Needed   Getting dressed No Help Needed   Bathing or showering No Help Needed   Walk across the room (includes cane/walker) No Help Needed   Using the telphone No Help Needed   Taking your medications No Help Needed   Preparing meals No Help Needed   Managing money (expenses/bills) No Help Needed   Moderately strenuous housework (laundry) No Help Needed   Shopping for personal items (toiletries/medicines) No Help Needed   Shopping for groceries No Help Needed   Driving No Help Needed   Climbing a flight of stairs No Help Needed   Getting to places beyond walking distances No Help Needed

## 2021-03-30 ENCOUNTER — OFFICE VISIT (OUTPATIENT)
Dept: INTERNAL MEDICINE CLINIC | Age: 61
End: 2021-03-30
Payer: COMMERCIAL

## 2021-03-30 VITALS
OXYGEN SATURATION: 97 % | TEMPERATURE: 98.7 F | HEART RATE: 72 BPM | BODY MASS INDEX: 38.16 KG/M2 | DIASTOLIC BLOOD PRESSURE: 107 MMHG | RESPIRATION RATE: 12 BRPM | HEIGHT: 64 IN | WEIGHT: 223.5 LBS | SYSTOLIC BLOOD PRESSURE: 182 MMHG

## 2021-03-30 DIAGNOSIS — K21.9 GASTROESOPHAGEAL REFLUX DISEASE WITHOUT ESOPHAGITIS: ICD-10-CM

## 2021-03-30 DIAGNOSIS — R73.02 IGT (IMPAIRED GLUCOSE TOLERANCE): ICD-10-CM

## 2021-03-30 DIAGNOSIS — I11.9 HYPERTENSIVE LEFT VENTRICULAR HYPERTROPHY, WITHOUT HEART FAILURE: ICD-10-CM

## 2021-03-30 DIAGNOSIS — R10.12 LUQ PAIN: Primary | ICD-10-CM

## 2021-03-30 PROCEDURE — 99214 OFFICE O/P EST MOD 30 MIN: CPT | Performed by: PHYSICIAN ASSISTANT

## 2021-03-30 PROCEDURE — 93000 ELECTROCARDIOGRAM COMPLETE: CPT | Performed by: PHYSICIAN ASSISTANT

## 2021-03-30 RX ORDER — PANTOPRAZOLE SODIUM 40 MG/1
40 TABLET, DELAYED RELEASE ORAL DAILY
Qty: 30 TAB | Refills: 3 | Status: SHIPPED | OUTPATIENT
Start: 2021-03-30 | End: 2021-07-13 | Stop reason: SDUPTHER

## 2021-03-30 NOTE — PROGRESS NOTES
Julie Krabbe is a 61y.o. year old female seen in clinic today for   Chief Complaint   Patient presents with    Mass     left rib       she is here today to L lower rib \"folded over organ\" she can feel. Would be able to feel it for 3 weeks or so before going away. Now has had it for about a week straight. Was previously intermittent. Had gastric bypass 2013. Has heart burn, got nausea and felt like she was having a heart attack about a week ago. She notes she has had episodes of some slight SOB. She reports she has not been taking her losartan 100 mg because \"she just gets sick of taking so many medications. She notes her depression has been bad a few days after the loss of her son in 2014. She reports she has been using Famotidine for GERD without much effect. she specifically denies any  SOB, HA. Dizziness, fevers, chills, N/V/D, urinary symptoms or other bowel changes. Current Outpatient Medications on File Prior to Visit   Medication Sig Dispense Refill    dilTIAZem ER (TIAZAC) 360 mg capsule Take 1 capsule by mouth once daily 90 Cap 1    FLUoxetine 60 mg tab 60 mg.      levothyroxine (SYNTHROID) 100 mcg tablet Take 1 Tab by mouth Daily (before breakfast). 90 Tab 1    hydroCHLOROthiazide (HYDRODIURIL) 25 mg tablet Take 1 Tab by mouth daily. Indications: high blood pressure 90 Tab 1    buPROPion XL (WELLBUTRIN XL) 300 mg XL tablet Take 1 Tab by mouth every morning. 90 Tab 3    QUEtiapine (SEROQUEL) 50 mg tablet Take 2 Tabs by mouth nightly. 60 Tab 5    simethicone (GAS-X PO) Take  by mouth.  LORazepam (ATIVAN) 0.5 mg tablet TAKE ONE BY MOUTH DAILY AS NEEDED FOR ANXIETY 30 Tab 0    famotidine (PEPCID) 20 mg tablet Take 20 mg by mouth daily. Buys OTC      losartan (COZAAR) 100 mg tablet Take 1 Tab by mouth daily. Indications: high blood pressure 90 Tab 0    cholecalciferol (VITAMIN D3) 5,000 unit capsule Take 1 Cap by mouth daily.  Indications: low vitamin D levels 30 Cap 5     No current facility-administered medications on file prior to visit. Allergies   Allergen Reactions    Lisinopril Cough    Lortab [Hydrocodone-Acetaminophen] Itching    Morphine Nausea and Vomiting     Past Medical History:   Diagnosis Date    Anxiety disorder     Arthritis     knee arthritis    Depression     Fatigue     GERD (gastroesophageal reflux disease)     Headache     Heart disease     Hiatal hernia     Hypertension     Ischemic colon ([facility] [address] 2009    Kidney disease     Leg swelling     LVH (left ventricular hypertrophy) due to hypertensive disease     dr Edinson Jenkins    Morbid obesity ([facility] [address]     Postmenopausal     Short of breath on exertion     Thyroid disease     TIA (transient ischemic attack)     Venous stasis     Vertigo       Past Surgical History:   Procedure Laterality Date    COLONOSCOPY,DIAGNOSTIC  12/31/2012    Dr. Pamela Rojo.  Int hemorrhoids, repeat in 10 yrs    ENDOSCOPY, COLON, DIAGNOSTIC  03/2009    3/09- repeat q 3 for ischemic colitis    HX CHOLECYSTECTOMY      HX GYN  [id number]    2 c-sections    HX GYN  2009    endometrial ablation    HX LAP GASTRIC BYPASS  03/28/2013    Dr. Calhoun Lori HX WISDOM TEETH EXTRACTION          Family History   Problem Relation Age of Onset    Heart Disease Mother         PULM HTN    Stroke Mother     Hypertension Mother     Colon Polyps Mother     Kidney Disease Mother         DIALYSIS PT    Thyroid Disease Mother     Other Mother         BLOOD CLOT HX    Diabetes Father     Hypertension Father     Colon Polyps Father     Heart Disease Father     Psychiatric Disorder Father         Arch Dawkins Diabetes Brother     Hypertension Brother     Kidney Disease Maternal Aunt         DIALYSIS    Kidney Disease Maternal Uncle     Kidney Disease Maternal Grandmother     Heart Disease Maternal Grandmother     Diabetes Paternal Grandfather     Kidney Disease Maternal Aunt         DIALYSIS    Kidney Disease Maternal Uncle         DIALYSIS    Other Son     Colon Polyps Maternal Grandfather         Social History     Socioeconomic History    Marital status:      Spouse name: Not on file    Number of children: Not on file    Years of education: Not on file    Highest education level: Not on file   Occupational History    Not on file   Social Needs    Financial resource strain: Not on file    Food insecurity     Worry: Not on file     Inability: Not on file   [employer] needs     Medical: Not on file     Non-medical: Not on file   Tobacco Use    Smoking status: Former Smoker     Packs/day: 1.00    Smokeless tobacco: Former User     Quit date: 1/29/2020   Substance and Sexual Activity    Alcohol use: No    Drug use: No    Sexual activity: Yes   Lifestyle    Physical activity     Days per week: Not on file     Minutes per session: Not on file    Stress: Not on file   Relationships    Social connections     Talks on phone: Not on file     Gets together: Not on file     Attends [employer] service: Not on file     Active member of club or organization: Not on file     Attends meetings of clubs or organizations: Not on file     Relationship status: Not on file    Intimate partner violence     Fear of current or ex partner: Not on file     Emotionally abused: Not on file     Physically abused: Not on file     Forced sexual activity: Not on file   Other Topics Concern    Not on file   Social History Narrative    Not on file           Visit Vitals  BP (!) 182/107 (BP 1 Location: Left upper arm, BP Patient Position: Sitting)   Pulse 72   Temp 98.7 °F (37.1 °C) (Oral)   Resp 12   Ht 5' 4\" (1.626 m)   Wt 223 lb 8 oz (101.4 kg)   SpO2 97%   BMI 38.36 kg/m²       Review of Systems   Constitutional: Negative for chills, fever, malaise/fatigue and weight loss. Respiratory: Negative for cough, shortness of breath and wheezing. Cardiovascular: Negative for chest pain, palpitations and leg swelling. Gastrointestinal: Positive for abdominal pain. Negative for blood in stool, constipation, diarrhea, heartburn, melena, nausea and vomiting. Genitourinary: Negative for dysuria and frequency. Musculoskeletal: Negative for myalgias. Skin: Negative for rash. Neurological: Negative for dizziness, weakness and headaches. Endo/Heme/Allergies: Does not bruise/bleed easily. Psychiatric/Behavioral: Negative for depression. All other systems reviewed and are negative. Physical Exam  Vitals signs and nursing note reviewed. Constitutional:       Appearance: Normal appearance. She is normal weight. HENT:      Head: Normocephalic and atraumatic. Right Ear: Tympanic membrane, ear canal and external ear normal.      Left Ear: Tympanic membrane, ear canal and external ear normal.      Nose: Nose normal.      Mouth/Throat:      Mouth: Mucous membranes are moist.      Pharynx: Oropharynx is clear. Eyes:      Conjunctiva/sclera: Conjunctivae normal.      Pupils: Pupils are equal, round, and reactive to light. Neck:      Musculoskeletal: Normal range of motion and neck supple. No neck rigidity. Vascular: No carotid bruit. Cardiovascular:      Rate and Rhythm: Normal rate and regular rhythm. Pulses: Normal pulses. Heart sounds: Normal heart sounds. Pulmonary:      Effort: Pulmonary effort is normal.      Breath sounds: Normal breath sounds. No wheezing, rhonchi or rales. Abdominal:      General: Abdomen is flat. Bowel sounds are normal. There is no distension. Palpations: There is no mass. Tenderness: There is abdominal tenderness in the left upper quadrant. There is no guarding or rebound. Musculoskeletal: Normal range of motion. Skin:     General: Skin is warm and dry. Capillary Refill: Capillary refill takes less than 2 seconds. Neurological:      General: No focal deficit present. Mental Status: She is alert and oriented to person, place, and time. Sensory: No sensory deficit. Gait: Gait normal.   Psychiatric:         Mood and Affect: Mood normal.         Behavior: Behavior normal.         Thought Content: Thought content normal.        Discussed with Dr. Benjy Barnes. Agree's with Care plan. EKG: normal EKG, normal sinus rhythm, unchanged from previous tracings, low voltage in limb leads. No results found for this or any previous visit (from the past 24 hour(s)). ASSESSMENT AND PLAN  Diagnoses and all orders for this visit:    1. LUQ pain  -     AMB POC EKG ROUTINE W/ 12 LEADS, INTER & REP  -     CT ABD W CONT AND PELVIS W WO CONT; Future  -     REFERRAL TO GASTROENTEROLOGY  After discussion with Dr. Benjy Barnes we feel mass/tenderness is likely coming from stomach. Patient is due for colonoscopy and would benefit from added treatment of PPI and EGD. Will obtain CT to look at structural problems and spleen. 2. Hypertensive left ventricular hypertrophy, without heart failure  -     CBC WITH AUTOMATED DIFF; Future  -     METABOLIC PANEL, COMPREHENSIVE; Future  -     LIPID PANEL; Future  -     HEMOGLOBIN A1C WITH EAG; Future  -     CT ABD W CONT AND PELVIS W WO CONT; Future  BP uncontrolled due to lack of compliance with medications. Advised to ensure she is taking all blood pressure medications daily. Monitor BP at home and follow up with Dr. Benjy Barnes  3. Gastroesophageal reflux disease without esophagitis  -     CT ABD W CONT AND PELVIS W WO CONT; Future  -     REFERRAL TO GASTROENTEROLOGY  Start 40 mg Pantoprazole  4. IGT (impaired glucose tolerance)  Check labs before next visit with PCP  Other orders  -     pantoprazole (PROTONIX) 40 mg tablet; Take 1 Tab by mouth daily. I have discussed the diagnosis with the patient and the intended plan as seen in the above orders. Patient is in agreement. The patient has received an after-visit summary and questions were answered concerning future plans.   I have discussed medication side effects and warnings with the patient as well.     Julieta Willingham PA-C

## 2021-03-30 NOTE — PROGRESS NOTES
Cleo Bowie  Identified pt with two pt identifiers(name and ). Chief Complaint   Patient presents with    Mass     left rib       Reviewed record In preparation for visit and have obtained necessary documentation. 1. Have you been to the ER, urgent care clinic or hospitalized since your last visit? No     2. Have you seen or consulted any other health care providers outside of the [address] [place] [address] since your last visit? Include any pap smears or colon screening. No    Vitals reviewed with provider.     Health Maintenance reviewed:     Health Maintenance Due   Topic    COVID-19 Vaccine (1)    Shingrix Vaccine Age 50> (1 of 2)    A1C test (Diabetic or Prediabetic)     Flu Vaccine (1)    PAP AKA CERVICAL CYTOLOGY           Wt Readings from Last 3 Encounters:   21 223 lb 8 oz (101.4 kg)   20 211 lb (95.7 kg)   20 211 lb (95.7 kg)        Temp Readings from Last 3 Encounters:   21 98.7 °F (37.1 °C) (Oral)   20 98.4 °F (36.9 °C) (Oral)   20 98.1 °F (36.7 °C) (Oral)        BP Readings from Last 3 Encounters:   21 (!) 182/107   20 (!) 148/95   20 145/84        Pulse Readings from Last 3 Encounters:   21 72   20 63   20 75        Vitals:    21 0828   BP: (!) 182/107   Pulse: 72   Resp: 12   Temp: 98.7 °F (37.1 °C)   TempSrc: Oral   SpO2: 97%   Weight: 223 lb 8 oz (101.4 kg)   Height: 5' 4\" (1.626 m)   PainSc:   0 - No pain          Learning Assessment:   :       Learning Assessment 2017   PRIMARY LEARNER Patient   PRIMARY LANGUAGE ENGLISH   LEARNER PREFERENCE PRIMARY DEMONSTRATION   ANSWERED BY patient   RELATIONSHIP SELF        Depression Screening:   :       3 most recent PHQ Screens 3/30/2021   PHQ Not Done Active Diagnosis of Depression or Bipolar Disorder   Little interest or pleasure in doing things -   Feeling down, depressed, irritable, or hopeless -   Total Score PHQ 2 -   Trouble falling or staying asleep, or sleeping too much -   Feeling tired or having little energy -   Poor appetite, weight loss, or overeating -   Feeling bad about yourself - or that you are a failure or have let yourself or your family down -   Trouble concentrating on things such as school, work, reading, or watching TV -   Moving or speaking so slowly that other people could have noticed; or the opposite being so fidgety that others notice -   Thoughts of being better off dead, or hurting yourself in some way -   PHQ 9 Score -   How difficult have these problems made it for you to do your work, take care of your home and get along with others -        Fall Risk Assessment:   :     No flowsheet data found. Abuse Screening:   :       Abuse Screening Questionnaire 3/30/2021   Do you ever feel afraid of your partner? N   Are you in a relationship with someone who physically or mentally threatens you? N   Is it safe for you to go home?  Y        ADL Screening:   :       ADL Assessment 6/7/2019   Feeding yourself No Help Needed   Getting from bed to chair No Help Needed   Getting dressed No Help Needed   Bathing or showering No Help Needed   Walk across the room (includes cane/walker) No Help Needed   Using the telphone No Help Needed   Taking your medications No Help Needed   Preparing meals No Help Needed   Managing money (expenses/bills) No Help Needed   Moderately strenuous housework (laundry) No Help Needed   Shopping for personal items (toiletries/medicines) No Help Needed   Shopping for groceries No Help Needed   Driving No Help Needed   Climbing a flight of stairs No Help Needed   Getting to places beyond walking distances No Help Needed

## 2021-04-10 LAB
ALBUMIN SERPL-MCNC: 4.3 G/DL (ref 3.8–4.9)
ALBUMIN/GLOB SERPL: 1.9 {RATIO} (ref 1.2–2.2)
ALP SERPL-CCNC: 128 IU/L (ref 39–117)
ALT SERPL-CCNC: 16 IU/L (ref 0–32)
AST SERPL-CCNC: 16 IU/L (ref 0–40)
BASOPHILS # BLD AUTO: 0 X10E3/UL (ref 0–0.2)
BASOPHILS NFR BLD AUTO: 1 %
BILIRUB SERPL-MCNC: <0.2 MG/DL (ref 0–1.2)
BUN SERPL-MCNC: 20 MG/DL (ref 8–27)
BUN/CREAT SERPL: 16 (ref 12–28)
CALCIUM SERPL-MCNC: 9.6 MG/DL (ref 8.7–10.3)
CHLORIDE SERPL-SCNC: 106 MMOL/L (ref 96–106)
CHOLEST SERPL-MCNC: 227 MG/DL (ref 100–199)
CO2 SERPL-SCNC: 24 MMOL/L (ref 20–29)
CREAT SERPL-MCNC: 1.28 MG/DL (ref 0.57–1)
EOSINOPHIL # BLD AUTO: 0.1 X10E3/UL (ref 0–0.4)
EOSINOPHIL NFR BLD AUTO: 2 %
ERYTHROCYTE [DISTWIDTH] IN BLOOD BY AUTOMATED COUNT: 13.4 % (ref 11.7–15.4)
EST. AVERAGE GLUCOSE BLD GHB EST-MCNC: 128 MG/DL
GLOBULIN SER CALC-MCNC: 2.3 G/DL (ref 1.5–4.5)
GLUCOSE SERPL-MCNC: 102 MG/DL (ref 65–99)
HBA1C MFR BLD: 6.1 % (ref 4.8–5.6)
HCT VFR BLD AUTO: 36.7 % (ref 34–46.6)
HDLC SERPL-MCNC: 67 MG/DL
HGB BLD-MCNC: 12.1 G/DL (ref 11.1–15.9)
IMM GRANULOCYTES # BLD AUTO: 0 X10E3/UL (ref 0–0.1)
IMM GRANULOCYTES NFR BLD AUTO: 0 %
LDLC SERPL CALC-MCNC: 128 MG/DL (ref 0–99)
LYMPHOCYTES # BLD AUTO: 1.6 X10E3/UL (ref 0.7–3.1)
LYMPHOCYTES NFR BLD AUTO: 26 %
MCH RBC QN AUTO: 26.9 PG (ref 26.6–33)
MCHC RBC AUTO-ENTMCNC: 33 G/DL (ref 31.5–35.7)
MCV RBC AUTO: 82 FL (ref 79–97)
MONOCYTES # BLD AUTO: 0.5 X10E3/UL (ref 0.1–0.9)
MONOCYTES NFR BLD AUTO: 8 %
NEUTROPHILS # BLD AUTO: 4 X10E3/UL (ref 1.4–7)
NEUTROPHILS NFR BLD AUTO: 63 %
PLATELET # BLD AUTO: 310 X10E3/UL (ref 150–450)
POTASSIUM SERPL-SCNC: 4.5 MMOL/L (ref 3.5–5.2)
PROT SERPL-MCNC: 6.6 G/DL (ref 6–8.5)
RBC # BLD AUTO: 4.5 X10E6/UL (ref 3.77–5.28)
SODIUM SERPL-SCNC: 142 MMOL/L (ref 134–144)
TRIGL SERPL-MCNC: 186 MG/DL (ref 0–149)
VLDLC SERPL CALC-MCNC: 32 MG/DL (ref 5–40)
WBC # BLD AUTO: 6.3 X10E3/UL (ref 3.4–10.8)

## 2021-04-12 ENCOUNTER — TELEPHONE (OUTPATIENT)
Dept: INTERNAL MEDICINE CLINIC | Age: 61
End: 2021-04-12

## 2021-04-12 DIAGNOSIS — R10.12 LUQ PAIN: Primary | ICD-10-CM

## 2021-04-12 NOTE — TELEPHONE ENCOUNTER
BRONCHOSCOPY NOTE    Indication: Mucous plug    Operators: Ken Irwin MD    Pre-op Dx: Mucous plug, COVID PNA, Hypoxemic respiratory failure    History: 70M COVID PNA with mucous plugging    Preop medications: fentanyl, Nimbenx    Xray/CT Findings: complete opacification of left hemithorax    Findings:  Bronchoscope inserted through ETT. ETT noted to be in good position. Airway evaluation revealed copious thick, white secretions at the carmen predominantly coming from left mainstem.  Extensive suctioning and lavage with saline and bicarbonate enabled adequate suctioning. Repeat airway evaluation showed patent airways to the subsegmental level.  Bronchoscope then withdrawn from ETT.    Specimens: None    Pre-Bronchoscopy Tuberculosis Risk Screening Tool  To reduce the risk for airborne transmission of Mycobacterium tuberculosis, this assessment form must be completed prior to bronchoscopy procedures being performed outside of a negative pressure environment.    Procedure Date: _______6/14_______________  Health Care Provider Name: ____Dc____________________  Form Completed By: _____Dc___________________  Reason for the Bronchoscopy: _____mucous plug_____________________  Planned Location for the Procedure:  [ ] Emergency Department     [x] Intensive Care Unit     [ ] Operating Room     Other: ___________________    Risk Assessment  I. I have personally evaluated this patient for Mycobacterium tuberculosis and I determined the following risk:  [x ] Low risk or TB     [ ] Significant risk for TB    II. Additional Findings: _________________________    III. Based on the Determined Risk for TB the following Action(s) are Suggested:  1. If there are no risk factors for TB infection proceed with the procedure.  2. If there is low risk or significant risk for TB infection the following recommendations should be followed:            a. Perform the procedure in a negative pressure room, with N95 respirator.            b. If not feasible to move the patient or defer the procedure:                    i. Use a single-bedded room low traffic area to perform the bronchoscopy procedure.                   ii. Place a portable high-efficiency particulate air (HEPA) filter in the space prior to starting the procedure and keep closed.                       Refer to the INF.1132 titled “Tuberculosis Control Strategy Plan” for additional information.                  iii. All Health Care Providers within the procedure room shall wear an N95 respirator.            c. Documentation of the tuberculosis risk assessment should be included within the patient’s medical record. Juan Cancino with [id number] [facility] scheduling department ([phone]) called and stated that the CT of abd was approved but the one for the pelvis was denied. She stated that it would approve with the CPT codes  CT of pelvis with contrast and CPT code 77914 CT of pelvis with out contrast. Juan Cancino asked that if that was ok to do she would put it through and get the pt scheduled, she just need a new order with those codes listed.

## 2021-04-13 ENCOUNTER — TELEPHONE (OUTPATIENT)
Dept: INTERNAL MEDICINE CLINIC | Age: 61
End: 2021-04-13

## 2021-04-13 NOTE — TELEPHONE ENCOUNTER
[facility] did a peer to peer, CT scan approved [id number] per insurance. Message left on Leonor in scheduling vm.

## 2021-04-13 NOTE — TELEPHONE ENCOUNTER
Pt called and stated that she is suppose to have a CT done but her ins will only over if she has a ultrasound first. Pt would like to speak to nurse about this

## 2021-04-13 NOTE — TELEPHONE ENCOUNTER
Bonilla Whittaker called back and said it is being denied, more information is needed. She said it can be done peer to peer.     Peer to peer phone number [phone] Ext [phone]  Case #[id number]    Please give Bonilla Whittaker a call back with any questions   [phone]

## 2021-04-13 NOTE — TELEPHONE ENCOUNTER
Patient called back, waiting for a call back about an ultrasound. Please give patient a call back.     [facility]# [phone]

## 2021-04-14 ENCOUNTER — TELEPHONE (OUTPATIENT)
Dept: INTERNAL MEDICINE CLINIC | Age: 61
End: 2021-04-14

## 2021-04-14 NOTE — TELEPHONE ENCOUNTER
Patient said she had orders done 2 weeks ago and still has not had the scan done, she is still having trouble getting it done. Please give patient a call back as soon as possible.     [facility]# [id number]

## 2021-04-14 NOTE — TELEPHONE ENCOUNTER
Scheduling called and has questions about orders. Patient said she did peer to peer, but scheduling has questions about what needs to be done. Please give a call back.   [facility]# [phone]

## 2021-04-18 NOTE — PROGRESS NOTES
Will discuss results at 4/28/21 appt. Normal liver tests and blood counts. Unchanged CKD stage 3a. Prediabetes with A1c 6.1%, worse than it was in June 2019 (5.9%). High cholesterol: tot chol 227 (was 160 in 6/2019),  (was 78 in 6/2019). Calculated 10-yr ASCVD risk 8.5%.

## 2021-04-20 ENCOUNTER — TELEPHONE (OUTPATIENT)
Dept: INTERNAL MEDICINE CLINIC | Age: 61
End: 2021-04-20

## 2021-04-20 DIAGNOSIS — R10.12 LUQ PAIN: Primary | ICD-10-CM

## 2021-04-20 NOTE — TELEPHONE ENCOUNTER
Leonor with [facility] called regarding patient, she is needing approval.    Please give Bennie Hutson a call back  [facility]# [phone]

## 2021-04-20 NOTE — TELEPHONE ENCOUNTER
I called Dodie Gudino and verified the patient by name and date of birth. She informed that there is already an authorization on file from per to per from 04/12. The per to per looks like it was not going to approve the CT of the pelvis. They need a new order for just the CT of the abdomen.

## 2021-04-26 ENCOUNTER — HOSPITAL ENCOUNTER (OUTPATIENT)
Dept: CT IMAGING | Age: 61
Discharge: HOME | End: 2021-04-26
Attending: PHYSICIAN ASSISTANT
Payer: COMMERCIAL

## 2021-04-26 DIAGNOSIS — R10.12 LUQ PAIN: ICD-10-CM

## 2021-04-26 PROCEDURE — 74011000636 HC RX REV CODE- 636: Performed by: PHYSICIAN ASSISTANT

## 2021-04-26 PROCEDURE — 74177 CT ABD & PELVIS W/CONTRAST: CPT

## 2021-04-26 PROCEDURE — 74011000250 HC RX REV CODE- 250: Performed by: PHYSICIAN ASSISTANT

## 2021-04-26 RX ORDER — BARIUM SULFATE 20 MG/ML
900 SUSPENSION ORAL
Status: COMPLETED | OUTPATIENT
Start: 2021-04-26 | End: 2021-04-26

## 2021-04-26 RX ADMIN — BARIUM SULFATE 900 ML: 21 SUSPENSION ORAL at 07:27

## 2021-04-26 RX ADMIN — IOPAMIDOL 100 ML: 755 INJECTION, SOLUTION INTRAVENOUS at 07:27

## 2021-04-26 NOTE — PROGRESS NOTES
Johnny Zurita, your CT scan shows a hiatal hernia involving your diaphram and the stomach which may be causing some of your symptoms. I would have you see a gastroenterologist for upper endocscopic evaluation to see if the inside of the stomach is causing some of your pains. They also noted significant constipation which could be contributing as well. I would ensure you are drinking plenty of water throughout the day, add fiber (Benefiber or metamucil) and use Miralax as needed to ensure bowel movements at least every 2 days. Please call Dr. Brendan Carver office to have gastroenterology visit set up for further evaluation.   [phone]

## 2021-04-28 ENCOUNTER — OFFICE VISIT (OUTPATIENT)
Dept: INTERNAL MEDICINE CLINIC | Age: 61
End: 2021-04-28
Payer: COMMERCIAL

## 2021-04-28 VITALS
OXYGEN SATURATION: 98 % | HEIGHT: 64 IN | WEIGHT: 226 LBS | TEMPERATURE: 98 F | BODY MASS INDEX: 38.58 KG/M2 | HEART RATE: 62 BPM | SYSTOLIC BLOOD PRESSURE: 158 MMHG | RESPIRATION RATE: 14 BRPM | DIASTOLIC BLOOD PRESSURE: 84 MMHG

## 2021-04-28 DIAGNOSIS — N18.31 STAGE 3A CHRONIC KIDNEY DISEASE (HCC): ICD-10-CM

## 2021-04-28 DIAGNOSIS — E55.9 VITAMIN D DEFICIENCY: ICD-10-CM

## 2021-04-28 DIAGNOSIS — I10 ESSENTIAL HYPERTENSION: ICD-10-CM

## 2021-04-28 DIAGNOSIS — K59.09 CHRONIC CONSTIPATION: ICD-10-CM

## 2021-04-28 DIAGNOSIS — K21.9 GASTROESOPHAGEAL REFLUX DISEASE WITHOUT ESOPHAGITIS: ICD-10-CM

## 2021-04-28 DIAGNOSIS — R10.12 LUQ PAIN: Primary | ICD-10-CM

## 2021-04-28 DIAGNOSIS — E03.9 ACQUIRED HYPOTHYROIDISM: ICD-10-CM

## 2021-04-28 PROCEDURE — 99215 OFFICE O/P EST HI 40 MIN: CPT | Performed by: INTERNAL MEDICINE

## 2021-04-28 NOTE — PATIENT INSTRUCTIONS
Constipation: Care Instructions Your Care Instructions Constipation means that you have a hard time passing stools (bowel movements). People pass stools from 3 times a day to once every 3 days. What is normal for you may be different. Constipation may occur with pain in the rectum and cramping. The pain may get worse when you try to pass stools. Sometimes there are small amounts of bright red blood on toilet paper or the surface of stools. This is because of enlarged veins near the rectum (hemorrhoids). A few changes in your diet and lifestyle may help you avoid ongoing constipation. Your doctor may also prescribe medicine to help loosen your stool. Some medicines can cause constipation. These include pain medicines and antidepressants. Tell your doctor about all the medicines you take. Your doctor may want to make a medicine change to ease your symptoms. Follow-up care is a key part of your treatment and safety. Be sure to make and go to all appointments, and call your doctor if you are having problems. It's also a good idea to know your test results and keep a list of the medicines you take. How can you care for yourself at home? · Drink plenty of fluids. If you have kidney, heart, or liver disease and have to limit fluids, talk with your doctor before you increase the amount of fluids you drink. · Include high-fiber foods in your diet each day. These include fruits, vegetables, beans, and whole grains. · Get at least 30 minutes of exercise on most days of the week. Walking is a good choice. You also may want to do other activities, such as running, swimming, cycling, or playing tennis or team sports. · Take a fiber supplement, such as Citrucel or Metamucil, every day. Read and follow all instructions on the label. · Schedule time each day for a bowel movement. A daily routine may help. Take your time having your bowel movement.  
· Support your feet with a small step stool when you sit on the toilet. This helps flex your hips and places your pelvis in a squatting position. · Your doctor may recommend an over-the-counter laxative to relieve your constipation. Examples are Milk of Magnesia and MiraLax. Read and follow all instructions on the label. Do not use laxatives on a long-term basis. When should you call for help? Call your doctor now or seek immediate medical care if: 
  · You have new or worse belly pain.  
  · You have new or worse nausea or vomiting.  
  · You have blood in your stools. Watch closely for changes in your health, and be sure to contact your doctor if: 
  · Your constipation is getting worse.  
  · You do not get better as expected. Where can you learn more? Go to http://www.gray.com/ Enter 21 523.745.2782 in the search box to learn more about \"Constipation: Care Instructions. \" Current as of: February 26, 2020               Content Version: 12.8 © 3718-9113 [employer]. Care instructions adapted under license by [employer] (which disclaims liability or warranty for this information). If you have questions about a medical condition or this instruction, always ask your healthcare professional. [address] any warranty or liability for your use of this information.

## 2021-04-28 NOTE — PROGRESS NOTES
CC:   Chief Complaint   Patient presents with    Results       HISTORY OF PRESENT ILLNESS  Zoe De La O is a 64 y.o. female. Presents to discuss lab results. She has HTN, CKD stage 3, hypothyroidism, hyperlipidemia, major depression, anxiety disorder with panic attacks, GERD, history of gastric bypass in 2013 with loss of 145 lbs, history of ischemic colitis in 2009, and family history of colon polyps and kidney disease requiring hemodialysis.       Saw Grayson URIAS on 3/30/21 with LUQ abd pain. CT showed small hiatal hernia and fecal stasis. She complains of still having left-sided abd pain. Feels like she has a mass at the left flank. Also complains of chronic constipation. Has BM once every 2 weeks. Has nausea, increased burping, and sour taste in mouth after eating. Mild SOB and occasional chest tightness. Stopped famotidine, now taking Protonix 40 mg daily. Scheduled to see Dr. Anusha Pena NP next week. Last colonoscopy 2 years ago but was not prepped well. Other complaints: Both hands get numb, left upper arm with pulling sensation    The patient has hypertension. Denies HA's, dizziness, heart palpitations, or leg swelling. Home BP monitoring: not done. She stopped losartan for months and just restarted about 3 weeks ago. BP was 182/107 at last clinic visit on 3/30/21. Patient is seen for follow up of hypothyroidism. Thyroid ROS: has cold intolerance, much fatigue, weigh gain, chronic constipation. Denies skin changes or CVS symptoms. Taking medication as prescribed  Last TSH was high (5.070 on 10/3/19).       Patient Active Problem List   Diagnosis Code    Hypertension I10    LVH (left ventricular hypertrophy) due to hypertensive disease I11.9    Venous stasis I87.8    Severe episode of recurrent major depressive disorder, without psychotic features ([facility] [address] F33.2    Panic disorder F41.0    Acquired hypothyroidism E03.9    Vitamin D deficiency E55.9    Chronic insomnia F51.04    Gastroesophageal reflux disease without esophagitis K21.9    History of gastric bypass Z98.84    Tobacco use Z72.0    Obesity (BMI 30-39. 9) E66.9    IGT (impaired glucose tolerance) R73.02    Stage 3 chronic kidney disease (HCC) N18.30    Prediabetes R73.03    Mixed hyperlipidemia E78.2    Chronic pain of both knees M25.561, M25.562, G89.29    Neck pain M54.2    OCD (obsessive compulsive disorder) F42.9    PTSD (post-traumatic stress disorder) F43.10    Severe obesity (HCC) E66.01     Past Medical History:   Diagnosis Date    Anxiety disorder     Arthritis     knee arthritis    Depression     Fatigue     GERD (gastroesophageal reflux disease)     Headache     Heart disease     Hiatal hernia     Hypertension     Ischemic colon ([address] 2009    Kidney disease     Leg swelling     LVH (left ventricular hypertrophy) due to hypertensive disease     dr Angus Grubbs Morbid obesity (HCC)     Postmenopausal     Short of breath on exertion     Thyroid disease     TIA (transient ischemic attack)     Venous stasis     Vertigo      Allergies   Allergen Reactions    Lisinopril Cough    Lortab [Hydrocodone-Acetaminophen] Itching    Morphine Nausea and Vomiting       Current Outpatient Medications   Medication Sig Dispense Refill    pantoprazole (PROTONIX) 40 mg tablet Take 1 Tab by mouth daily. 30 Tab 3    dilTIAZem ER (TIAZAC) 360 mg capsule Take 1 capsule by mouth once daily 90 Cap 1    FLUoxetine 60 mg tab 60 mg.      levothyroxine (SYNTHROID) 100 mcg tablet Take 1 Tab by mouth Daily (before breakfast). 90 Tab 1    hydroCHLOROthiazide (HYDRODIURIL) 25 mg tablet Take 1 Tab by mouth daily. Indications: high blood pressure 90 Tab 1    losartan (COZAAR) 100 mg tablet Take 1 Tab by mouth daily. Indications: high blood pressure 90 Tab 0    cholecalciferol (VITAMIN D3) 5,000 unit capsule Take 1 Cap by mouth daily.  Indications: low vitamin D levels 30 Cap 5    buPROPion XL (WELLBUTRIN XL) 300 mg XL tablet Take 1 Tab by mouth every morning. 90 Tab 3    QUEtiapine (SEROQUEL) 50 mg tablet Take 2 Tabs by mouth nightly. 60 Tab 5    simethicone (GAS-X PO) Take  by mouth.  LORazepam (ATIVAN) 0.5 mg tablet TAKE ONE BY MOUTH DAILY AS NEEDED FOR ANXIETY 30 Tab 0    famotidine (PEPCID) 20 mg tablet Take 20 mg by mouth daily. Buys OTC           PHYSICAL EXAM  Visit Vitals  BP (!) 158/84 (BP 1 Location: Left upper arm, BP Patient Position: Sitting, BP Cuff Size: Large adult)   Pulse 62   Temp 98 °F (36.7 °C) (Oral)   Resp 14   Ht 5' 4\" (1.626 m)   Wt 226 lb (102.5 kg)   SpO2 98%   BMI 38.79 kg/m²       General: Obese, no distress. HEENT:  Head normocephalic/atraumatic, no scleral icterus  Neck: Supple. No JVD, lymphadenopathy, or thyromegaly. Lungs:  Clear to ausculation bilaterally. Good air movement. Heart:  Regular rate and rhythm, normal S1 and S2, no murmur, gallop, or rub  Abdomen: Soft, non-distended, normal bowel sounds, tenderness at LUQ abd, no guarding, masses, rebound tenderness, or HSM. Extremities: No clubbing, cyanosis. Trace bilateral LE edema. Mild decrease in ROM with abduction and ext rotation at L shoulder. Neg Tinel's and Phalen's bilaterally. Neurological: Alert and oriented. Psychiatric: Normal mood and affect. Behavior is normal.     Results for orders placed or performed in visit on 03/30/21   CBC WITH AUTOMATED DIFF   Result Value Ref Range    WBC 6.3 3.4 - 10.8 x10E3/uL    RBC 4.50 3.77 - 5.28 x10E6/uL    HGB 12.1 11.1 - 15.9 g/dL    HCT 36.7 34.0 - 46.6 %    MCV 82 79 - 97 fL    MCH 26.9 26.6 - 33.0 pg    MCHC 33.0 31.5 - 35.7 g/dL    RDW 13.4 11.7 - 15.4 %    PLATELET 465 990 - 429 x10E3/uL    NEUTROPHILS 63 Not Estab. %    Lymphocytes 26 Not Estab. %    MONOCYTES 8 Not Estab. %    EOSINOPHILS 2 Not Estab. %    BASOPHILS 1 Not Estab. %    ABS. NEUTROPHILS 4.0 1.4 - 7.0 x10E3/uL    Abs Lymphocytes 1.6 0.7 - 3.1 x10E3/uL    ABS.  MONOCYTES 0.5 0.1 - 0.9 x10E3/uL    ABS. EOSINOPHILS 0.1 0.0 - 0.4 x10E3/uL    ABS. BASOPHILS 0.0 0.0 - 0.2 x10E3/uL    IMMATURE GRANULOCYTES 0 Not Estab. %    ABS. IMM. GRANS. 0.0 0.0 - 0.1 J13B3/MT   METABOLIC PANEL, COMPREHENSIVE   Result Value Ref Range    Glucose 102 (H) 65 - 99 mg/dL    BUN 20 8 - 27 mg/dL    Creatinine 1.28 (H) 0.57 - 1.00 mg/dL    GFR est non-AA 46 (L) >59 mL/min/1.73    GFR est AA 53 (L) >59 mL/min/1.73    BUN/Creatinine ratio 16 12 - 28    Sodium 142 134 - 144 mmol/L    Potassium 4.5 3.5 - 5.2 mmol/L    Chloride 106 96 - 106 mmol/L    CO2 24 20 - 29 mmol/L    Calcium 9.6 8.7 - 10.3 mg/dL    Protein, total 6.6 6.0 - 8.5 g/dL    Albumin 4.3 3.8 - 4.9 g/dL    GLOBULIN, TOTAL 2.3 1.5 - 4.5 g/dL    A-G Ratio 1.9 1.2 - 2.2    Bilirubin, total <0.2 0.0 - 1.2 mg/dL    Alk. phosphatase 128 (H) 39 - 117 IU/L    AST (SGOT) 16 0 - 40 IU/L    ALT (SGPT) 16 0 - 32 IU/L   LIPID PANEL   Result Value Ref Range    Cholesterol, total 227 (H) 100 - 199 mg/dL    Triglyceride 186 (H) 0 - 149 mg/dL    HDL Cholesterol 67 >39 mg/dL    VLDL, calculated 32 5 - 40 mg/dL    LDL, calculated 128 (H) 0 - 99 mg/dL   HEMOGLOBIN A1C WITH EAG   Result Value Ref Range    Hemoglobin A1c 6.1 (H) 4.8 - 5.6 %    Estimated average glucose 128 mg/dL         ASSESSMENT AND PLAN    ICD-10-CM ICD-9-CM    1. LUQ pain  R10.12 789.02    2. Gastroesophageal reflux disease without esophagitis  K21.9 530.81    3. Chronic constipation  K59.09 564.00 linaCLOtide (Linzess) 72 mcg cap capsule   4. Essential hypertension  I10 401.9    5. Stage 3a chronic kidney disease (HCC)  N18.31 585.3    6. Vitamin D deficiency  E55.9 268.9 VITAMIN D, 25 HYDROXY      VITAMIN D, 25 HYDROXY   7. Acquired hypothyroidism  E03.9 244.9 TSH 3RD GENERATION      T4, FREE      TSH 3RD GENERATION      T4, FREE     Discussed lab results with patient.   Normal liver tests and blood counts.  Unchanged CKD stage 3a.  Prediabetes with A1c 6.1%, worse than it was in June 2019 (5.9%). [facility] cholesterol: tot chol 227 (was 160 in 6/2019),  (was 78 in 6/2019). Calculated 10-yr ASCVD risk 8.5%. Counseled on low-fat diet. Will check TSH before starting any cholesterol medication. Diagnoses and all orders for this visit:    1. LUQ pain  Most likely due to GERD. Continue Protonix. Keep scheduled appointment with Dr. Shawn Cifuentes and his NP. Will likely need EGD and colonoscopy. 2. Gastroesophageal reflux disease without esophagitis  As above. 3. Chronic constipation  -     Start linaCLOtide (Linzess) 72 mcg cap capsule; Take 1 Cap by mouth Daily (before breakfast). For chronic constipation    4. Essential hypertension  Still high at 158/84 but improving. Will make no change today. Continue diltiazem 360 mg daily, losartan 100 mg daily, and HCTZ 25 mg daily. Recheck at next clinic visit. Because of CKD, consider decreasing losartan to 50 mg daily and adding spironolactone or hydralazine at next clinic visit. 5. Stage 3a chronic kidney disease ([facility] [address]    6. Vitamin D deficiency  -     VITAMIN D, 25 HYDROXY; Future    7. Acquired hypothyroidism  Recheck thyroid labs to rule out uncontrolled hypothyroidism since she is symptomatic.  -     TSH 3RD GENERATION; Future  -     T4, FREE; Future    Greater than 40 mins direct face-to-face time spent with patient. Greater than 50% of time spent on counseling and coordination of care. Follow-up and Dispositions    · Return in about 1 month (around 5/28/2021), or if symptoms worsen or fail to improve, for HTN, constipation, abd pain, thyroid. I have discussed the diagnosis with the patient and the intended plan as seen in the above orders. Patient is in agreement. The patient has received an after-visit summary and questions were answered concerning future plans. I have discussed medication side effects and warnings with the patient as well.

## 2021-04-28 NOTE — PROGRESS NOTES
Identified pt with two pt identifiers. Reviewed record in preparation for visit and have obtained necessary documentation. All patient medications has been reviewed. Chief Complaint   Patient presents with    Results     Additional information about chief complaint:    Visit Vitals  BP (!) 158/84 (BP 1 Location: Left upper arm, BP Patient Position: Sitting, BP Cuff Size: Large adult)   Pulse 62   Temp 98 °F (36.7 °C) (Oral)   Resp 14   Ht 5' 4\" (1.626 m)   Wt 226 lb (102.5 kg)   SpO2 98%   BMI 38.79 kg/m²       Health Maintenance Due   Topic    COVID-19 Vaccine (1)    Shingrix Vaccine Age 50> (1 of 2)    PAP AKA CERVICAL CYTOLOGY        1. Have you been to the ER, urgent care clinic since your last visit? Hospitalized since your last visit? No   2. Have you seen or consulted any other health care providers outside of the [address] [place] [address] since your last visit? Include any pap smears or colon screening.   NO   bdg

## 2021-05-07 RX ORDER — DILTIAZEM HYDROCHLORIDE 360 MG/1
CAPSULE, EXTENDED RELEASE ORAL
Qty: 90 CAP | Refills: 0 | Status: SHIPPED | OUTPATIENT
Start: 2021-05-07 | End: 2021-08-10

## 2021-05-16 DIAGNOSIS — I10 ESSENTIAL HYPERTENSION: ICD-10-CM

## 2021-05-17 RX ORDER — LOSARTAN POTASSIUM 100 MG/1
TABLET ORAL
Qty: 90 TAB | Refills: 0 | Status: SHIPPED | OUTPATIENT
Start: 2021-05-17 | End: 2021-06-17

## 2021-07-13 RX ORDER — PANTOPRAZOLE SODIUM 40 MG/1
TABLET, DELAYED RELEASE ORAL
Qty: 30 TABLET | Refills: 0 | Status: SHIPPED | OUTPATIENT
Start: 2021-07-13 | End: 2021-08-10

## 2021-11-04 RX ORDER — PANTOPRAZOLE SODIUM 40 MG/1
TABLET, DELAYED RELEASE ORAL
Qty: 30 TABLET | Refills: 0 | Status: SHIPPED | OUTPATIENT
Start: 2021-11-04 | End: 2021-11-26

## 2021-11-26 RX ORDER — PANTOPRAZOLE SODIUM 40 MG/1
TABLET, DELAYED RELEASE ORAL
Qty: 30 TABLET | Refills: 0 | Status: SHIPPED | OUTPATIENT
Start: 2021-11-26 | End: 2021-12-02 | Stop reason: SDUPTHER

## 2021-12-02 ENCOUNTER — OFFICE VISIT (OUTPATIENT)
Dept: INTERNAL MEDICINE CLINIC | Age: 61
End: 2021-12-02
Payer: COMMERCIAL

## 2021-12-02 VITALS
DIASTOLIC BLOOD PRESSURE: 99 MMHG | BODY MASS INDEX: 37.01 KG/M2 | OXYGEN SATURATION: 93 % | HEIGHT: 64 IN | TEMPERATURE: 98.3 F | RESPIRATION RATE: 16 BRPM | WEIGHT: 216.8 LBS | HEART RATE: 70 BPM | SYSTOLIC BLOOD PRESSURE: 160 MMHG

## 2021-12-02 DIAGNOSIS — F33.2 SEVERE EPISODE OF RECURRENT MAJOR DEPRESSIVE DISORDER, WITHOUT PSYCHOTIC FEATURES (HCC): ICD-10-CM

## 2021-12-02 DIAGNOSIS — Z23 NEEDS FLU SHOT: ICD-10-CM

## 2021-12-02 DIAGNOSIS — I10 PRIMARY HYPERTENSION: Primary | ICD-10-CM

## 2021-12-02 DIAGNOSIS — Z12.31 ENCOUNTER FOR SCREENING MAMMOGRAM FOR MALIGNANT NEOPLASM OF BREAST: ICD-10-CM

## 2021-12-02 DIAGNOSIS — E78.2 MIXED HYPERLIPIDEMIA: ICD-10-CM

## 2021-12-02 DIAGNOSIS — K21.9 GASTROESOPHAGEAL REFLUX DISEASE WITHOUT ESOPHAGITIS: ICD-10-CM

## 2021-12-02 DIAGNOSIS — N18.31 STAGE 3A CHRONIC KIDNEY DISEASE (HCC): ICD-10-CM

## 2021-12-02 DIAGNOSIS — H91.90 HEARING LOSS, UNSPECIFIED HEARING LOSS TYPE, UNSPECIFIED LATERALITY: ICD-10-CM

## 2021-12-02 PROCEDURE — 90686 IIV4 VACC NO PRSV 0.5 ML IM: CPT | Performed by: INTERNAL MEDICINE

## 2021-12-02 PROCEDURE — 90471 IMMUNIZATION ADMIN: CPT | Performed by: INTERNAL MEDICINE

## 2021-12-02 PROCEDURE — 99214 OFFICE O/P EST MOD 30 MIN: CPT | Performed by: INTERNAL MEDICINE

## 2021-12-02 RX ORDER — LOSARTAN POTASSIUM AND HYDROCHLOROTHIAZIDE 25; 100 MG/1; MG/1
1 TABLET ORAL EVERY MORNING
Qty: 90 TABLET | Refills: 3 | Status: SHIPPED | OUTPATIENT
Start: 2021-12-02 | End: 2022-01-11

## 2021-12-02 RX ORDER — SPIRONOLACTONE 25 MG/1
25 TABLET ORAL EVERY MORNING
Qty: 90 TABLET | Refills: 0 | Status: SHIPPED | OUTPATIENT
Start: 2021-12-02 | End: 2022-03-25 | Stop reason: ALTCHOICE

## 2021-12-02 RX ORDER — PANTOPRAZOLE SODIUM 40 MG/1
40 TABLET, DELAYED RELEASE ORAL DAILY
Qty: 90 TABLET | Refills: 2 | Status: SHIPPED | OUTPATIENT
Start: 2021-12-02 | End: 2022-08-31

## 2021-12-02 NOTE — PROGRESS NOTES
CC:   Chief Complaint   Patient presents with    Hypertension     Room 3B //     Constipation    Abdominal Pain    Thyroid Problem    Immunization/Injection       HISTORY OF PRESENT ILLNESS  Andrew Gómez is a 64 y.o. female. Presents for 6 month follow up evaluation. She has HTN, CKD stage 3, hypothyroidism, hyperlipidemia, major depression, anxiety disorder with panic attacks, GERD, history of gastric bypass in  with loss of 145 lbs, history of ischemic colitis in , and family history of colon polyps and kidney disease requiring hemodialysis. Complains of feeling depressed. Reports the holidays are a hard time for her because her son  on 14 in a motorcycle accident. Follows with a psychiatrist, Dr. Azael Garcia [facility]). Last saw in Oct; next appointment in April. Denies SI or HI. See PHQ-9 questionnaire below. Complains of gradually worsening hearing. Denies HA's, dizziness, heart palpitations, or leg swelling. Home BP monitoring: not done. COVID vaccine: had 2/2 Pfizer vaccines    ROS  A complete review of systems was performed and is negative except for those mentioned in the HPI.     3 most recent PHQ Screens 2021   PHQ Not Done -   Little interest or pleasure in doing things Nearly every day   Feeling down, depressed, irritable, or hopeless Nearly every day   Total Score PHQ 2 6   Trouble falling or staying asleep, or sleeping too much Nearly every day   Feeling tired or having little energy Nearly every day   Poor appetite, weight loss, or overeating Nearly every day   Feeling bad about yourself - or that you are a failure or have let yourself or your family down Not at all   Trouble concentrating on things such as school, work, reading, or watching TV Nearly every day   Moving or speaking so slowly that other people could have noticed; or the opposite being so fidgety that others notice Nearly every day   Thoughts of being better off dead, or hurting yourself in some way Not at all   PHQ 9 Score 21   How difficult have these problems made it for you to do your work, take care of your home and get along with others Somewhat difficult       Patient Active Problem List   Diagnosis Code    Hypertension I10    LVH (left ventricular hypertrophy) due to hypertensive disease I11.9    Venous stasis I87.8    Severe episode of recurrent major depressive disorder, without psychotic features (HCC) F33.2    Panic disorder F41.0    Acquired hypothyroidism E03.9    Vitamin D deficiency E55.9    Chronic insomnia F51.04    Gastroesophageal reflux disease without esophagitis K21.9    History of gastric bypass Z98.84    Tobacco use Z72.0    Obesity (BMI 30-39. 9) E66.9    IGT (impaired glucose tolerance) R73.02    Stage 3 chronic kidney disease (HCC) N18.30    Prediabetes R73.03    Mixed hyperlipidemia E78.2    Chronic pain of both knees M25.561, M25.562, G89.29    Neck pain M54.2    OCD (obsessive compulsive disorder) F42.9    PTSD (post-traumatic stress disorder) F43.10    Severe obesity (HCC) E66.01     Past Medical History:   Diagnosis Date    Anxiety disorder     Arthritis     knee arthritis    Depression     Fatigue     GERD (gastroesophageal reflux disease)     Headache     Heart disease     Hiatal hernia     Hypertension     Ischemic colon ([facility] [address] 2009    Kidney disease     Leg swelling     LVH (left ventricular hypertrophy) due to hypertensive disease     dr Edinson Jenkins   [employer] Morbid obesity (HCC)     Postmenopausal     Short of breath on exertion     Thyroid disease     TIA (transient ischemic attack)     Venous stasis     Vertigo      Allergies   Allergen Reactions    Lisinopril Cough    Lortab [Hydrocodone-Acetaminophen] Itching    Morphine Nausea and Vomiting       Current Outpatient Medications   Medication Sig Dispense Refill    pantoprazole (PROTONIX) 40 mg tablet Take 1 tablet by mouth once daily 30 Tablet 0    levothyroxine (SYNTHROID) 100 mcg tablet TAKE 1 TABLET BY MOUTH ONCE DAILY BEFORE BREAKFAST 30 Tablet 0    dilTIAZem ER (TIAZAC) 360 mg capsule Take 1 Capsule by mouth daily. 90 Capsule 3    losartan (COZAAR) 100 mg tablet TAKE 1 TABLET BY MOUTH ONCE DAILY FOR HIGH BLOOD PRESSURE 90 Tablet 1    linaCLOtide (Linzess) 72 mcg cap capsule Take 1 Cap by mouth Daily (before breakfast). For chronic constipation 30 Cap 1    FLUoxetine 60 mg tab 60 mg.      hydroCHLOROthiazide (HYDRODIURIL) 25 mg tablet Take 1 Tab by mouth daily. Indications: high blood pressure 90 Tab 1    buPROPion XL (WELLBUTRIN XL) 300 mg XL tablet Take 1 Tab by mouth every morning. 90 Tab 3    QUEtiapine (SEROQUEL) 50 mg tablet Take 2 Tabs by mouth nightly. 60 Tab 5    simethicone (GAS-X PO) Take  by mouth.  LORazepam (ATIVAN) 0.5 mg tablet TAKE ONE BY MOUTH DAILY AS NEEDED FOR ANXIETY 30 Tab 0         PHYSICAL EXAM  Visit Vitals  BP (!) 160/99 (BP 1 Location: Left upper arm, BP Patient Position: Sitting, BP Cuff Size: Large adult)   Pulse 70   Temp 98.3 °F (36.8 °C) (Oral)   Resp 16   Ht 5' 4\" (1.626 m)   Wt 216 lb 12.8 oz (98.3 kg)   SpO2 93%   BMI 37.21 kg/m²       General: Well-developed and well-nourished, no distress. HEENT:  Head normocephalic/atraumatic, no scleral icterus  Lungs:  Clear to ausculation bilaterally. Good air movement. Heart:  Regular rate and rhythm, normal S1 and S2, no murmur, gallop, or rub  Extremities: No clubbing, cyanosis, or edema. Neurological: Alert and oriented. Psychiatric: Normal mood and affect. Behavior is normal.         ASSESSMENT AND PLAN    ICD-10-CM ICD-9-CM    1. Primary hypertension  I10 401.9 losartan-hydroCHLOROthiazide (HYZAAR) 100-25 mg per tablet      spironolactone (ALDACTONE) 25 mg tablet   2. Stage 3a chronic kidney disease (HCC)  N18.31 585.3    3. Mixed hyperlipidemia  E78.2 272.2    4. Severe episode of recurrent major depressive disorder, without psychotic features ([facility] [address]  F33.2 296.33    5. Hearing loss, unspecified hearing loss type, unspecified laterality  H91.90 389.9    6. Gastroesophageal reflux disease without esophagitis  K21.9 530.81 pantoprazole (PROTONIX) 40 mg tablet   7. Encounter for screening mammogram for malignant neoplasm of breast  Z12.31 V76.12 [facility] MAMMO BI SCREENING INCL CAD   8. Needs flu shot  Z23 V04.81 INFLUENZA VIRUS VAC QUAD,SPLIT,PRESV FREE SYRINGE IM     Diagnoses and all orders for this visit:    1. Primary hypertension  Not controlled. Continue losartan 100 mg, HCTZ 25 mg daily, and diltiazem (takes at night). Will combine the first 2 and add spironolactone. -     Start losartan-hydroCHLOROthiazide (HYZAAR) 100-25 mg per tablet; Take 1 Tablet by mouth Every morning.  -     Start spironolactone (ALDACTONE) 25 mg tablet; Take 1 Tablet by mouth Every morning. 2. Stage 3a chronic kidney disease ([facility] [address]    3. Mixed hyperlipidemia    4. Severe episode of recurrent major depressive disorder, without psychotic features ([facility] [address]  Continue following with Dr. Geovany Alatorre. 5. Hearing loss, unspecified hearing loss type, unspecified laterality  Patient Instructions  To have hearing tested, call: [facility] [place] [facility], [address], [place], [address] [facility], phone: [phone]    [facility], [address], [place], [address] [facility], phone: [phone]    6. Gastroesophageal reflux disease without esophagitis  -     Refill pantoprazole (PROTONIX) 40 mg tablet; Take 1 Tablet by mouth daily. 7. Encounter for screening mammogram for malignant neoplasm of breast  -     [facility] MAMMO BI SCREENING INCL CAD; Future    8. Needs flu shot  -     INFLUENZA VIRUS VAC QUAD,SPLIT,PRESV FREE SYRINGE IM      Follow-up and Dispositions    · Return in about 2 months (around 2/2/2022), or if symptoms worsen or fail to improve, for HTN; have fasting labs done within next 1 week.            I have discussed the diagnosis with the patient and the intended plan as seen in the above orders. Patient is in agreement. The patient has received an after-visit summary and questions were answered concerning future plans. I have discussed medication side effects and warnings with the patient as well.

## 2021-12-02 NOTE — PATIENT INSTRUCTIONS
Vaccine Information Statement    Influenza (Flu) Vaccine (Inactivated or Recombinant): What You Need to Know    Many vaccine information statements are available in [place] and other languages. See www.immunize.org/vis. Hojas de información sobre vacunas están disponibles en español y en muchos otros idiomas. Visite www.immunize.org/vis. 1. Why get vaccinated? Influenza vaccine can prevent influenza (flu). Flu is a contagious disease that spreads around the [employer] every year, usually between October and May. Anyone can get the flu, but it is more dangerous for some people. Infants and young children, people 72 years and older, pregnant people, and people with certain health conditions or a weakened immune system are at greatest risk of flu complications. Pneumonia, bronchitis, sinus infections, and ear infections are examples of flu-related complications. If you have a medical condition, such as heart disease, cancer, or diabetes, flu can make it worse. Flu can cause fever and chills, sore throat, muscle aches, fatigue, cough, headache, and runny or stuffy nose. Some people may have vomiting and diarrhea, though this is more common in children than adults. In an average year, thousands of people in the [facility] die from flu, and many more are hospitalized. Flu vaccine prevents millions of illnesses and flu-related visits to the doctor each year. 2. Influenza vaccines     CDC recommends everyone 6 months and older get vaccinated every flu season. Children 6 months through 6years of age may need 2 doses during a single flu season. Everyone else needs only 1 dose each flu season. It takes about 2 weeks for protection to develop after vaccination. There are many flu viruses, and they are always changing. Each year a new flu vaccine is made to protect against the influenza viruses believed to be likely to cause disease in the upcoming flu season.  Even when the vaccine doesnt exactly match these viruses, it may still provide some protection. Influenza vaccine does not cause flu. Influenza vaccine may be given at the same time as other vaccines. 3. Talk with your health care provider    Tell your vaccination provider if the person getting the vaccine:   Has had an allergic reaction after a previous dose of influenza vaccine, or has any severe, life-threatening allergies    Has ever had Guillain-Barré Syndrome (also called GBS)    In some cases, your health care provider may decide to postpone influenza vaccination until a future visit. Influenza vaccine can be administered at any time during pregnancy. People who are or will be pregnant during influenza season should receive inactivated influenza vaccine. People with minor illnesses, such as a cold, may be vaccinated. People who are moderately or severely ill should usually wait until they recover before getting influenza vaccine. Your health care provider can give you more information. 4. Risks of a vaccine reaction     Soreness, redness, and swelling where the shot is given, fever, muscle aches, and headache can happen after influenza vaccination.  There may be a very small increased risk of Guillain-Barré Syndrome (GBS) after inactivated influenza vaccine (the flu shot). Leoma March children who get the flu shot along with pneumococcal vaccine (PCV13) and/or DTaP vaccine at the same time might be slightly more likely to have a seizure caused by fever. Tell your health care provider if a child who is getting flu vaccine has ever had a seizure. People sometimes faint after medical procedures, including vaccination. Tell your provider if you feel dizzy or have vision changes or ringing in the ears. As with any medicine, there is a very remote chance of a vaccine causing a severe allergic reaction, other serious injury, or death. 5. What if there is a serious problem?     An allergic reaction could occur after the vaccinated person leaves the clinic. If you see signs of a severe allergic reaction (hives, swelling of the face and throat, difficulty breathing, a fast heartbeat, dizziness, or weakness), call 9-1-1 and get the person to the nearest hospital.    For other signs that concern you, call your health care provider. Adverse reactions should be reported to the Vaccine Adverse Event Reporting System (VAERS). Your health care provider will usually file this report, or you can do it yourself. Visit the VAERS website at www.vaers. [facility].gov or call 8-907.925.5081. VAERS is only for reporting reactions, and VAERS staff members do not give medical advice. 6. The National Vaccine Injury Compensation Program    The [facility] Vaccine Injury Compensation Program (VICP) is a federal program that was created to compensate people who may have been injured by certain vaccines. Claims regarding alleged injury or death due to vaccination have a time limit for filing, which may be as short as two years. Visit the VICP website at www.[facility].gov/vaccinecompensation or call 5-782.640.6696 to learn about the program and about filing a claim. 7. How can I learn more?  Ask your health care provider.  Call your local or state health department.  Visit the website of the Food and Drug Administration (FDA) for vaccine package inserts and additional information at www.fda.gov/vaccines-blood-biologics/vaccines.  Contact the Centers for Disease Control and Prevention (CDC):  - Call 9-377.166.2011 (1-800-CDC-INFO) or  - Visit CDCs influenza website at www.cdc.gov/flu. Vaccine Information Statement   Inactivated Influenza Vaccine   8/6/2021  42 VIVIAN Leonila Anthony [id number]   Department of Health and Human Services  Centers for Disease Control and Prevention    Office Use Only      Patient Instructions  To have hearing tested, call:     [facility], [facility], [address], [address], [place], [address], phone: [phone]    [facility], [address] [facility], [facility] [address], [place], [address], phone: [phone]

## 2021-12-29 LAB — SARS-COV-2, NAA: POSITIVE

## 2022-01-04 ENCOUNTER — APPOINTMENT (OUTPATIENT)
Dept: CT IMAGING | Age: 62
DRG: 177 | End: 2022-01-04
Attending: STUDENT IN AN ORGANIZED HEALTH CARE EDUCATION/TRAINING PROGRAM
Payer: COMMERCIAL

## 2022-01-04 ENCOUNTER — HOSPITAL ENCOUNTER (INPATIENT)
Age: 62
LOS: 7 days | Discharge: HOME | DRG: 177 | End: 2022-01-11
Attending: EMERGENCY MEDICINE | Admitting: INTERNAL MEDICINE
Payer: COMMERCIAL

## 2022-01-04 ENCOUNTER — APPOINTMENT (OUTPATIENT)
Dept: GENERAL RADIOLOGY | Age: 62
DRG: 177 | End: 2022-01-04
Attending: STUDENT IN AN ORGANIZED HEALTH CARE EDUCATION/TRAINING PROGRAM
Payer: COMMERCIAL

## 2022-01-04 DIAGNOSIS — R09.02 HYPOXIA: ICD-10-CM

## 2022-01-04 DIAGNOSIS — J12.82 PNEUMONIA DUE TO COVID-19 VIRUS: Primary | ICD-10-CM

## 2022-01-04 DIAGNOSIS — U07.1 PNEUMONIA DUE TO COVID-19 VIRUS: Primary | ICD-10-CM

## 2022-01-04 LAB
ALBUMIN SERPL-MCNC: 2.5 G/DL (ref 3.5–5)
ALBUMIN/GLOB SERPL: 0.6 {RATIO} (ref 1.1–2.2)
ALP SERPL-CCNC: 101 U/L (ref 45–117)
ALT SERPL-CCNC: 30 U/L (ref 12–78)
ANION GAP SERPL CALC-SCNC: 11 MMOL/L (ref 5–15)
AST SERPL-CCNC: 50 U/L (ref 15–37)
ATRIAL RATE: 58 BPM
BASOPHILS # BLD: 0 K/UL (ref 0–0.1)
BASOPHILS NFR BLD: 0 % (ref 0–1)
BILIRUB SERPL-MCNC: 0.4 MG/DL (ref 0.2–1)
BUN SERPL-MCNC: 18 MG/DL (ref 6–20)
BUN/CREAT SERPL: 13 (ref 12–20)
CALCIUM SERPL-MCNC: 8.9 MG/DL (ref 8.5–10.1)
CALCULATED P AXIS, ECG09: 57 DEGREES
CALCULATED R AXIS, ECG10: 6 DEGREES
CALCULATED T AXIS, ECG11: 37 DEGREES
CHLORIDE SERPL-SCNC: 108 MMOL/L (ref 97–108)
CO2 SERPL-SCNC: 23 MMOL/L (ref 21–32)
CREAT SERPL-MCNC: 1.41 MG/DL (ref 0.55–1.02)
CRP SERPL-MCNC: 22 MG/DL (ref 0–0.6)
D DIMER PPP FEU-MCNC: 0.78 MG/L FEU (ref 0–0.65)
DIAGNOSIS, 93000: NORMAL
DIFFERENTIAL METHOD BLD: ABNORMAL
EOSINOPHIL # BLD: 0 K/UL (ref 0–0.4)
EOSINOPHIL NFR BLD: 0 % (ref 0–7)
ERYTHROCYTE [DISTWIDTH] IN BLOOD BY AUTOMATED COUNT: 16.4 % (ref 11.5–14.5)
GLOBULIN SER CALC-MCNC: 4.5 G/DL (ref 2–4)
GLUCOSE BLD STRIP.AUTO-MCNC: 144 MG/DL (ref 65–117)
GLUCOSE SERPL-MCNC: 119 MG/DL (ref 65–100)
HCT VFR BLD AUTO: 35.9 % (ref 35–47)
HGB BLD-MCNC: 11.3 G/DL (ref 11.5–16)
IMM GRANULOCYTES # BLD AUTO: 0.1 K/UL (ref 0–0.04)
IMM GRANULOCYTES NFR BLD AUTO: 1 % (ref 0–0.5)
LYMPHOCYTES # BLD: 0.7 K/UL (ref 0.8–3.5)
LYMPHOCYTES NFR BLD: 11 % (ref 12–49)
MCH RBC QN AUTO: 24.5 PG (ref 26–34)
MCHC RBC AUTO-ENTMCNC: 31.5 G/DL (ref 30–36.5)
MCV RBC AUTO: 77.9 FL (ref 80–99)
MONOCYTES # BLD: 0.3 K/UL (ref 0–1)
MONOCYTES NFR BLD: 5 % (ref 5–13)
NEUTS SEG # BLD: 5 K/UL (ref 1.8–8)
NEUTS SEG NFR BLD: 83 % (ref 32–75)
NRBC # BLD: 0 K/UL (ref 0–0.01)
NRBC BLD-RTO: 0 PER 100 WBC
P-R INTERVAL, ECG05: 158 MS
PLATELET # BLD AUTO: 244 K/UL (ref 150–400)
PMV BLD AUTO: 10.3 FL (ref 8.9–12.9)
POTASSIUM SERPL-SCNC: 3.2 MMOL/L (ref 3.5–5.1)
PROT SERPL-MCNC: 7 G/DL (ref 6.4–8.2)
Q-T INTERVAL, ECG07: 450 MS
QRS DURATION, ECG06: 88 MS
QTC CALCULATION (BEZET), ECG08: 441 MS
RBC # BLD AUTO: 4.61 M/UL (ref 3.8–5.2)
RBC MORPH BLD: ABNORMAL
RBC MORPH BLD: ABNORMAL
SARS-COV-2, COV2: NORMAL
SERVICE CMNT-IMP: ABNORMAL
SODIUM SERPL-SCNC: 142 MMOL/L (ref 136–145)
TROPONIN-HIGH SENSITIVITY: 17 NG/L (ref 0–51)
VENTRICULAR RATE, ECG03: 58 BPM
WBC # BLD AUTO: 6.1 K/UL (ref 3.6–11)

## 2022-01-04 PROCEDURE — 93005 ELECTROCARDIOGRAM TRACING: CPT

## 2022-01-04 PROCEDURE — 74011000258 HC RX REV CODE- 258: Performed by: INTERNAL MEDICINE

## 2022-01-04 PROCEDURE — 51798 US URINE CAPACITY MEASURE: CPT

## 2022-01-04 PROCEDURE — 65660000000 HC RM CCU STEPDOWN

## 2022-01-04 PROCEDURE — 74011250636 HC RX REV CODE- 250/636: Performed by: STUDENT IN AN ORGANIZED HEALTH CARE EDUCATION/TRAINING PROGRAM

## 2022-01-04 PROCEDURE — 71275 CT ANGIOGRAPHY CHEST: CPT

## 2022-01-04 PROCEDURE — 36415 COLL VENOUS BLD VENIPUNCTURE: CPT

## 2022-01-04 PROCEDURE — 74011000250 HC RX REV CODE- 250: Performed by: INTERNAL MEDICINE

## 2022-01-04 PROCEDURE — U0005 INFEC AGEN DETEC AMPLI PROBE: HCPCS

## 2022-01-04 PROCEDURE — 3E0333Z INTRODUCTION OF ANTI-INFLAMMATORY INTO PERIPHERAL VEIN, PERCUTANEOUS APPROACH: ICD-10-PCS | Performed by: INTERNAL MEDICINE

## 2022-01-04 PROCEDURE — 82962 GLUCOSE BLOOD TEST: CPT

## 2022-01-04 PROCEDURE — 94761 N-INVAS EAR/PLS OXIMETRY MLT: CPT

## 2022-01-04 PROCEDURE — 85025 COMPLETE CBC W/AUTO DIFF WBC: CPT

## 2022-01-04 PROCEDURE — 96374 THER/PROPH/DIAG INJ IV PUSH: CPT

## 2022-01-04 PROCEDURE — 85379 FIBRIN DEGRADATION QUANT: CPT

## 2022-01-04 PROCEDURE — 80053 COMPREHEN METABOLIC PANEL: CPT

## 2022-01-04 PROCEDURE — 86140 C-REACTIVE PROTEIN: CPT

## 2022-01-04 PROCEDURE — 74011250637 HC RX REV CODE- 250/637: Performed by: INTERNAL MEDICINE

## 2022-01-04 PROCEDURE — 99285 EMERGENCY DEPT VISIT HI MDM: CPT

## 2022-01-04 PROCEDURE — 71045 X-RAY EXAM CHEST 1 VIEW: CPT

## 2022-01-04 PROCEDURE — 74011250636 HC RX REV CODE- 250/636: Performed by: INTERNAL MEDICINE

## 2022-01-04 PROCEDURE — 74011000636 HC RX REV CODE- 636: Performed by: EMERGENCY MEDICINE

## 2022-01-04 PROCEDURE — 84484 ASSAY OF TROPONIN QUANT: CPT

## 2022-01-04 RX ORDER — HYDRALAZINE HYDROCHLORIDE 20 MG/ML
20 INJECTION INTRAMUSCULAR; INTRAVENOUS
Status: DISCONTINUED | OUTPATIENT
Start: 2022-01-04 | End: 2022-01-11 | Stop reason: HOSPADM

## 2022-01-04 RX ORDER — SODIUM CHLORIDE 0.9 % (FLUSH) 0.9 %
5-40 SYRINGE (ML) INJECTION EVERY 8 HOURS
Status: DISCONTINUED | OUTPATIENT
Start: 2022-01-04 | End: 2022-01-11 | Stop reason: HOSPADM

## 2022-01-04 RX ORDER — SODIUM CHLORIDE 0.9 % (FLUSH) 0.9 %
5-40 SYRINGE (ML) INJECTION AS NEEDED
Status: DISCONTINUED | OUTPATIENT
Start: 2022-01-04 | End: 2022-01-11 | Stop reason: HOSPADM

## 2022-01-04 RX ORDER — INSULIN LISPRO 100 [IU]/ML
INJECTION, SOLUTION INTRAVENOUS; SUBCUTANEOUS
Status: DISCONTINUED | OUTPATIENT
Start: 2022-01-04 | End: 2022-01-11 | Stop reason: HOSPADM

## 2022-01-04 RX ORDER — POTASSIUM CHLORIDE 20 MEQ/1
40 TABLET, EXTENDED RELEASE ORAL
Status: COMPLETED | OUTPATIENT
Start: 2022-01-04 | End: 2022-01-04

## 2022-01-04 RX ORDER — ACETAMINOPHEN 650 MG/1
650 SUPPOSITORY RECTAL
Status: DISCONTINUED | OUTPATIENT
Start: 2022-01-04 | End: 2022-01-11 | Stop reason: HOSPADM

## 2022-01-04 RX ORDER — ENOXAPARIN SODIUM 100 MG/ML
40 INJECTION SUBCUTANEOUS DAILY
Status: DISCONTINUED | OUTPATIENT
Start: 2022-01-05 | End: 2022-01-11 | Stop reason: HOSPADM

## 2022-01-04 RX ORDER — FLUOXETINE HYDROCHLORIDE 20 MG/1
40 CAPSULE ORAL DAILY
Status: DISCONTINUED | OUTPATIENT
Start: 2022-01-05 | End: 2022-01-11 | Stop reason: HOSPADM

## 2022-01-04 RX ORDER — LOSARTAN POTASSIUM 100 MG/1
100 TABLET ORAL DAILY
Status: DISCONTINUED | OUTPATIENT
Start: 2022-01-05 | End: 2022-01-11 | Stop reason: HOSPADM

## 2022-01-04 RX ORDER — ACETAMINOPHEN 325 MG/1
650 TABLET ORAL
Status: DISCONTINUED | OUTPATIENT
Start: 2022-01-04 | End: 2022-01-11 | Stop reason: HOSPADM

## 2022-01-04 RX ORDER — POLYETHYLENE GLYCOL 3350 17 G/17G
17 POWDER, FOR SOLUTION ORAL DAILY
Status: DISCONTINUED | OUTPATIENT
Start: 2022-01-05 | End: 2022-01-11 | Stop reason: HOSPADM

## 2022-01-04 RX ORDER — QUETIAPINE FUMARATE 25 MG/1
100 TABLET, FILM COATED ORAL
Status: DISCONTINUED | OUTPATIENT
Start: 2022-01-04 | End: 2022-01-11 | Stop reason: HOSPADM

## 2022-01-04 RX ORDER — LEVOTHYROXINE SODIUM 100 UG/1
100 TABLET ORAL
Status: DISCONTINUED | OUTPATIENT
Start: 2022-01-05 | End: 2022-01-11 | Stop reason: HOSPADM

## 2022-01-04 RX ORDER — PROMETHAZINE HYDROCHLORIDE 25 MG/1
12.5 TABLET ORAL
Status: DISCONTINUED | OUTPATIENT
Start: 2022-01-04 | End: 2022-01-11 | Stop reason: HOSPADM

## 2022-01-04 RX ORDER — ONDANSETRON 2 MG/ML
4 INJECTION INTRAMUSCULAR; INTRAVENOUS
Status: DISCONTINUED | OUTPATIENT
Start: 2022-01-04 | End: 2022-01-11 | Stop reason: HOSPADM

## 2022-01-04 RX ORDER — MAGNESIUM SULFATE 100 %
4 CRYSTALS MISCELLANEOUS AS NEEDED
Status: DISCONTINUED | OUTPATIENT
Start: 2022-01-04 | End: 2022-01-11 | Stop reason: HOSPADM

## 2022-01-04 RX ORDER — PANTOPRAZOLE SODIUM 40 MG/1
40 TABLET, DELAYED RELEASE ORAL DAILY
Status: DISCONTINUED | OUTPATIENT
Start: 2022-01-05 | End: 2022-01-11 | Stop reason: HOSPADM

## 2022-01-04 RX ORDER — DILTIAZEM HYDROCHLORIDE 180 MG/1
360 CAPSULE, EXTENDED RELEASE ORAL DAILY
Status: DISCONTINUED | OUTPATIENT
Start: 2022-01-05 | End: 2022-01-04

## 2022-01-04 RX ORDER — DEXAMETHASONE SODIUM PHOSPHATE 10 MG/ML
10 INJECTION INTRAMUSCULAR; INTRAVENOUS EVERY 24 HOURS
Status: DISCONTINUED | OUTPATIENT
Start: 2022-01-09 | End: 2022-01-11

## 2022-01-04 RX ORDER — BISACODYL 5 MG
5 TABLET, DELAYED RELEASE (ENTERIC COATED) ORAL DAILY PRN
Status: DISCONTINUED | OUTPATIENT
Start: 2022-01-04 | End: 2022-01-11 | Stop reason: HOSPADM

## 2022-01-04 RX ORDER — BUPROPION HYDROCHLORIDE 150 MG/1
300 TABLET ORAL
Status: DISCONTINUED | OUTPATIENT
Start: 2022-01-05 | End: 2022-01-11 | Stop reason: HOSPADM

## 2022-01-04 RX ORDER — LORAZEPAM 0.5 MG/1
0.5 TABLET ORAL
Status: DISCONTINUED | OUTPATIENT
Start: 2022-01-04 | End: 2022-01-11 | Stop reason: HOSPADM

## 2022-01-04 RX ORDER — DEXAMETHASONE SODIUM PHOSPHATE 4 MG/ML
6 INJECTION, SOLUTION INTRA-ARTICULAR; INTRALESIONAL; INTRAMUSCULAR; INTRAVENOUS; SOFT TISSUE ONCE
Status: COMPLETED | OUTPATIENT
Start: 2022-01-04 | End: 2022-01-04

## 2022-01-04 RX ORDER — DILTIAZEM HYDROCHLORIDE 180 MG/1
360 CAPSULE, COATED, EXTENDED RELEASE ORAL DAILY
Status: DISCONTINUED | OUTPATIENT
Start: 2022-01-05 | End: 2022-01-11 | Stop reason: HOSPADM

## 2022-01-04 RX ORDER — DEXTROSE 50 % IN WATER (D50W) INTRAVENOUS SYRINGE
25-50 AS NEEDED
Status: DISCONTINUED | OUTPATIENT
Start: 2022-01-04 | End: 2022-01-11 | Stop reason: HOSPADM

## 2022-01-04 RX ADMIN — DEXAMETHASONE SODIUM PHOSPHATE 6 MG: 4 INJECTION INTRA-ARTICULAR; INTRALESIONAL; INTRAMUSCULAR; INTRAVENOUS; SOFT TISSUE at 11:38

## 2022-01-04 RX ADMIN — ACETAMINOPHEN 650 MG: 325 TABLET ORAL at 17:09

## 2022-01-04 RX ADMIN — POTASSIUM CHLORIDE 40 MEQ: 1500 TABLET, EXTENDED RELEASE ORAL at 19:30

## 2022-01-04 RX ADMIN — SODIUM CHLORIDE, PRESERVATIVE FREE 10 ML: 5 INJECTION INTRAVENOUS at 22:00

## 2022-01-04 RX ADMIN — QUETIAPINE FUMARATE 100 MG: 25 TABLET ORAL at 22:36

## 2022-01-04 RX ADMIN — DEXAMETHASONE SODIUM PHOSPHATE 20 MG: 4 INJECTION, SOLUTION INTRAMUSCULAR; INTRAVENOUS at 20:13

## 2022-01-04 NOTE — H&P
Hospitalist Admission Note    NAME:  Gela Mckeon   :   1960   MRN:   [id number]     Date of admit: 2022    PCP: Santosh Guerra MD    Assessment/Plan:           Hospitalist Admission Note    NAME:  Gela Mckeon   :   1960   MRN:   [id number]     Date of admit: 2022    PCP: Santosh Guerra MD    Assessment/Plan:     COVID-19 pneumonia POA causing  Acute respiratory failure with hypoxia POA currently on 15 liters mid flow  Presents with 10 days increasing cough and BUSTOS   Diagnosed at [facility] 1 week ago  Came to emergency room with acutely worsening shortness of breath  Vaccinated x 2 in Sept-Oct 2021, no booster  CXR with Multilobar patchy airspace disease compatible with Covid pneumonia. CTA chest reviewed, bilateral ASD, no PE  O2 sats on RA were in 80s on RA with EMS, RR 28 , Dyspnea  COVID-19 test  Admit to tele or stepdown  O2 to maintain sats > 90%  Currently on L NC o2  IV dexamethasone 20 mg  --> 10 mg  Start tocilizumab or Baricitinib with increased oxygenation  IV remdesevir not indicated, > 7 days since onset  Check inflammatory markers and procalcitoni  Pulmonary consult in AM  Spoke with  by phone re: diagnosis, risk for decompensation, treatment plan    Prediabetes POA last hemoglobin A1c 6.1 in 2021  Not currently on treatment  At risk for hyperglycemia on steroids  Sliding scale insulin, check blood sugars  Add scheduled insulin with steroids if hyperglycemic  Check hemoglobin A1c    Essential hypertension POA  LVH due to hypertension POA  History of TIA POA  Continue losartan, hold hydrochlorothiazide with elevated creatinine  Hold spironolactone for now  Continue diltiazem ED  Low-dose aspirin    Depression  Continue Seroquel, fluoxetine  Low-dose Ativan as needed  Constipation  Linzess as needed    Hypothyroidism POA  Continue levothyroxine   Check TSH    Obesity POA Body mass index is 36.44 kg/m².     Given the patient's current clinical presentation, I have a high level of concern for decompensation if discharged from the emergency department. My assessment of this patient's clinical condition and my plan of care is as noted above. DVT prophylaxis with lovenox    Code status: full code  NOK:     History     CHIEF COMPLAINT: \"I have been increasingly short of breath for past few days\"    HISTORY OF PRESENT ILLNESS:    63 YO female    Baseline not on home O2    Vaccinated vs COVID-19 x 2 in [address] and October 2021, no booster    Several family members recently with COVID-19 infection    Pt developed cough and increasing BUSTOS around zeny time  Mild sore throat  Prominent cough, sore in left chest with coughing so much  No abdominal pain, N/V, diarrhea  Mild left chest pain  Diagnosed at [facility] with [facility]-19 ~ 7 days ago  Not given treatment  Past few days, worsening BUSTOS  Now SOB even sitting up in bed, past 24 hours not really able to walk due to SOB  EMS was called  EMS sats In 80s, eventually placed on NRFM    ER  Currently on 15 liters mid flow  Much less SOB at rest, still winding moving in bed  RR to 28  ER sent COVID-test, currently pending  CXR Multilobar patchy airspace disease compatible with Covid pneumonia. CTA chest, I reviewed the scan personally    No pulmonary embolism or aortic aneurysm or dissection. Imaging findings consistent with moderate Covid pneumonia.   Creat 1.41, baseline creatinine 1.2 to 1.3  IV decadron  We were called to admit the patient      Past Medical History:   Diagnosis Date    Anxiety disorder     Arthritis     knee arthritis    Depression     Fatigue     GERD (gastroesophageal reflux disease)     Headache     Heart disease     Hiatal hernia     Hypertension     Ischemic colon ([facility] [address] 2009    Kidney disease     Leg swelling     LVH (left ventricular hypertrophy) due to hypertensive disease     dr Yosi Becker Morbid obesity ([facility] [address]     Postmenopausal     Short of breath on exertion     Thyroid disease     TIA (transient ischemic attack)     Venous stasis     Vertigo         Past Surgical History:   Procedure Laterality Date    COLONOSCOPY,DIAGNOSTIC  12/31/2012    Dr. Gabriel Alva. Int hemorrhoids, repeat in 10 yrs    ENDOSCOPY, COLON, DIAGNOSTIC  03/2009    3/09- repeat q 3 for ischemic colitis    HX CHOLECYSTECTOMY      HX GYN  [id number]    2 c-sections    HX GYN  2009    endometrial ablation    HX LAP GASTRIC BYPASS  03/28/2013    Dr. Natasha Mendoza HX WISDOM TEETH EXTRACTION         Social History     Tobacco Use    Smoking status: Former Smoker     Packs/day: 1.00    Smokeless tobacco: Former User     Quit date: 1/29/2020   Substance Use Topics    Alcohol use: Yes     Comment: wine sometimes        Family History   Problem Relation Age of Onset    Heart Disease Mother         PULM HTN    Stroke Mother     Hypertension Mother     Colon Polyps Mother     Kidney Disease Mother         DIALYSIS PT    Thyroid Disease Mother     Other Mother         BLOOD CLOT HX    Diabetes Father     Hypertension Father     Colon Polyps Father     Heart Disease Father     Psychiatric Disorder Father         ALZEHEIMERS    Diabetes Brother     Hypertension Brother     Kidney Disease Maternal Aunt         DIALYSIS    Kidney Disease Maternal Uncle     Kidney Disease Maternal Grandmother     Heart Disease Maternal Grandmother     Diabetes Paternal Grandfather     Kidney Disease Maternal Aunt         DIALYSIS    Kidney Disease Maternal Uncle         DIALYSIS    Other Son     Colon Polyps Maternal Grandfather         Allergies   Allergen Reactions    Lisinopril Cough    Lortab [Hydrocodone-Acetaminophen] Itching    Morphine Nausea and Vomiting        Prior to Admission medications    Medication Sig Start Date End Date Taking? Authorizing Provider   pantoprazole (PROTONIX) 40 mg tablet Take 1 Tablet by mouth daily.  12/2/21   Latrice Tuttle MD   losartan-hydroCHLOROthiazide (HYZAAR) 100-25 mg per tablet Take 1 Tablet by mouth Every morning. 12/2/21   Edel Lr MD   spironolactone (ALDACTONE) 25 mg tablet Take 1 Tablet by mouth Every morning. 12/2/21   Edel Lr MD   levothyroxine (SYNTHROID) 100 mcg tablet TAKE 1 TABLET BY MOUTH ONCE DAILY BEFORE BREAKFAST 9/9/21   Chela Roberson MD   dilTIAZem ER [facility]) 360 mg capsule Take 1 Capsule by mouth daily. 8/10/21   Edel Lr MD   losartan (COZAAR) 100 mg tablet TAKE 1 TABLET BY MOUTH ONCE DAILY FOR HIGH BLOOD PRESSURE 6/17/21   Edel Lr MD   linaCLOtide (Linzess) 72 mcg cap capsule Take 1 Cap by mouth Daily (before breakfast). For chronic constipation 4/28/21   Edel Lr MD   FLUoxetine 60 mg tab 60 mg. 3/26/20   Provider, Historical   hydroCHLOROthiazide (HYDRODIURIL) 25 mg tablet Take 1 Tab by mouth daily. Indications: high blood pressure 1/31/20   Belinda Joshi MD   buPROPion XL (WELLBUTRIN XL) 300 mg XL tablet Take 1 Tab by mouth every morning. 9/18/19   Edel Lr MD   QUEtiapine (SEROQUEL) 50 mg tablet Take 2 Tabs by mouth nightly. 8/8/19   Edel Lr MD   simethicone (GAS-X PO) Take  by mouth.     Provider, Historical   LORazepam (ATIVAN) 0.5 mg tablet TAKE ONE BY MOUTH DAILY AS NEEDED FOR ANXIETY 6/7/19   Edel Lr MD       Review of symptoms:     POSITIVE= Bold  Negative = not bold  General:  fever, chills, sweats, generalized weakness, weight loss     loss of appetite   Eyes:    blurred vision, eye pain, double vision  ENT:    Coryza, sore throat, trouble swallowing  Respiratory:   cough, sputum, SOB  Cardiology:   chest pain, orthopnea, PND, edema  Gastrointestinal:  abdominal pain, N/V, diarrhea, constipation, melena or BRBPR  Genitourinary:  Urgency, dysuria, hematuria  Muskuloskeletal :  Joint redness, swelling or acute joint pain, myalgias  Hematology:  easy bruising, nose or gum bleeding  Dermatological: rash, ulceration  Endocrine:   Polyuria or polydipsia, heat or hold intolerance  Neurological:  Headache, focal motor or sensory changes     Speech difficulties, memory loss  Psychological: depression, agitation      Objective:   VITALS:    Patient Vitals for the past 24 hrs:   Temp Pulse Resp BP SpO2   22 1742  (!) 59 25 (!) 147/79 93 %   22 1657  (!) 58 20 (!) 142/81 93 %   22 1527  63 25 (!) 157/85 92 %   22 1427  62 28  93 %   22 1208 98.2 °F (36.8 °C)       22 1142  60 25 (!) 144/67 (!) 89 %   22 1057  (!) 58 27 119/67 93 %   22 0957     98 %   22 0952  62  136/67 98 %     Temp (24hrs), Av.2 °F (36.8 °C), Min:98.2 °F (36.8 °C), Max:98.2 °F (36.8 °C)    O2 Flow Rate (L/min): 15 l/min O2 Device: Non-rebreather mask    Wt Readings from Last 12 Encounters:   22 99.3 kg (219 lb)   21 98.3 kg (216 lb 12.8 oz)   21 102.5 kg (226 lb)   21 101.4 kg (223 lb 8 oz)   20 95.7 kg (211 lb)   20 95.7 kg (211 lb)   20 95.7 kg (211 lb)   20 95.7 kg (211 lb)   20 95.2 kg (209 lb 12.8 oz)   20 94.2 kg (207 lb 10.8 oz)   10/03/19 93.4 kg (206 lb)   19 91.6 kg (202 lb)         PHYSICAL EXAM:     I had a face to face encounter and independently examined this patient on 22  as outlined below:    General:    Alert, cooperative in no distress     HEENT: Normocephalic, atraumatic    PERRL,  Sclera no icterus    Nasal mucosa without masses or discharge  Hearing intact to voice    Oropharynx without erythema or exudate  Neck:  No meningismus, trachea midline, no carotid bruits     Thyroid not enlarged, no nodules or tenderness  Lungs:   Few crackles at bases bilaterally. No wheezing    No accessory muscle use or retractions. Heart:   Regular rate and rhythm,  no murmur or gallop. No LE edema  Abdomen:   Soft, non-tender. Not distended.   Bowel sounds normal.     No masses, No Hepatosplenomegaly, No Rebound or guarding  Lymph nodes: No cervical or inguinal SAMEERA  Musculoskeletal:  No Joint swelling, erythema, warmth. No Cyanosis or clubbing  Skin:      No rashes     Not Jaundiced   No nodules or thickening  Neurologic: Alert and oriented X 3, follows commands     Cranial nerves 2 to 12 intact    Symmetric motor strength bilaterally       LAB DATA REVIEWED:    Recent Results (from the past 12 hour(s))   EKG, 12 LEAD, INITIAL    Collection Time: 01/04/22 10:45 AM   Result Value Ref Range    Ventricular Rate 58 BPM    Atrial Rate 58 BPM    P-R Interval 158 ms    QRS Duration 88 ms    Q-T Interval 450 ms    QTC Calculation (Bezet) 441 ms    Calculated P Axis 57 degrees    Calculated R Axis 6 degrees    Calculated T Axis 37 degrees    Diagnosis       Sinus bradycardia  Confirmed by Joe Gayle ([id number]) on 1/4/2022 12:55:59 PM     CBC WITH AUTOMATED DIFF    Collection Time: 01/04/22 10:56 AM   Result Value Ref Range    WBC 6.1 3.6 - 11.0 K/uL    RBC 4.61 3.80 - 5.20 M/uL    HGB 11.3 (L) 11.5 - 16.0 g/dL    HCT 35.9 35.0 - 47.0 %    MCV 77.9 (L) 80.0 - 99.0 FL    MCH 24.5 (L) 26.0 - 34.0 PG    MCHC 31.5 30.0 - 36.5 g/dL    RDW 16.4 (H) 11.5 - 14.5 %    PLATELET 072 601 - 879 K/uL    MPV 10.3 8.9 - 12.9 FL    NRBC 0.0 0  WBC    ABSOLUTE NRBC 0.00 0.00 - 0.01 K/uL    NEUTROPHILS 83 (H) 32 - 75 %    LYMPHOCYTES 11 (L) 12 - 49 %    MONOCYTES 5 5 - 13 %    EOSINOPHILS 0 0 - 7 %    BASOPHILS 0 0 - 1 %    IMMATURE GRANULOCYTES 1 (H) 0.0 - 0.5 %    ABS. NEUTROPHILS 5.0 1.8 - 8.0 K/UL    ABS. LYMPHOCYTES 0.7 (L) 0.8 - 3.5 K/UL    ABS. MONOCYTES 0.3 0.0 - 1.0 K/UL    ABS. EOSINOPHILS 0.0 0.0 - 0.4 K/UL    ABS. BASOPHILS 0.0 0.0 - 0.1 K/UL    ABS. IMM.  GRANS. 0.1 (H) 0.00 - 0.04 K/UL    DF SMEAR SCANNED      RBC COMMENTS ANISOCYTOSIS  1+        RBC COMMENTS MICROCYTOSIS  1+       METABOLIC PANEL, COMPREHENSIVE    Collection Time: 01/04/22 10:56 AM   Result Value Ref Range    Sodium 142 136 - 145 mmol/L    Potassium 3.2 (L) 3.5 - 5.1 mmol/L    Chloride 108 97 - 108 mmol/L CO2 23 21 - 32 mmol/L    Anion gap 11 5 - 15 mmol/L    Glucose 119 (H) 65 - 100 mg/dL    BUN 18 6 - 20 MG/DL    Creatinine 1.41 (H) 0.55 - 1.02 MG/DL    BUN/Creatinine ratio 13 12 - 20      GFR est AA 46 (L) >60 ml/min/1.73m2    GFR est non-AA 38 (L) >60 ml/min/1.73m2    Calcium 8.9 8.5 - 10.1 MG/DL    Bilirubin, total 0.4 0.2 - 1.0 MG/DL    ALT (SGPT) 30 12 - 78 U/L    AST (SGOT) 50 (H) 15 - 37 U/L    Alk. phosphatase 101 45 - 117 U/L    Protein, total 7.0 6.4 - 8.2 g/dL    Albumin 2.5 (L) 3.5 - 5.0 g/dL    Globulin 4.5 (H) 2.0 - 4.0 g/dL    A-G Ratio 0.6 (L) 1.1 - 2.2     TROPONIN-HIGH SENSITIVITY    Collection Time: 01/04/22 10:56 AM   Result Value Ref Range    Troponin-High Sensitivity 17 0 - 51 ng/L   SARS-COV-2    Collection Time: 01/04/22 10:56 AM   Result Value Ref Range    SARS-CoV-2 Please find results under separate order     D DIMER    Collection Time: 01/04/22 10:56 AM   Result Value Ref Range    D-dimer 0.78 (H) 0.00 - 0.65 mg/L FEU       EKG as read by me shows      CT Results  (Last 48 hours)               01/04/22 1227  CTA CHEST W OR W WO CONT Final result    Impression:  There is no pulmonary embolism. There is no aortic aneurysm or dissection. Imaging findings consistent with moderate Covid pneumonia. Incidental findings are as described above. Narrative:  Clinical history: COVID, hypoxia, elevated d-dimer concern for PE   INDICATION:   COVID, hypoxia, elevated d-dimer concern for PE   COMPARISON: None           CONTRAST: 100 ml Isovue 370   TECHNIQUE: CT of the chest with  IV contrast , Isovue-370 is performed. Axial   images from the thoracic inlet to the level of the upper abdomen are obtained. Manual post-processing of the images and coronal reformatting is also performed. CT dose reduction was achieved through use of a standardized protocol tailored   for this examination and automatic exposure control for dose modulation. Multiplanar reformatted imaging was performed. Sagittal and coronal reformatting. 3-D Postprocessing of imaging was performed. 3-D MIP reconstructed images were obtained in the coronal plane. FINDINGS:    There is no pulmonary embolism. There is no aortic aneurysm or dissection. Scattered groundglass and parenchymal opacities are multifocal. Mildly prominent   mediastinal lymph nodes. Hepatic steatosis. Postcholecystectomy. Gastric bypass. There is no pleural or   pericardial effusion. There is no mediastinal, axillary or hilar   lymphadenopathy. The aorta is normal in course and caliber. The proximal   pulmonary arteries are without evidence of filling defects. No lytic or blastic   lesions are identified. The remainder of the upper abdomen visualized is   unremarkable. CTA CHEST W OR W WO CONT    Result Date: 1/4/2022  There is no pulmonary embolism. There is no aortic aneurysm or dissection. Imaging findings consistent with moderate Covid pneumonia. Incidental findings are as described above. XR CHEST PORT    Result Date: 1/4/2022  Multilobar patchy airspace disease compatible with known Covid pneumonia. Overall shallow lung volumes. I saw the patient personally, took a history and did a complete physical exam at the bedside. I performed complex decision making in coming up with a diagnostic and treatment plan for the patient. I reviewed the patient's past medical records, current laboratory and radiology results, and actual Xray films/EKG. I have also discussed this case with the involved ED physician. Care Plan discussed with:    Patient, Family, ED Doc    Risk of deterioration:  High    Total Time Coordinating Admission:     minutes    Total Critical Care Time:         Sadia Mujica MD                                            Overweight  Morbid Obesity POA Body mass index is 36.44 kg/m².     Given the patient's current clinical presentation, I have a high level of concern for decompensation if discharged from the emergency department. My assessment of this patient's clinical condition and my plan of care is as noted above. DVT prophylaxis with     Code status:  NOK:    History     CHIEF COMPLAINT:    HISTORY OF PRESENT ILLNESS:                      Past Medical History:   Diagnosis Date    Anxiety disorder     Arthritis     knee arthritis    Depression     Fatigue     GERD (gastroesophageal reflux disease)     Headache     Heart disease     Hiatal hernia     Hypertension     Ischemic colon ([facility] [address] 2009    Kidney disease     Leg swelling     LVH (left ventricular hypertrophy) due to hypertensive disease     dr Flora Miller    Morbid obesity ([facility] [address]     Postmenopausal     Short of breath on exertion     Thyroid disease     TIA (transient ischemic attack)     Venous stasis     Vertigo         Past Surgical History:   Procedure Laterality Date    COLONOSCOPY,DIAGNOSTIC  12/31/2012    Dr. Rakesh Sims.  Int hemorrhoids, repeat in 10 yrs    ENDOSCOPY, COLON, DIAGNOSTIC  03/2009    3/09- repeat q 3 for ischemic colitis    HX CHOLECYSTECTOMY      HX GYN  8075,4431    2 c-sections    HX GYN  2009    endometrial ablation    HX LAP GASTRIC BYPASS  03/28/2013    Dr. Cristi Yin History     Tobacco Use    Smoking status: Former Smoker     Packs/day: 1.00    Smokeless tobacco: Former User     Quit date: 1/29/2020   Substance Use Topics    Alcohol use: Yes     Comment: wine sometimes        Family History   Problem Relation Age of Onset    Heart Disease Mother         PULM HTN    Stroke Mother     Hypertension Mother     Colon Polyps Mother     Kidney Disease Mother         DIALYSIS PT    Thyroid Disease Mother     Other Mother         BLOOD CLOT HX    Diabetes Father     Hypertension Father     Colon Polyps Father     Heart Disease Father     Psychiatric Disorder Father         ALZEHEIMERS    Diabetes Brother     Hypertension Brother     Kidney Disease Maternal Aunt         DIALYSIS    Kidney Disease Maternal Uncle     Kidney Disease Maternal Grandmother     Heart Disease Maternal Grandmother     Diabetes Paternal Grandfather     Kidney Disease Maternal Aunt         DIALYSIS    Kidney Disease Maternal Uncle         DIALYSIS    Other Son     Colon Polyps Maternal Grandfather         Allergies   Allergen Reactions    Lisinopril Cough    Lortab [Hydrocodone-Acetaminophen] Itching    Morphine Nausea and Vomiting        Prior to Admission medications    Medication Sig Start Date End Date Taking? Authorizing Provider   pantoprazole (PROTONIX) 40 mg tablet Take 1 Tablet by mouth daily. 12/2/21   Janene Aguilar MD   losartan-hydroCHLOROthiazide (HYZAAR) 100-25 mg per tablet Take 1 Tablet by mouth Every morning. 12/2/21   Janene Aguilar MD   spironolactone (ALDACTONE) 25 mg tablet Take 1 Tablet by mouth Every morning. 12/2/21   Janene Aguilar MD   levothyroxine (SYNTHROID) 100 mcg tablet TAKE 1 TABLET BY MOUTH ONCE DAILY BEFORE BREAKFAST 9/9/21   Xiomara Cao MD   dilTIAZem ER [facility]) 360 mg capsule Take 1 Capsule by mouth daily. 8/10/21   Janene Aguilar MD   losartan (COZAAR) 100 mg tablet TAKE 1 TABLET BY MOUTH ONCE DAILY FOR HIGH BLOOD PRESSURE 6/17/21   Janene Aguilar MD   linaCLOtide (Linzess) 72 mcg cap capsule Take 1 Cap by mouth Daily (before breakfast). For chronic constipation 4/28/21   Janene Aguilar MD   FLUoxetine 60 mg tab 60 mg. 3/26/20   Provider, Historical   hydroCHLOROthiazide (HYDRODIURIL) 25 mg tablet Take 1 Tab by mouth daily. Indications: high blood pressure 1/31/20   Flako Joshi MD   buPROPion XL (WELLBUTRIN XL) 300 mg XL tablet Take 1 Tab by mouth every morning. 9/18/19   Janene Aguilar MD   QUEtiapine (SEROQUEL) 50 mg tablet Take 2 Tabs by mouth nightly. 8/8/19   Janene Aguilar MD   simethicone (GAS-X PO) Take  by mouth.     Provider, Historical   LORazepam (ATIVAN) 0.5 mg tablet TAKE ONE BY MOUTH DAILY AS NEEDED FOR ANXIETY 19   Meng Miramontes MD       Review of symptoms:     POSITIVE= Bold  Negative = not bold  General:  fever, chills, sweats, generalized weakness, weight loss     loss of appetite   Eyes:    blurred vision, eye pain, double vision  ENT:    Coryza, sore throat, trouble swallowing  Respiratory:   cough, sputum, SOB  Cardiology:   chest pain, orthopnea, PND, edema  Gastrointestinal:  abdominal pain , N/V, diarrhea, constipation, melena or BRBPR  Genitourinary:  Urgency, dysuria, hematuria  Muskuloskeletal :  Joint redness, swelling or acute joint pain, myalgias  Hematology:  easy bruising, nose or gum bleeding  Dermatological: rash, ulceration  Endocrine:   Polyuria or polydipsia, heat or hold intolerance  Neurological:  Headache, focal motor or sensory changes     Speech difficulties, memory loss  Psychological: depression, agitation      Objective:   VITALS:    Patient Vitals for the past 24 hrs:   Temp Pulse BP SpO2   22 1208 98.2 °F (36.8 °C)      22 0957    98 %   22 0952  62 136/67 98 %     Temp (24hrs), Av.2 °F (36.8 °C), Min:98.2 °F (36.8 °C), Max:98.2 °F (36.8 °C)    O2 Flow Rate (L/min): 15 l/min O2 Device: Non-rebreather mask    Wt Readings from Last 12 Encounters:   22 99.3 kg (219 lb)   21 98.3 kg (216 lb 12.8 oz)   21 102.5 kg (226 lb)   21 101.4 kg (223 lb 8 oz)   20 95.7 kg (211 lb)   20 95.7 kg (211 lb)   20 95.7 kg (211 lb)   20 95.7 kg (211 lb)   20 95.2 kg (209 lb 12.8 oz)   20 94.2 kg (207 lb 10.8 oz)   10/03/19 93.4 kg (206 lb)   19 91.6 kg (202 lb)         PHYSICAL EXAM:     I had a face to face encounter and independently examined this patient on 22  as outlined below:    General:    Alert, cooperative in no distress     HEENT: Normocephalic, atraumatic    PERRL, EOMI  Sclera no icterus    Nasal mucosa without masses or discharge  Hearing intact to voice    Oropharynx without erythema or exudate  No thrush  Pink MM  Neck:  No meningismus, trachea midline, no carotid bruits     Thyroid not enlarged, no nodules or tenderness  Lungs:   Clear to auscultation bilaterally. No wheezing or rales    No accessory muscle use or retractions. Heart:   Regular rate and rhythm,  no murmur or gallop. No LE edema     Dorsal pedis, Posterior tibial and radial pulses 2+ and symmetric  Abdomen:   Soft, non-tender. Not distended. Bowel sounds normal.     No masses, No Hepatosplenomegaly, No Rebound or guarding  Lymph nodes: No cervical or inguinal SAMEERA  Musculoskeletal:  No Joint swelling, erythema, warmth. No Cyanosis or clubbing  Skin:      No rashes  No Ulcers.       Not Jaundiced   No nodules or thickening    Capillary refill normal  Neurologic: Alert and oriented X 3, follows commands     Cranial nerves 2 to 12 intact    Symmetric motor strength bilaterally       LAB DATA REVIEWED:    Recent Results (from the past 12 hour(s))   EKG, 12 LEAD, INITIAL    Collection Time: 01/04/22 10:45 AM   Result Value Ref Range    Ventricular Rate 58 BPM    Atrial Rate 58 BPM    P-R Interval 158 ms    QRS Duration 88 ms    Q-T Interval 450 ms    QTC Calculation (Bezet) 441 ms    Calculated P Axis 57 degrees    Calculated R Axis 6 degrees    Calculated T Axis 37 degrees    Diagnosis       Sinus bradycardia  Confirmed by Coy Biggs ([id number]) on 1/4/2022 12:55:59 PM     CBC WITH AUTOMATED DIFF    Collection Time: 01/04/22 10:56 AM   Result Value Ref Range    WBC 6.1 3.6 - 11.0 K/uL    RBC 4.61 3.80 - 5.20 M/uL    HGB 11.3 (L) 11.5 - 16.0 g/dL    HCT 35.9 35.0 - 47.0 %    MCV 77.9 (L) 80.0 - 99.0 FL    MCH 24.5 (L) 26.0 - 34.0 PG    MCHC 31.5 30.0 - 36.5 g/dL    RDW 16.4 (H) 11.5 - 14.5 %    PLATELET 377 983 - 976 K/uL    MPV 10.3 8.9 - 12.9 FL    NRBC 0.0 0  WBC    ABSOLUTE NRBC 0.00 0.00 - 0.01 K/uL    NEUTROPHILS 83 (H) 32 - 75 %    LYMPHOCYTES 11 (L) 12 - 49 %    MONOCYTES 5 5 - 13 %    EOSINOPHILS 0 0 - 7 %    BASOPHILS 0 0 - 1 %    IMMATURE GRANULOCYTES 1 (H) 0.0 - 0.5 %    ABS. NEUTROPHILS 5.0 1.8 - 8.0 K/UL    ABS. LYMPHOCYTES 0.7 (L) 0.8 - 3.5 K/UL    ABS. MONOCYTES 0.3 0.0 - 1.0 K/UL    ABS. EOSINOPHILS 0.0 0.0 - 0.4 K/UL    ABS. BASOPHILS 0.0 0.0 - 0.1 K/UL    ABS. IMM. GRANS. 0.1 (H) 0.00 - 0.04 K/UL    DF SMEAR SCANNED      RBC COMMENTS ANISOCYTOSIS  1+        RBC COMMENTS MICROCYTOSIS  1+       METABOLIC PANEL, COMPREHENSIVE    Collection Time: 01/04/22 10:56 AM   Result Value Ref Range    Sodium 142 136 - 145 mmol/L    Potassium 3.2 (L) 3.5 - 5.1 mmol/L    Chloride 108 97 - 108 mmol/L    CO2 23 21 - 32 mmol/L    Anion gap 11 5 - 15 mmol/L    Glucose 119 (H) 65 - 100 mg/dL    BUN 18 6 - 20 MG/DL    Creatinine 1.41 (H) 0.55 - 1.02 MG/DL    BUN/Creatinine ratio 13 12 - 20      GFR est AA 46 (L) >60 ml/min/1.73m2    GFR est non-AA 38 (L) >60 ml/min/1.73m2    Calcium 8.9 8.5 - 10.1 MG/DL    Bilirubin, total 0.4 0.2 - 1.0 MG/DL    ALT (SGPT) 30 12 - 78 U/L    AST (SGOT) 50 (H) 15 - 37 U/L    Alk. phosphatase 101 45 - 117 U/L    Protein, total 7.0 6.4 - 8.2 g/dL    Albumin 2.5 (L) 3.5 - 5.0 g/dL    Globulin 4.5 (H) 2.0 - 4.0 g/dL    A-G Ratio 0.6 (L) 1.1 - 2.2     TROPONIN-HIGH SENSITIVITY    Collection Time: 01/04/22 10:56 AM   Result Value Ref Range    Troponin-High Sensitivity 17 0 - 51 ng/L   SARS-COV-2    Collection Time: 01/04/22 10:56 AM   Result Value Ref Range    SARS-CoV-2 Please find results under separate order     D DIMER    Collection Time: 01/04/22 10:56 AM   Result Value Ref Range    D-dimer 0.78 (H) 0.00 - 0.65 mg/L FEU       EKG as read by me shows      CT Results  (Last 48 hours)               01/04/22 1227  CTA CHEST W OR W WO CONT Final result    Impression:  There is no pulmonary embolism. There is no aortic aneurysm or dissection. Imaging findings consistent with moderate Covid pneumonia. Incidental findings are as described above.             Narrative: Clinical history: COVID, hypoxia, elevated d-dimer concern for PE   INDICATION:   COVID, hypoxia, elevated d-dimer concern for PE   COMPARISON: None           CONTRAST: 100 ml Isovue 370   TECHNIQUE: CT of the chest with  IV contrast , Isovue-370 is performed. Axial   images from the thoracic inlet to the level of the upper abdomen are obtained. Manual post-processing of the images and coronal reformatting is also performed. CT dose reduction was achieved through use of a standardized protocol tailored   for this examination and automatic exposure control for dose modulation. Multiplanar reformatted imaging was performed. Sagittal and coronal reformatting. 3-D Postprocessing of imaging was performed. 3-D MIP reconstructed images were obtained in the coronal plane. FINDINGS:    There is no pulmonary embolism. There is no aortic aneurysm or dissection. Scattered groundglass and parenchymal opacities are multifocal. Mildly prominent   mediastinal lymph nodes. Hepatic steatosis. Postcholecystectomy. Gastric bypass. There is no pleural or   pericardial effusion. There is no mediastinal, axillary or hilar   lymphadenopathy. The aorta is normal in course and caliber. The proximal   pulmonary arteries are without evidence of filling defects. No lytic or blastic   lesions are identified. The remainder of the upper abdomen visualized is   unremarkable. CTA CHEST W OR W WO CONT    Result Date: 1/4/2022  There is no pulmonary embolism. There is no aortic aneurysm or dissection. Imaging findings consistent with moderate Covid pneumonia. Incidental findings are as described above. XR CHEST PORT    Result Date: 1/4/2022  Multilobar patchy airspace disease compatible with known Covid pneumonia. Overall shallow lung volumes. I saw the patient personally, took a history and did a complete physical exam at the bedside.  I performed complex decision making in coming up with a diagnostic and treatment plan for the patient. I reviewed the patient's past medical records, current laboratory and radiology results, and actual Xray films/EKG. I have also discussed this case with the involved ED physician.     Care Plan discussed with:    Patient, Family, ED Doc    Risk of deterioration:  High    Total Time Coordinating Admission: 65    minutes    Total Critical Care Time:         Karla Calero MD

## 2022-01-04 NOTE — ED TRIAGE NOTES
Pt arrives via EMS, she tested positive for COVID19 on 12/28, she has been having a sore throat, SOB, cough and difficulty breathing, pt is fully vaccinated, states she was exposed to COVID on [place]

## 2022-01-04 NOTE — ED PROVIDER NOTES
EMERGENCY DEPARTMENT HISTORY AND PHYSICAL EXAM          Date: 1/4/2022  Patient Name: Soledad Garcia  Attending of Record: Dr. Karin Garcia    History of Presenting Illness     Chief Complaint   Patient presents with    Positive For Covid-19     Pt states she has had a sore throat, SOB and coigh since 12/26, she tested positive for COVID on 12/28 , she states is fully vaccinated (she states she is not due for her booster at this time)        History Provided By: Patient    HPI: Soledad Garcia is a 64 y.o. female, history of HTN, hypothyroidism, depression, anxiety, previous smoking hx quit in 2016, COVID dx 7 days ago, p/w dyspnea and hypoxia. Patient reports worsened dyspnea over the last 3 days associated with cough. Denies confusion, hemoptysis, leg swelling. No hx of prior DVT or PE. She does report mild left-sided CP while in the ambulance on the way here; this is nonradiating. She attirbutes this to anixety. She received both doses of the COVID vaccine. EMS noted the patient was hypoxic <90% and required NRB at 15L to increase O2 sats. PCP: Heather Philip MD    There are no other complaints, changes, or physical findings at this time.      Current Facility-Administered Medications   Medication Dose Route Frequency Provider Last Rate Last Admin    iopamidoL (ISOVUE-370) 76 % injection 100 mL  100 mL IntraVENous RAD ONCE Tad MD Homa        sodium chloride (NS) flush 5-40 mL  5-40 mL IntraVENous Q8H Annie Langston MD        sodium chloride (NS) flush 5-40 mL  5-40 mL IntraVENous PRN Gaurang Jordan MD        acetaminophen (TYLENOL) tablet 650 mg  650 mg Oral Q6H PRN Gaurang Jordan MD        Or    acetaminophen (TYLENOL) suppository 650 mg  650 mg Rectal Q6H PRN Gaurang Jordan MD        [START ON 1/5/2022] polyethylene glycol (MIRALAX) packet 17 g  17 g Oral DAILY Anine Langston MD        bisacodyL (DULCOLAX) tablet 5 mg  5 mg Oral DAILY PRN Gaurang Jordan MD  promethazine (PHENERGAN) tablet 12.5 mg  12.5 mg Oral Q6H PRN Giuliana Gifford MD        Or    ondansetron [facility]) injection 4 mg  4 mg IntraVENous Q6H PRN Giuliana Gifford MD        [START ON 1/5/2022] enoxaparin (LOVENOX) injection 40 mg  40 mg SubCUTAneous DAILY Lilia Langston MD         Current Outpatient Medications   Medication Sig Dispense Refill    pantoprazole (PROTONIX) 40 mg tablet Take 1 Tablet by mouth daily. 90 Tablet 2    losartan-hydroCHLOROthiazide (HYZAAR) 100-25 mg per tablet Take 1 Tablet by mouth Every morning. 90 Tablet 3    spironolactone (ALDACTONE) 25 mg tablet Take 1 Tablet by mouth Every morning. 90 Tablet 0    levothyroxine (SYNTHROID) 100 mcg tablet TAKE 1 TABLET BY MOUTH ONCE DAILY BEFORE BREAKFAST 30 Tablet 0    dilTIAZem ER (TIAZAC) 360 mg capsule Take 1 Capsule by mouth daily. 90 Capsule 3    losartan (COZAAR) 100 mg tablet TAKE 1 TABLET BY MOUTH ONCE DAILY FOR HIGH BLOOD PRESSURE 90 Tablet 1    linaCLOtide (Linzess) 72 mcg cap capsule Take 1 Cap by mouth Daily (before breakfast). For chronic constipation 30 Cap 1    FLUoxetine 60 mg tab 60 mg.      hydroCHLOROthiazide (HYDRODIURIL) 25 mg tablet Take 1 Tab by mouth daily. Indications: high blood pressure 90 Tab 1    buPROPion XL (WELLBUTRIN XL) 300 mg XL tablet Take 1 Tab by mouth every morning. 90 Tab 3    QUEtiapine (SEROQUEL) 50 mg tablet Take 2 Tabs by mouth nightly. 60 Tab 5    simethicone (GAS-X PO) Take  by mouth.       LORazepam (ATIVAN) 0.5 mg tablet TAKE ONE BY MOUTH DAILY AS NEEDED FOR ANXIETY 30 Tab 0       Past History     Past Medical History:  Past Medical History:   Diagnosis Date    Anxiety disorder     Arthritis     knee arthritis    Depression     Fatigue     GERD (gastroesophageal reflux disease)     Headache     Heart disease     Hiatal hernia     Hypertension     Ischemic colon ([facility] [address] 2009    Kidney disease     Leg swelling     LVH (left ventricular hypertrophy) due to hypertensive disease     dr Alvaro Ott    Morbid obesity ([address]     Postmenopausal     Short of breath on exertion     Thyroid disease     TIA (transient ischemic attack)     Venous stasis     Vertigo        Past Surgical History:  Past Surgical History:   Procedure Laterality Date    COLONOSCOPY,DIAGNOSTIC  12/31/2012    Dr. Adelfa Soulier. Int hemorrhoids, repeat in 10 yrs    ENDOSCOPY, COLON, DIAGNOSTIC  03/2009    3/09-[facility] repeat q 3 for ischemic colitis    HX CHOLECYSTECTOMY      HX GYN  1945,[id number]    2 c-sections    HX GYN  2009    endometrial ablation    HX LAP GASTRIC BYPASS  03/28/2013    Dr. Cecilia Crews HX WISDOM TEETH EXTRACTION         Family History:  Family History   Problem Relation Age of Onset    Heart Disease Mother         PULM HTN    Stroke Mother     Hypertension Mother     Colon Polyps Mother     Kidney Disease Mother         DIALYSIS PT    Thyroid Disease Mother     Other Mother         BLOOD CLOT HX    Diabetes Father     Hypertension Father     Colon Polyps Father     Heart Disease Father     Psychiatric Disorder Father         ALZEHEIMERS    Diabetes Brother     Hypertension Brother     Kidney Disease Maternal Aunt         DIALYSIS    Kidney Disease Maternal Uncle     Kidney Disease Maternal Grandmother     Heart Disease Maternal Grandmother     Diabetes Paternal Grandfather     Kidney Disease Maternal Aunt         DIALYSIS    Kidney Disease Maternal Uncle         DIALYSIS    Other Son     Colon Polyps Maternal Grandfather        Social History:  Social History     Tobacco Use    Smoking status: Former Smoker     Packs/day: 1.00    Smokeless tobacco: Former User     Quit date: 1/29/2020   Vaping Use    Vaping Use: Never used   Substance Use Topics    Alcohol use: Yes     Comment: wine sometimes    Drug use: No       Allergies:   Allergies   Allergen Reactions    Lisinopril Cough    Lortab [Hydrocodone-Acetaminophen] Itching    Morphine Nausea and Vomiting         Review of Systems   Review of Systems   Constitutional: Positive for fatigue. HENT: Positive for congestion. Respiratory: Positive for cough and shortness of breath. Cardiovascular: Positive for chest pain. Negative for leg swelling. Gastrointestinal: Negative. Genitourinary: Negative. Neurological: Negative. Psychiatric/Behavioral:        Anxiety   All other systems reviewed and are negative. Physical Exam   Physical Exam  Constitutional:       Appearance: Normal appearance. She is obese. HENT:      Head: Normocephalic. Mouth/Throat:      Mouth: Mucous membranes are moist.      Pharynx: Oropharynx is clear. Eyes:      Extraocular Movements: Extraocular movements intact. Conjunctiva/sclera: Conjunctivae normal.   Cardiovascular:      Rate and Rhythm: Normal rate and regular rhythm. Pulses: Normal pulses. Heart sounds: Normal heart sounds. Pulmonary:      Effort: Pulmonary effort is normal.      Breath sounds: Normal breath sounds. No wheezing, rhonchi or rales. Abdominal:      General: Abdomen is flat. Palpations: Abdomen is soft. Musculoskeletal:         General: No swelling or tenderness. Cervical back: Normal range of motion and neck supple. Right lower leg: No edema. Left lower leg: No edema. Skin:     General: Skin is warm and dry. Capillary Refill: Capillary refill takes 2 to 3 seconds. Neurological:      General: No focal deficit present. Mental Status: She is alert and oriented to person, place, and time. Mental status is at baseline.    Psychiatric:         Behavior: Behavior normal.          Diagnostic Study Results     Labs -     Recent Results (from the past 12 hour(s))   EKG, 12 LEAD, INITIAL    Collection Time: 01/04/22 10:45 AM   Result Value Ref Range    Ventricular Rate 58 BPM    Atrial Rate 58 BPM    P-R Interval 158 ms    QRS Duration 88 ms    Q-T Interval 450 ms    QTC Calculation (Bezet) 441 ms    Calculated P Axis 57 degrees    Calculated R Axis 6 degrees    Calculated T Axis 37 degrees    Diagnosis       Sinus bradycardia  Confirmed by Michael Mistry ([id number]) on 1/4/2022 12:55:59 PM     CBC WITH AUTOMATED DIFF    Collection Time: 01/04/22 10:56 AM   Result Value Ref Range    WBC 6.1 3.6 - 11.0 K/uL    RBC 4.61 3.80 - 5.20 M/uL    HGB 11.3 (L) 11.5 - 16.0 g/dL    HCT 35.9 35.0 - 47.0 %    MCV 77.9 (L) 80.0 - 99.0 FL    MCH 24.5 (L) 26.0 - 34.0 PG    MCHC 31.5 30.0 - 36.5 g/dL    RDW 16.4 (H) 11.5 - 14.5 %    PLATELET 625 088 - 337 K/uL    MPV 10.3 8.9 - 12.9 FL    NRBC 0.0 0  WBC    ABSOLUTE NRBC 0.00 0.00 - 0.01 K/uL    NEUTROPHILS 83 (H) 32 - 75 %    LYMPHOCYTES 11 (L) 12 - 49 %    MONOCYTES 5 5 - 13 %    EOSINOPHILS 0 0 - 7 %    BASOPHILS 0 0 - 1 %    IMMATURE GRANULOCYTES 1 (H) 0.0 - 0.5 %    ABS. NEUTROPHILS 5.0 1.8 - 8.0 K/UL    ABS. LYMPHOCYTES 0.7 (L) 0.8 - 3.5 K/UL    ABS. MONOCYTES 0.3 0.0 - 1.0 K/UL    ABS. EOSINOPHILS 0.0 0.0 - 0.4 K/UL    ABS. BASOPHILS 0.0 0.0 - 0.1 K/UL    ABS. IMM. GRANS. 0.1 (H) 0.00 - 0.04 K/UL    DF SMEAR SCANNED      RBC COMMENTS ANISOCYTOSIS  1+        RBC COMMENTS MICROCYTOSIS  1+       METABOLIC PANEL, COMPREHENSIVE    Collection Time: 01/04/22 10:56 AM   Result Value Ref Range    Sodium 142 136 - 145 mmol/L    Potassium 3.2 (L) 3.5 - 5.1 mmol/L    Chloride 108 97 - 108 mmol/L    CO2 23 21 - 32 mmol/L    Anion gap 11 5 - 15 mmol/L    Glucose 119 (H) 65 - 100 mg/dL    BUN 18 6 - 20 MG/DL    Creatinine 1.41 (H) 0.55 - 1.02 MG/DL    BUN/Creatinine ratio 13 12 - 20      GFR est AA 46 (L) >60 ml/min/1.73m2    GFR est non-AA 38 (L) >60 ml/min/1.73m2    Calcium 8.9 8.5 - 10.1 MG/DL    Bilirubin, total 0.4 0.2 - 1.0 MG/DL    ALT (SGPT) 30 12 - 78 U/L    AST (SGOT) 50 (H) 15 - 37 U/L    Alk.  phosphatase 101 45 - 117 U/L    Protein, total 7.0 6.4 - 8.2 g/dL    Albumin 2.5 (L) 3.5 - 5.0 g/dL    Globulin 4.5 (H) 2.0 - 4.0 g/dL    A-G Ratio 0.6 (L) 1.1 - 2.2 TROPONIN-HIGH SENSITIVITY    Collection Time: 01/04/22 10:56 AM   Result Value Ref Range    Troponin-High Sensitivity 17 0 - 51 ng/L   SARS-COV-2    Collection Time: 01/04/22 10:56 AM   Result Value Ref Range    SARS-CoV-2 Please find results under separate order     D DIMER    Collection Time: 01/04/22 10:56 AM   Result Value Ref Range    D-dimer 0.78 (H) 0.00 - 0.65 mg/L FEU       Radiologic Studies -   CTA CHEST W OR W WO CONT   Final Result   There is no pulmonary embolism. There is no aortic aneurysm or dissection. Imaging findings consistent with moderate Covid pneumonia. Incidental findings are as described above. XR CHEST PORT   Final Result   Multilobar patchy airspace disease compatible with known Covid   pneumonia. Overall shallow lung volumes. CT Results  (Last 48 hours)               01/04/22 1227  CTA CHEST W OR W WO CONT Final result    Impression:  There is no pulmonary embolism. There is no aortic aneurysm or dissection. Imaging findings consistent with moderate Covid pneumonia. Incidental findings are as described above. Narrative:  Clinical history: COVID, hypoxia, elevated d-dimer concern for PE   INDICATION:   COVID, hypoxia, elevated d-dimer concern for PE   COMPARISON: None           CONTRAST: 100 ml Isovue 370   TECHNIQUE: CT of the chest with  IV contrast , Isovue-370 is performed. Axial   images from the thoracic inlet to the level of the upper abdomen are obtained. Manual post-processing of the images and coronal reformatting is also performed. CT dose reduction was achieved through use of a standardized protocol tailored   for this examination and automatic exposure control for dose modulation. Multiplanar reformatted imaging was performed. Sagittal and coronal reformatting. 3-D Postprocessing of imaging was performed. 3-D MIP reconstructed images were obtained in the coronal plane. FINDINGS:    There is no pulmonary embolism. There is no aortic aneurysm or dissection. Scattered groundglass and parenchymal opacities are multifocal. Mildly prominent   mediastinal lymph nodes. Hepatic steatosis. Postcholecystectomy. Gastric bypass. There is no pleural or   pericardial effusion. There is no mediastinal, axillary or hilar   lymphadenopathy. The aorta is normal in course and caliber. The proximal   pulmonary arteries are without evidence of filling defects. No lytic or blastic   lesions are identified. The remainder of the upper abdomen visualized is   unremarkable. CXR Results  (Last 48 hours)               01/04/22 1111  XR CHEST PORT Final result    Impression:  Multilobar patchy airspace disease compatible with known Covid   pneumonia. Overall shallow lung volumes. Narrative:  EXAM: XR CHEST PORT       INDICATION: COVID, dyspnea, hypoxia       COMPARISON: None. FINDINGS: A portable AP radiograph of the chest was obtained at 1104 hours. Overall low lung volumes with multilobar patchy opacification. No pneumonia or   pneumothorax. Cardiomediastinal silhouette is unremarkable. No acute osseous abnormality. Medical Decision Making   I am the first provider for this patient. I reviewed the vital signs, available nursing notes, past medical history, past surgical history, family history and social history. Vital Signs-Reviewed the patient's vital signs. Patient Vitals for the past 12 hrs:   Temp Pulse BP SpO2   01/04/22 1208 98.2 °F (36.8 °C)      01/04/22 0957    98 %   01/04/22 0952  62 136/67 98 %       ECG Interpretation: sinus brdycardia    Records Reviewed: Nursing Notes and Old Medical Records    Provider Notes (Medical Decision Making):   DDx: Presentation is concerning for AHRF 2/2 COVID-19 infection. Pneumonia is possible, however patient has been afebrile and there are no obvious findings on auscultation of the lungs. She reports mild left-sided CP.  Must consider ACS, however patient attirbutes it to anxiety. Pulmonary embolism less likely in the absence of VTE sxs, DVT hx, hemoptysis, cancer, or pleuritic CP. Currently O2 sat in mid-90s% and patient demonstrates no significant resp distress on NRB. Thomas get basic labs, trop, dimer, EKG, and CXR. Will administer decadron and planning on admission. ED Course and Progress Notes:   Initial assessment performed. The patients presenting problems have been discussed, and they are in agreement with the care plan formulated and outlined with them. I have encouraged them to ask questions as they arise throughout their visit. ED Course as of 01/04/22 1448   Tue Jan 04, 2022   1101 Updated patient on x-ray delay. Patient requesting food. Regular tray ordered. [TL]   1102 Spoke to RT, will transition to HFNC and reassess. [BD]   1146 XR shows evidence of COVID PNA. D-dimer elevated to 0.78, will get CTA to ro PE. O2 sat 92% on 10 L mid-flow nc. Placing admit to Medicine now. [BD]   1330 Patient admitted to Medicine. [BD]      ED Course User Index  [BD] Alexei Oliva MD  [TL] Elie Kasper MD     I personally performed a history and physical examination of the patient and discussed the management with the resident including for patient's hypoxia. I have found the following on physical exam:    Awake, alert, moderate distress  And repeat  No JVD  Increased work of breathing  Oxygen 96% on nonrebreather mask at 15 L/min  Handling secretions without difficulty, moving all extremities, alert and oriented    I have reviewed the resident's note and agree with the resident's findings, including all diagnostic interpretations, treatment and plan of care including oxygen support, except as documented below. I was present during the key portions of separately billed procedures.     Concetta Eason MD     CRITICAL CARE NOTE :    2:51 PM      IMPENDING DETERIORATION -respiratory    ASSOCIATED RISK FACTORS - Hypoxia    MANAGEMENT- Bedside Assessment and Supervision of Care    INTERPRETATION -  Xrays, CT Scan, ECG, Blood Pressure and Cardiac Output Measures     INTERVENTIONS -high flow nasal cannula oxygen for acute hypoxia in the 70s on room air    CASE REVIEW - Hospitalist/Intensivist and Nursing    TREATMENT RESPONSE -Stable    PERFORMED BY - Self        NOTES   :      I have spent 38 minutes of critical care time involved in lab review, consultations with specialist, family decision- making, bedside attention and documentation. During this entire length of time I was immediately available to the patient . Agueda Garcia MD            Diagnosis     Clinical Impression:   1. Pneumonia due to COVID-19 virus    2.  Hypoxia        Disposition:  Admission to Medicine          Resident Signature: Deonte Roldan MD

## 2022-01-05 LAB
ALBUMIN SERPL-MCNC: 2.2 G/DL (ref 3.5–5)
ALBUMIN/GLOB SERPL: 0.5 {RATIO} (ref 1.1–2.2)
ALP SERPL-CCNC: 93 U/L (ref 45–117)
ALT SERPL-CCNC: 26 U/L (ref 12–78)
ANION GAP SERPL CALC-SCNC: 8 MMOL/L (ref 5–15)
AST SERPL-CCNC: 34 U/L (ref 15–37)
BASOPHILS # BLD: 0 K/UL (ref 0–0.1)
BASOPHILS NFR BLD: 0 % (ref 0–1)
BILIRUB SERPL-MCNC: 0.4 MG/DL (ref 0.2–1)
BUN SERPL-MCNC: 24 MG/DL (ref 6–20)
BUN/CREAT SERPL: 17 (ref 12–20)
CALCIUM SERPL-MCNC: 8.8 MG/DL (ref 8.5–10.1)
CHLORIDE SERPL-SCNC: 110 MMOL/L (ref 97–108)
CO2 SERPL-SCNC: 22 MMOL/L (ref 21–32)
CREAT SERPL-MCNC: 1.45 MG/DL (ref 0.55–1.02)
DIFFERENTIAL METHOD BLD: ABNORMAL
EOSINOPHIL # BLD: 0 K/UL (ref 0–0.4)
EOSINOPHIL NFR BLD: 0 % (ref 0–7)
ERYTHROCYTE [DISTWIDTH] IN BLOOD BY AUTOMATED COUNT: 16.5 % (ref 11.5–14.5)
GLOBULIN SER CALC-MCNC: 4.4 G/DL (ref 2–4)
GLUCOSE BLD STRIP.AUTO-MCNC: 114 MG/DL (ref 65–117)
GLUCOSE BLD STRIP.AUTO-MCNC: 114 MG/DL (ref 65–117)
GLUCOSE BLD STRIP.AUTO-MCNC: 141 MG/DL (ref 65–117)
GLUCOSE SERPL-MCNC: 153 MG/DL (ref 65–100)
HCT VFR BLD AUTO: 32.1 % (ref 35–47)
HGB BLD-MCNC: 10.1 G/DL (ref 11.5–16)
IMM GRANULOCYTES # BLD AUTO: 0 K/UL (ref 0–0.04)
IMM GRANULOCYTES NFR BLD AUTO: 1 % (ref 0–0.5)
LYMPHOCYTES # BLD: 0.5 K/UL (ref 0.8–3.5)
LYMPHOCYTES NFR BLD: 10 % (ref 12–49)
MCH RBC QN AUTO: 24.5 PG (ref 26–34)
MCHC RBC AUTO-ENTMCNC: 31.5 G/DL (ref 30–36.5)
MCV RBC AUTO: 77.7 FL (ref 80–99)
MONOCYTES # BLD: 0.2 K/UL (ref 0–1)
MONOCYTES NFR BLD: 5 % (ref 5–13)
NEUTS SEG # BLD: 3.8 K/UL (ref 1.8–8)
NEUTS SEG NFR BLD: 84 % (ref 32–75)
NRBC # BLD: 0 K/UL (ref 0–0.01)
NRBC BLD-RTO: 0 PER 100 WBC
PLATELET # BLD AUTO: 254 K/UL (ref 150–400)
PMV BLD AUTO: 9.6 FL (ref 8.9–12.9)
POTASSIUM SERPL-SCNC: 3.9 MMOL/L (ref 3.5–5.1)
PROT SERPL-MCNC: 6.6 G/DL (ref 6.4–8.2)
RBC # BLD AUTO: 4.13 M/UL (ref 3.8–5.2)
SARS-COV-2, XPLCVT: DETECTED
SERVICE CMNT-IMP: ABNORMAL
SERVICE CMNT-IMP: NORMAL
SERVICE CMNT-IMP: NORMAL
SODIUM SERPL-SCNC: 140 MMOL/L (ref 136–145)
SOURCE, COVRS: ABNORMAL
WBC # BLD AUTO: 4.6 K/UL (ref 3.6–11)

## 2022-01-05 PROCEDURE — 97161 PT EVAL LOW COMPLEX 20 MIN: CPT

## 2022-01-05 PROCEDURE — 74011636637 HC RX REV CODE- 636/637: Performed by: INTERNAL MEDICINE

## 2022-01-05 PROCEDURE — 80053 COMPREHEN METABOLIC PANEL: CPT

## 2022-01-05 PROCEDURE — 82962 GLUCOSE BLOOD TEST: CPT

## 2022-01-05 PROCEDURE — 74011000250 HC RX REV CODE- 250: Performed by: INTERNAL MEDICINE

## 2022-01-05 PROCEDURE — 74011250636 HC RX REV CODE- 250/636: Performed by: INTERNAL MEDICINE

## 2022-01-05 PROCEDURE — 94640 AIRWAY INHALATION TREATMENT: CPT

## 2022-01-05 PROCEDURE — 74011250637 HC RX REV CODE- 250/637: Performed by: INTERNAL MEDICINE

## 2022-01-05 PROCEDURE — 65660000000 HC RM CCU STEPDOWN

## 2022-01-05 PROCEDURE — 77010033711 HC HIGH FLOW OXYGEN

## 2022-01-05 PROCEDURE — 97116 GAIT TRAINING THERAPY: CPT

## 2022-01-05 PROCEDURE — 36415 COLL VENOUS BLD VENIPUNCTURE: CPT

## 2022-01-05 PROCEDURE — 97165 OT EVAL LOW COMPLEX 30 MIN: CPT

## 2022-01-05 PROCEDURE — 74011000258 HC RX REV CODE- 258: Performed by: INTERNAL MEDICINE

## 2022-01-05 PROCEDURE — 85025 COMPLETE CBC W/AUTO DIFF WBC: CPT

## 2022-01-05 PROCEDURE — XW033H5 INTRODUCTION OF TOCILIZUMAB INTO PERIPHERAL VEIN, PERCUTANEOUS APPROACH, NEW TECHNOLOGY GROUP 5: ICD-10-PCS | Performed by: INTERNAL MEDICINE

## 2022-01-05 PROCEDURE — 97530 THERAPEUTIC ACTIVITIES: CPT

## 2022-01-05 RX ORDER — BUDESONIDE AND FORMOTEROL FUMARATE DIHYDRATE 80; 4.5 UG/1; UG/1
2 AEROSOL RESPIRATORY (INHALATION)
Status: DISCONTINUED | OUTPATIENT
Start: 2022-01-05 | End: 2022-01-11 | Stop reason: HOSPADM

## 2022-01-05 RX ORDER — MELATONIN
1000 DAILY
Status: DISCONTINUED | OUTPATIENT
Start: 2022-01-05 | End: 2022-01-11 | Stop reason: HOSPADM

## 2022-01-05 RX ORDER — ZINC SULFATE 50(220)MG
1 CAPSULE ORAL DAILY
Status: DISCONTINUED | OUTPATIENT
Start: 2022-01-05 | End: 2022-01-11 | Stop reason: HOSPADM

## 2022-01-05 RX ORDER — GUAIFENESIN 600 MG/1
600 TABLET, EXTENDED RELEASE ORAL 2 TIMES DAILY
Status: DISCONTINUED | OUTPATIENT
Start: 2022-01-05 | End: 2022-01-11 | Stop reason: HOSPADM

## 2022-01-05 RX ORDER — ASCORBIC ACID 500 MG
1000 TABLET ORAL DAILY
Status: DISCONTINUED | OUTPATIENT
Start: 2022-01-05 | End: 2022-01-11 | Stop reason: HOSPADM

## 2022-01-05 RX ADMIN — QUETIAPINE FUMARATE 100 MG: 25 TABLET ORAL at 21:41

## 2022-01-05 RX ADMIN — FLUOXETINE 40 MG: 20 CAPSULE ORAL at 08:07

## 2022-01-05 RX ADMIN — BUDESONIDE AND FORMOTEROL FUMARATE DIHYDRATE 2 PUFF: 80; 4.5 AEROSOL RESPIRATORY (INHALATION) at 11:45

## 2022-01-05 RX ADMIN — SODIUM CHLORIDE, PRESERVATIVE FREE 10 ML: 5 INJECTION INTRAVENOUS at 04:57

## 2022-01-05 RX ADMIN — GUAIFENESIN 600 MG: 600 TABLET, EXTENDED RELEASE ORAL at 17:19

## 2022-01-05 RX ADMIN — BUPROPION HYDROCHLORIDE 300 MG: 150 TABLET, EXTENDED RELEASE ORAL at 08:07

## 2022-01-05 RX ADMIN — Medication 1 CAPSULE: at 09:15

## 2022-01-05 RX ADMIN — OXYCODONE HYDROCHLORIDE AND ACETAMINOPHEN 1000 MG: 500 TABLET ORAL at 09:15

## 2022-01-05 RX ADMIN — Medication 2 UNITS: at 17:19

## 2022-01-05 RX ADMIN — SODIUM CHLORIDE, PRESERVATIVE FREE 10 ML: 5 INJECTION INTRAVENOUS at 21:41

## 2022-01-05 RX ADMIN — ENOXAPARIN SODIUM 40 MG: 100 INJECTION SUBCUTANEOUS at 08:07

## 2022-01-05 RX ADMIN — TOCILIZUMAB 810 MG: 180 INJECTION, SOLUTION SUBCUTANEOUS at 02:22

## 2022-01-05 RX ADMIN — LOSARTAN POTASSIUM 100 MG: 100 TABLET, FILM COATED ORAL at 08:07

## 2022-01-05 RX ADMIN — LORAZEPAM 0.5 MG: 0.5 TABLET ORAL at 04:56

## 2022-01-05 RX ADMIN — GUAIFENESIN 600 MG: 600 TABLET, EXTENDED RELEASE ORAL at 09:15

## 2022-01-05 RX ADMIN — SODIUM CHLORIDE, PRESERVATIVE FREE 10 ML: 5 INJECTION INTRAVENOUS at 15:01

## 2022-01-05 RX ADMIN — BUDESONIDE AND FORMOTEROL FUMARATE DIHYDRATE 2 PUFF: 80; 4.5 AEROSOL RESPIRATORY (INHALATION) at 20:27

## 2022-01-05 RX ADMIN — DEXAMETHASONE SODIUM PHOSPHATE 20 MG: 4 INJECTION, SOLUTION INTRAMUSCULAR; INTRAVENOUS at 22:02

## 2022-01-05 RX ADMIN — LEVOTHYROXINE SODIUM 100 MCG: 0.1 TABLET ORAL at 08:07

## 2022-01-05 RX ADMIN — PANTOPRAZOLE SODIUM 40 MG: 40 TABLET, DELAYED RELEASE ORAL at 08:07

## 2022-01-05 RX ADMIN — Medication 1000 UNITS: at 09:15

## 2022-01-05 RX ADMIN — DILTIAZEM HYDROCHLORIDE 360 MG: 180 CAPSULE, COATED, EXTENDED RELEASE ORAL at 08:07

## 2022-01-05 NOTE — PROGRESS NOTES
Problem: Mobility Impaired (Adult and Pediatric)  Goal: *Acute Goals and Plan of Care (Insert Text)  Description: FUNCTIONAL STATUS PRIOR TO ADMISSION: Patient ambulated indep in community and was driving    [address]: Patient was indep with ADLs    Physical Therapy Goals  Initiated 1/5/2022  1. Patient will move from supine to sit and sit to supine  in bed with modified independence within 7 day(s). 2.  Patient will transfer from bed to chair and chair to bed with modified independence using the least restrictive device within 7 day(s). 3.  Patient will perform sit to stand with modified independence within 7 day(s). 4.  Patient will ambulate with modified independence for 100 feet with the least restrictive device within 7 day(s). Outcome: Not Met   PHYSICAL THERAPY EVALUATION  Patient: Nikita Hi (13 y.o. female)  Date: 1/5/2022  Primary Diagnosis: Pneumonia due to COVID-19 virus [U07.1, J12.82]        Precautions: covid +       ASSESSMENT  Based on the objective data described below, the patient presents with decreased tolerance of activity requiring high level of supplemental 02, general weakness, and impaired functional mobility following admission for covid virus. Patient received in bed and agreeable to participate, on 40 liters  heated high flow with sats in mid 90s. Patient was able to come to sit EOB with slow pace and sitting balance is intact, then stood and took a few slow steps to chair. Sats decreased to 88% but patient able to recover quickly once seated  using PLB. Educated on pacing and progression of activity, safety and falls prevention and left up in recliner with call bell in reach. Patient is expected to be able to discharge home and may benefit from HHPT depending on hospital LOS. Current Level of Function Impacting Discharge (mobility/balance): SBA to chair    Functional Outcome Measure:   The patient scored 55/100  on the Barthel  outcome measure which is indicative of partially dependent level of function      Other factors to consider for discharge: 02 requirements     Patient will benefit from skilled therapy intervention to address the above noted impairments. PLAN :  Recommendations and Planned Interventions: bed mobility training, transfer training, gait training, therapeutic exercises, patient and family training/education and therapeutic activities      Frequency/Duration: Patient will be followed by physical therapy:  3 times a week to address goals. Recommendation for discharge: (in order for the patient to meet his/her long term goals)  To be determined: HHPT? This discharge recommendation:  Has been made in collaboration with the attending provider and/or case management    IF patient discharges home will need the following DME: to be determined (TBD)         SUBJECTIVE:   Patient stated I'd like to sit up.     OBJECTIVE DATA SUMMARY:   HISTORY:    Past Medical History:   Diagnosis Date    Anxiety disorder     Arthritis     knee arthritis    Depression     Fatigue     GERD (gastroesophageal reflux disease)     Headache     Heart disease     Hiatal hernia     Hypertension     Ischemic colon ([facility] [address] [address] 2009    Kidney disease     Leg swelling     LVH (left ventricular hypertrophy) due to hypertensive disease     dr Jorge Vallejo    Morbid obesity ([facility] [address] [facility]     Postmenopausal     Short of breath on exertion     Thyroid disease     TIA (transient ischemic attack)     Venous stasis     Vertigo      Past Surgical History:   Procedure Laterality Date    COLONOSCOPY,DIAGNOSTIC  12/31/2012    Dr. Sheeba Elise.  Int hemorrhoids, repeat in 10 yrs    ENDOSCOPY, COLON, DIAGNOSTIC  03/2009    3/09- repeat q 3 for ischemic colitis    HX CHOLECYSTECTOMY      HX GYN  [id number]    2 c-sections    HX GYN  2009    endometrial ablation    HX LAP GASTRIC BYPASS  03/28/2013    Dr. Bud Medina factors and/or comorbidities impacting plan of care: multiple co-morbidities    Home Situation  Home Environment: Private residence  # Steps to Enter: 4  Rails to Enter: Yes  Hand Rails : Right  One/Two Story Residence: One story  Living Alone: No  Support Systems: Spouse/Significant Other  Patient Expects to be Discharged to[de-identified] House  Current DME Used/Available at Home: None  Tub or Shower Type: Tub/Shower combination    EXAMINATION/PRESENTATION/DECISION MAKING:   Critical Behavior:  Neurologic State: Alert  Orientation Level: Oriented X4  Cognition: Follows commands,Appropriate decision making  Safety/Judgement: Awareness of environment,Home safety,Insight into deficits  Hearing: Auditory  Auditory Impairment: None  Range Of Motion:  AROM: Generally decreased, functional           PROM: Within functional limits           Strength:    Strength: Generally decreased, functional                    Tone & Sensation:   Tone: Normal              Sensation: Intact               Coordination:  Coordination: Generally decreased, functional  Vision:      Functional Mobility:  Bed Mobility:  Rolling: Supervision  Supine to Sit: Supervision        Transfers:  Sit to Stand: Contact guard assistance  Stand to Sit: Contact guard assistance        Bed to Chair: Contact guard assistance              Balance:   Sitting: Intact  Standing: Impaired; With support  Standing - Static: Good  Standing - Dynamic : Good;Fair;Constant support  Ambulation/Gait Training:  Distance (ft): 4 Feet (ft)     Ambulation - Level of Assistance: Stand-by assistance        Gait Abnormalities: Decreased step clearance              Speed/Nati: Pace decreased (<100 feet/min)  Step Length: Left shortened;Right shortened                     Stairs:                Functional Measure:  Barthel Index:    Bathin  Bladder: 10  Bowels: 10  Groomin  Dressin  Feeding: 10  Mobility: 0  Stairs: 0  Toilet Use: 5  Transfer (Bed to Chair and Back): 10  Total: 55/100       The Barthel ADL Index: Guidelines  1. The index should be used as a record of what a patient does, not as a record of what a patient could do. 2. The main aim is to establish degree of independence from any help, physical or verbal, however minor and for whatever reason. 3. The need for supervision renders the patient not independent. 4. A patient's performance should be established using the best available evidence. Asking the patient, friends/relatives and nurses are the usual sources, but direct observation and common sense are also important. However direct testing is not needed. 5. Usually the patient's performance over the preceding 24-48 hours is important, but occasionally longer periods will be relevant. 6. Middle categories imply that the patient supplies over 50 per cent of the effort. 7. Use of aids to be independent is allowed. Score Interpretation (from [address] [address])    Independent   60-79 Minimally independent   40-59 Partially dependent   20-39 Very dependent   <20 Totally dependent     -Lia Melendez., Barthel, D.W. (1965). Functional evaluation: the Barthel Index. [facility] ([address] (1997). The Barthel activities of daily living index: self-reporting versus actual performance in the old (> or = 75 years). Journal of [address] [place] [address] 45(7), 14 [facility], J.J.M.F, Byronabraham Robertson., Ramsey Situ. (1999). Measuring the change in disability after inpatient rehabilitation; comparison of the responsiveness of the Barthel Index and Functional [place] Measure. Journal of Neurology, Neurosurgery, and Psychiatry, 66(4), 900-987. CANDY Hebert.SYEDA, MIMI Uribe, & Franklin Mahmood MAmbikaA. (2004) Assessment of post-stroke quality of life in cost-effectiveness studies: The usefulness of the Barthel Index and the EuroQoL-5D.  Quality of Life Research, 15, 217-57          Physical Therapy Evaluation Charge Determination   History Examination Presentation Decision-Making   MEDIUM  Complexity : 1-2 comorbidities / personal factors will impact the outcome/ POC  LOW Complexity : 1-2 Standardized tests and measures addressing body structure, function, activity limitation and / or participation in recreation  LOW Complexity : Stable, uncomplicated  LOW Complexity : FOTO score of       Based on the above components, the patient evaluation is determined to be of the following complexity level: LOW     Pain Rating:      Activity Tolerance:   Fair, desaturates with exertion and requires oxygen, requires rest breaks and observed SOB with activity    After treatment patient left in no apparent distress:   Sitting in chair and Call bell within reach    COMMUNICATION/EDUCATION:   The patients plan of care was discussed with: Occupational therapist and Registered nurse. Fall prevention education was provided and the patient/caregiver indicated understanding. and Patient/family agree to work toward stated goals and plan of care.     Thank you for this referral.  Nhi Espitia, PT   Time Calculation: 26 mins

## 2022-01-05 NOTE — PROGRESS NOTES
01/05/22 0804   Vitals   Temp 98.8 °F (37.1 °C)   Temp Source Oral   Pulse (Heart Rate) (!) 47   Heart Rate Source Monitor   Resp Rate 26   O2 Sat (%) 94 %   Level of Consciousness Alert (0)   /66   MAP (Calculated) 82   BP 1 Location Right upper arm   BP 1 Method Automatic   BP Patient Position At rest   Cardiac Rhythm Sinus Pj   MEWS Score 3   Pain 1   Pain Scale 1 Numeric (0 - 10)   Pain Intensity 1 0   POSS Scale   Opioid Sedation Scale 1   Oxygen Therapy   O2 Device Heated; Hi flow nasal cannula   O2 Flow Rate (L/min) 40 l/min   FIO2 (%) 93 %   MEWS elevated d/t tachypnea and bradycardia. Will monitor.

## 2022-01-05 NOTE — PROGRESS NOTES
OCCUPATIONAL THERAPY EVALUATION/DISCHARGE  Patient: Gela Mckeon (22 y.o. female)  Date: 1/5/2022  Primary Diagnosis: Pneumonia due to COVID-19 virus [U07.1, J12.82]       Precautions:  Covid+    ASSESSMENT  Based on the objective data described below, the patient presents with decreased higher level mobility/balance, decreased activity tolerance, decreased higher level strength and decreased respiratory status (evaluated on 40L HHF); however, she is demosntrating a high level of safe functional independence, completing self-feeding with [place], seated grooming and UB dressing with S/U, toileting, without DME, at CGA and bathing and LE dressing with Min A and extended time. Pt with good understanding of energy conservation and PLB, with pt's 02 noted to briefly drop to 88% during functional mobility; however, with seated rest break and PLB, 02 noted to increase >93%. Pt reports living with , with good PRN ADL/IADL assist.  As mentioned above, pt with good understanding of energy conservation, with ADL/IADL self-pacing handout provided and good understanding verbalized. Given pt's current high level of safe functional independence, recommended level of ADL assist available within home, no DME needs noted and physical therapy following mobility/balance deficits, no other acute OT needs identified, with OT answering pt's questions/concerns and pt thanking therapist for his efforts. Current Level of Function (ADLs/self-care): Min A    Functional Outcome Measure: The patient scored 55/100 on the Barthel Index outcome measure. Other factors to consider for discharge: Covid+, 02 needs     PLAN :  Recommend with staff: OOB meals, CGA to/from [facility], Active ADL engagement    Recommendation for discharge: (in order for the patient to meet his/her long term goals)  No skilled occupational therapy/ follow up rehabilitation needs identified at this time.     This discharge recommendation:  Has not yet been discussed the attending provider and/or case management    IF patient discharges home will need the following DME: none       SUBJECTIVE:   Patient stated I'm going to wash up here in a little bit, I'll take my time with it.     OBJECTIVE DATA SUMMARY:   HISTORY:   Past Medical History:   Diagnosis Date    Anxiety disorder     Arthritis     knee arthritis    Depression     Fatigue     GERD (gastroesophageal reflux disease)     Headache     Heart disease     Hiatal hernia     Hypertension     Ischem[facility] colon ([facility] [address] 2009    Kidney disease     Leg swelling     LVH (left ventricular hypertrophy) due to hypertensive disease     dr Yosi Deal    Morbid obesity ([facility] [address]     Postmenopausal     Short of breath on exertion     Thyroid disease     TIA (transient ischemic attack)     Venous stasis     Vertigo      Past Surgical History:   Procedure Laterality Date    COLONOSCOPY,DIAGNOSTIC  12/31/2012    Dr. Bolivar Ribeiro. Int hemorrhoids, repeat in 10 yrs    ENDOSCOPY, COLON, DIAGNOSTIC  03/2009    3/09- repeat q 3 for ischemic colitis    HX CHOLECYSTECTOMY      HX GYN  [id number]    2 c-sections    HX GYN  2009    endometrial ablation    HX LAP GASTRIC BYPASS  03/28/2013    Dr. Damian Ramirez         Prior Level of Function/Environment/Context: Independent  Expanded or extensive additional review of patient history:   Home Situation  Home Environment: Private residence  # Steps to Enter: 4  Rails to Enter: Yes  Hand Rails : Right  One/Two Story Residence: One story  Living Alone: No  Support Systems: Spouse/Significant Other  Patient Expects to be Discharged to[de-identified] House  Current DME Used/Available at Home: None  Tub or Shower Type: Tub/Shower combination    Hand dominance: Right    EXAMINATION OF PERFORMANCE DEFICITS:  Cognitive/Behavioral Status:  Neurologic State: Alert  Orientation Level: Oriented X4  Cognition: Follows commands; Appropriate decision making  Perception: Appears intact  Perseveration: No perseveration noted  Safety/Judgement: Awareness of environment;Home safety; Insight into deficits    Hearing: Auditory  Auditory Impairment: None    Range of Motion:  AROM: Generally decreased, functional  PROM: Within functional limits                      Strength:  Strength: Generally decreased, functional                Coordination:  Coordination: Generally decreased, functional  Fine Motor Skills-Upper: Left Intact; Right Intact    Gross Motor Skills-Upper: Left Intact; Right Intact    Tone & Sensation:  Tone: Normal  Sensation: Intact                      Balance:  Sitting: Intact  Standing: Impaired; With support  Standing - Static: Good  Standing - Dynamic : Good;Fair;Constant support    Functional Mobility and Transfers for ADLs:  Bed Mobility:  Rolling: Supervision  Supine to Sit: Supervision    Transfers:  Sit to Stand: Contact guard assistance  Stand to Sit: Contact guard assistance  Bed to Chair: Contact guard assistance    ADL Assessment:  Feeding: Independent    Oral Facial Hygiene/Grooming: Setup    Bathing: Additional time;Minimum assistance    Upper Body Dressing: Setup    Lower Body Dressing: Minimum assistance    Toileting: Contact guard assistance    ADL Intervention and task modifications:  Pt educated on safe transfer techniques, with specific emphasis on proper hand placement to push up from seated surface rather than attempt to pull self up, fully positioning self in-front of desired seated location, feeling chair on back of legs and reaching back with 1-2 UE to slowly lower self to seated position. Provided verbal cuing for education for breathing techniques including pursed lip breathing techniques (PLB) and circulatory breathing. Additionally, patient was educated on energy conservation techniques, including how they specifically apply to functional activities; This includes pacing, rest breaks, and planning.  Patient with good verbal understanding however needing additional cuing through out tasks to use techniques. Additional time needed during tasks. Cognitive Retraining  Safety/Judgement: Awareness of environment;Home safety; Insight into deficits    Functional Measure:    Barthel Index:  Bathin  Bladder: 10  Bowels: 10  Groomin  Dressin  Feeding: 10  Mobility: 0  Stairs: 0  Toilet Use: 5  Transfer (Bed to Chair and Back): 10  Total: 55/100      The Barthel ADL Index: Guidelines  1. The index should be used as a record of what a patient does, not as a record of what a patient could do. 2. The main aim is to establish degree of independence from any help, physical or verbal, however minor and for whatever reason. 3. The need for supervision renders the patient not independent. 4. A patient's performance should be established using the best available evidence. Asking the patient, friends/relatives and nurses are the usual sources, but direct observation and common sense are also important. However direct testing is not needed. 5. Usually the patient's performance over the preceding 24-48 hours is important, but occasionally longer periods will be relevant. 6. Middle categories imply that the patient supplies over 50 per cent of the effort. 7. Use of aids to be independent is allowed. Score Interpretation (from [address] [address])    Independent   60-79 Minimally independent   40-59 Partially dependent   20-39 Very dependent   <20 Totally dependent     -Lia Melendez., Barthel, D.W. (1965). Functional evaluation: the Barthel Index. [address] [facility] ([address] (). The Barthel activities of daily living index: self-reporting versus actual performance in the old (> or = 75 years). Journal of [address] [place] [address] 45(7), 14 [facility], J.J..F, Fe Matamoros., Jamila Fuller. (1999).  Measuring the change in disability after inpatient rehabilitation; comparison of the responsiveness of the Barthel Index and Functional [place] Measure. Journal of Neurology, Neurosurgery, and Psychiatry, 66(4), 431-950. CANDY Cisse.A, MIMI Uribe, & Walt Barnes M.A. (2004) Assessment of post-stroke quality of life in cost-effectiveness studies: The usefulness of the Barthel Index and the EuroQoL-5D. Quality of Life Research, 15, 145-19       Occupational Therapy Evaluation Charge Determination   History Examination Decision-Making   LOW Complexity : Brief history review  MEDIUM Complexity : 3-5 performance deficits relating to physical, cognitive , or psychosocial skils that result in activity limitations and / or participation restrictions LOW Complexity : No comorbidities that affect functional and no verbal or physical assistance needed to complete eval tasks       Based on the above components, the patient evaluation is determined to be of the following complexity level: LOW   Pain Rating:  No c/o pain    Activity Tolerance:   Poor, desaturates with exertion and requires oxygen and requires frequent rest breaks    After treatment patient left in no apparent distress:    Sitting in chair, Call bell within reach and RN notified and following    COMMUNICATION/EDUCATION:   The patients plan of care was discussed with: Physical therapist and Registered nurse.      Thank you for this referral.  Garrett Menendez, OT  Time Calculation: 26 mins

## 2022-01-05 NOTE — ED NOTES
Patient is being transferred to [address], Room # [phone]. Report given to floor RN on Henry Dick for routine progression of care. Report consisted of the following information SBAR, Kardex, ED Summary and MAR. Patient transferred to receiving unit by: Shayna Kidd (RN or tech name). Outstanding consults needed: Yes    Next labs due: Yes    The following personal items will be sent with the patient during transfer to the floor: All valuables:    Cardiac monitoring ordered: Yes    The following CURRENT information was reported to the receiving RN:    Code status: Full Code at time of transfer    Last set of vital signs:  Vital Signs  Level of Consciousness: Alert (0) (01/04/22 0952)  Temp: 98.2 °F (36.8 °C) (01/04/22 1208)  Temp Source: Oral (01/04/22 1208)  Pulse (Heart Rate): (!) 59 (01/04/22 1912)  Resp Rate: 22 (01/04/22 1912)  BP: (!) 159/81 (01/04/22 1912)  MAP (Monitor): 100 (01/04/22 1912)  MAP (Calculated): 107 (01/04/22 1912)  BP 1 Location: Right upper arm (01/04/22 0952)         Oxygen Therapy  O2 Sat (%): (!) 89 % (01/04/22 1912)  Pulse via Oximetry: 59 beats per minute (01/04/22 1912)  O2 Device: Non-rebreather mask (01/04/22 0957)  O2 Flow Rate (L/min): 15 l/min (01/04/22 0957)      Last pain assessment:  Pain 1  Pain Scale 1: Numeric (0 - 10)  Pain Intensity 1: 6  Pain Location 1: Chest (pt complains of pain from coughing )      Wounds: No     Urinary catheter: voiding  Is there a singleton order: No     LDAs:       Peripheral IV 04/37/99 Left Basilic (Active)   Site Assessment Clean, dry, & intact 01/04/22 1131   Phlebitis Assessment 0 01/04/22 1131   Infiltration Assessment 0 01/04/22 1131   Dressing Status Clean, dry, & intact 01/04/22 1131   Dressing Type Tape;Transparent 01/04/22 1131   Hub Color/Line Status Pink;Capped;Flushed 01/04/22 1131         Opportunity for questions and clarification was provided.     Clifton Hurd RN

## 2022-01-05 NOTE — CONSULTS
Pulmonary, Critical Care, and Sleep Medicine    Initial Patient Consult    Name: Xochitl Herbert MRN: [id number]   : 1960 Hospital: [facility]    Date: 2022        IMPRESSION:   · Acute respiratory failure  · COVID +  · COVID pneumonia       RECOMMENDATIONS:   · On HF O2  · On decadron  · S/p actemra  · DVT prophylaxis  · Vaccinated x 2      Subjective: This patient has been seen and evaluated at the request of Dr. Rudy Valdovinos for acute respiratory failure. Patient is a 64 y.o. female vaccinated x 2 presented with worsening sob, hypoxemia  CXR c/w covid pneumonia  Pt today on HF O2  No severe distress      Past Medical History:   Diagnosis Date    Anxiety disorder     Arthritis     knee arthritis    Depression     Fatigue     GERD (gastroesophageal reflux disease)     Headache     Heart disease     Hiatal hernia     Hypertension     Ischemic colon ([address] 2009    Kidney disease     Leg swelling     LVH (left ventricular hypertrophy) due to hypertensive disease     dr Gus[facility]    Morbid obesity ([address]     Postmenopausal     Short of breath on exertion     Thyroid disease     TIA (transient ischemic attack)     Venous stasis     Vertigo       Past Surgical History:   Procedure Laterality Date    COLONOSCOPY,DIAGNOSTIC  2012    Dr. Aniceto Desai. Int hemorrhoids, repeat in 10 yrs    ENDOSCOPY, COLON, DIAGNOSTIC  2009    3/09- repeat q 3 for ischemic colitis    HX CHOLECYSTECTOMY      HX GYN  [id number]    2 c-sections    HX GYN      endometrial ablation    HX LAP GASTRIC BYPASS  2013    Dr. Karl Kelley        Prior to Admission medications    Medication Sig Start Date End Date Taking? Authorizing Provider   pantoprazole (PROTONIX) 40 mg tablet Take 1 Tablet by mouth daily. 21   Odessa Degroot MD   losartan-hydroCHLOROthiazide (HYZAAR) 100-25 mg per tablet Take 1 Tablet by mouth Every morning.  21   Madelyn MD Ana   spironolactone (ALDACTONE) 25 mg tablet Take 1 Tablet by mouth Every morning. 12/2/21   Morgan Barnhart MD   levothyroxine (SYNTHROID) 100 mcg tablet TAKE 1 TABLET BY MOUTH ONCE DAILY BEFORE BREAKFAST 9/9/21   Jyoti Shah MD   dilTIAZem ER [facility]) 360 mg capsule Take 1 Capsule by mouth daily. 8/10/21   Morgan Barnhart MD   losartan (COZAAR) 100 mg tablet TAKE 1 TABLET BY MOUTH ONCE DAILY FOR HIGH BLOOD PRESSURE 6/17/21   Morgan Barnhart MD   linaCLOtide (Linzess) 72 mcg cap capsule Take 1 Cap by mouth Daily (before breakfast). For chronic constipation 4/28/21   Morgan Barnhart MD   FLUoxetine 60 mg tab 60 mg. 3/26/20   Provider, Historical   hydroCHLOROthiazide (HYDRODIURIL) 25 mg tablet Take 1 Tab by mouth daily. Indications: high blood pressure 1/31/20   Tracey Joshi MD   buPROPion XL (WELLBUTRIN XL) 300 mg XL tablet Take 1 Tab by mouth every morning. 9/18/19   Morgan Barnhart MD   QUEtiapine (SEROQUEL) 50 mg tablet Take 2 Tabs by mouth nightly. 8/8/19   Morgan Barnhart MD   simethicone (GAS-X PO) Take  by mouth.     Provider, Historical   LORazepam (ATIVAN) 0.5 mg tablet TAKE ONE BY MOUTH DAILY AS NEEDED FOR ANXIETY 6/7/19   Morgan Barnhart MD     Allergies   Allergen Reactions    Lisinopril Cough    Lortab [Hydrocodone-Acetaminophen] Itching    Morphine Nausea and Vomiting      Social History     Tobacco Use    Smoking status: Former Smoker     Packs/day: 1.00    Smokeless tobacco: Former User     Quit date: 1/29/2020   Substance Use Topics    Alcohol use: Yes     Comment: wine sometimes      Family History   Problem Relation Age of Onset    Heart Disease Mother         PULM HTN    Stroke Mother     Hypertension Mother     Colon Polyps Mother     Kidney Disease Mother         DIALYSIS PT    Thyroid Disease Mother     Other Mother         BLOOD CLOT HX    Diabetes Father     Hypertension Father     Colon Polyps Father     Heart Disease Father     Psychiatric Disorder Father         ZAY    Diabetes Brother     Hypertension Brother     Kidney Disease Maternal Aunt         DIALYSIS    Kidney Disease Maternal Uncle     Kidney Disease Maternal Grandmother     Heart Disease Maternal Grandmother     Diabetes Paternal Grandfather     Kidney Disease Maternal Aunt         DIALYSIS    Kidney Disease Maternal Uncle         DIALYSIS    Other Son     Colon Polyps Maternal Grandfather         Current Facility-Administered Medications   Medication Dose Route Frequency    guaiFENesin ER (MUCINEX) tablet 600 mg  600 mg Oral BID    ascorbic acid (vitamin C) (VITAMIN C) tablet 1,000 mg  1,000 mg Oral DAILY    cholecalciferol (VITAMIN D3) (1000 Units /25 mcg) tablet 1,000 Units  1,000 Units Oral DAILY    zinc sulfate (ZINCATE) 50 mg zinc (220 mg) capsule 1 Capsule  1 Capsule Oral DAILY    sodium chloride (NS) flush 5-40 mL  5-40 mL IntraVENous Q8H    polyethylene glycol (MIRALAX) packet 17 g  17 g Oral DAILY    enoxaparin (LOVENOX) injection 40 mg  40 mg SubCUTAneous DAILY    dexamethasone (DECADRON) 20 mg in 0.9% sodium chloride 50 mL IVPB  20 mg IntraVENous Q24H    [START ON 1/9/2022] dexamethasone (PF) (DECADRON) 10 mg/mL injection 10 mg  10 mg IntraVENous Q24H    insulin lispro (HUMALOG) injection   SubCUTAneous AC&HS    buPROPion XL (WELLBUTRIN XL) tablet 300 mg  300 mg Oral 7am    FLUoxetine (PROzac) capsule 40 mg  40 mg Oral DAILY    levothyroxine (SYNTHROID) tablet 100 mcg  100 mcg Oral ACB    losartan (COZAAR) tablet 100 mg  100 mg Oral DAILY    pantoprazole (PROTONIX) tablet 40 mg  40 mg Oral DAILY    QUEtiapine (SEROquel) tablet 100 mg  100 mg Oral QHS    dilTIAZem ER (CARDIZEM CD) capsule 360 mg  360 mg Oral DAILY       Review of Systems:  A comprehensive review of systems was negative except for: Respiratory: positive for cough or dyspnea on exertion    Objective:   Vital Signs:    Visit Vitals  /66 (BP 1 Location: Right upper arm, BP Patient Position: At rest)   Pulse (!) 47   Temp 98.8 °F (37.1 °C)   Resp 26   Ht 5' 5\" (1.651 m)   Wt 99.3 kg (219 lb)   SpO2 94%   Breastfeeding No   BMI 36.44 kg/m²       O2 Device: Heated,Hi flow nasal cannula   O2 Flow Rate (L/min): 40 l/min   Temp (24hrs), Av.5 °F (36.9 °C), Min:98.2 °F (36.8 °C), Max:98.8 °F (37.1 °C)       Intake/Output:   Last shift:      No intake/output data recorded. Last 3 shifts:  1901 -  0700  In: -   Out: 400 [Urine:400]    Intake/Output Summary (Last 24 hours) at 2022 09  Last data filed at 2022  Gross per 24 hour   Intake    Output 400 ml   Net -400 ml      Physical Exam:   General:  Alert, cooperative, no distress, appears stated age. Head:  Normocephalic, without obvious abnormality, atraumatic. Eyes:     Nose:    Throat:    Neck:    Back:      Lungs:      Chest wall:  No tenderness or deformity.  No accessory muscle use   Heart:     Abdomen:      Extremities:    Pulses:    Skin:    Lymph nodes:    Neurologic: Grossly nonfocal     Data review:     Recent Results (from the past 24 hour(s))   EKG, 12 LEAD, INITIAL    Collection Time: 22 10:45 AM   Result Value Ref Range    Ventricular Rate 58 BPM    Atrial Rate 58 BPM    P-R Interval 158 ms    QRS Duration 88 ms    Q-T Interval 450 ms    QTC Calculation (Bezet) 441 ms    Calculated P Axis 57 degrees    Calculated R Axis 6 degrees    Calculated T Axis 37 degrees    Diagnosis       Sinus bradycardia  Confirmed by Verlon Cons ([id number]) on 2022 12:55:59 PM     CBC WITH AUTOMATED DIFF    Collection Time: 22 10:56 AM   Result Value Ref Range    WBC 6.1 3.6 - 11.0 K/uL    RBC 4.61 3.80 - 5.20 M/uL    HGB 11.3 (L) 11.5 - 16.0 g/dL    HCT 35.9 35.0 - 47.0 %    MCV 77.9 (L) 80.0 - 99.0 FL    MCH 24.5 (L) 26.0 - 34.0 PG    MCHC 31.5 30.0 - 36.5 g/dL    RDW 16.4 (H) 11.5 - 14.5 %    PLATELET 546 343 - 806 K/uL    MPV 10.3 8.9 - 12.9 FL    NRBC 0.0 0  WBC    ABSOLUTE NRBC 0.00 0.00 - 0.01 K/uL    NEUTROPHILS 83 (H) 32 - 75 %    LYMPHOCYTES 11 (L) 12 - 49 %    MONOCYTES 5 5 - 13 %    EOSINOPHILS 0 0 - 7 %    BASOPHILS 0 0 - 1 %    IMMATURE GRANULOCYTES 1 (H) 0.0 - 0.5 %    ABS. NEUTROPHILS 5.0 1.8 - 8.0 K/UL    ABS. LYMPHOCYTES 0.7 (L) 0.8 - 3.5 K/UL    ABS. MONOCYTES 0.3 0.0 - 1.0 K/UL    ABS. EOSINOPHILS 0.0 0.0 - 0.4 K/UL    ABS. BASOPHILS 0.0 0.0 - 0.1 K/UL    ABS. IMM. GRANS. 0.1 (H) 0.00 - 0.04 K/UL    DF SMEAR SCANNED      RBC COMMENTS ANISOCYTOSIS  1+        RBC COMMENTS MICROCYTOSIS  1+       METABOLIC PANEL, COMPREHENSIVE    Collection Time: 01/04/22 10:56 AM   Result Value Ref Range    Sodium 142 136 - 145 mmol/L    Potassium 3.2 (L) 3.5 - 5.1 mmol/L    Chloride 108 97 - 108 mmol/L    CO2 23 21 - 32 mmol/L    Anion gap 11 5 - 15 mmol/L    Glucose 119 (H) 65 - 100 mg/dL    BUN 18 6 - 20 MG/DL    Creatinine 1.41 (H) 0.55 - 1.02 MG/DL    BUN/Creatinine ratio 13 12 - 20      GFR est AA 46 (L) >60 ml/min/1.73m2    GFR est non-AA 38 (L) >60 ml/min/1.73m2    Calcium 8.9 8.5 - 10.1 MG/DL    Bilirubin, total 0.4 0.2 - 1.0 MG/DL    ALT (SGPT) 30 12 - 78 U/L    AST (SGOT) 50 (H) 15 - 37 U/L    Alk.  phosphatase 101 45 - 117 U/L    Protein, total 7.0 6.4 - 8.2 g/dL    Albumin 2.5 (L) 3.5 - 5.0 g/dL    Globulin 4.5 (H) 2.0 - 4.0 g/dL    A-G Ratio 0.6 (L) 1.1 - 2.2     TROPONIN-HIGH SENSITIVITY    Collection Time: 01/04/22 10:56 AM   Result Value Ref Range    Troponin-High Sensitivity 17 0 - 51 ng/L   SARS-COV-2    Collection Time: 01/04/22 10:56 AM   Result Value Ref Range    SARS-CoV-2 Please find results under separate order     D DIMER    Collection Time: 01/04/22 10:56 AM   Result Value Ref Range    D-dimer 0.78 (H) 0.00 - 0.65 mg/L FEU   SARS-COV-2    Collection Time: 01/04/22 10:56 AM   Result Value Ref Range    Specimen source Nasopharyngeal      SARS-CoV-2 Detected (A) NOTD     C REACTIVE PROTEIN, QT    Collection Time: 01/04/22  7:24 PM   Result Value Ref Range    C-Reactive protein 22.00 (H) 0.00 - 0.60 mg/dL   GLUCOSE, POC    Collection Time: 01/04/22  9:33 PM   Result Value Ref Range    Glucose (POC) 144 (H) 65 - 117 mg/dL    Performed by Manasa Oneill RN    METABOLIC PANEL, COMPREHENSIVE    Collection Time: 01/05/22  2:17 AM   Result Value Ref Range    Sodium 140 136 - 145 mmol/L    Potassium 3.9 3.5 - 5.1 mmol/L    Chloride 110 (H) 97 - 108 mmol/L    CO2 22 21 - 32 mmol/L    Anion gap 8 5 - 15 mmol/L    Glucose 153 (H) 65 - 100 mg/dL    BUN 24 (H) 6 - 20 MG/DL    Creatinine 1.45 (H) 0.55 - 1.02 MG/DL    BUN/Creatinine ratio 17 12 - 20      GFR est AA 44 (L) >60 ml/min/1.73m2    GFR est non-AA 37 (L) >60 ml/min/1.73m2    Calcium 8.8 8.5 - 10.1 MG/DL    Bilirubin, total 0.4 0.2 - 1.0 MG/DL    ALT (SGPT) 26 12 - 78 U/L    AST (SGOT) 34 15 - 37 U/L    Alk. phosphatase 93 45 - 117 U/L    Protein, total 6.6 6.4 - 8.2 g/dL    Albumin 2.2 (L) 3.5 - 5.0 g/dL    Globulin 4.4 (H) 2.0 - 4.0 g/dL    A-G Ratio 0.5 (L) 1.1 - 2.2     CBC WITH AUTOMATED DIFF    Collection Time: 01/05/22  2:17 AM   Result Value Ref Range    WBC 4.6 3.6 - 11.0 K/uL    RBC 4.13 3.80 - 5.20 M/uL    HGB 10.1 (L) 11.5 - 16.0 g/dL    HCT 32.1 (L) 35.0 - 47.0 %    MCV 77.7 (L) 80.0 - 99.0 FL    MCH 24.5 (L) 26.0 - 34.0 PG    MCHC 31.5 30.0 - 36.5 g/dL    RDW 16.5 (H) 11.5 - 14.5 %    PLATELET 408 305 - 756 K/uL    MPV 9.6 8.9 - 12.9 FL    NRBC 0.0 0  WBC    ABSOLUTE NRBC 0.00 0.00 - 0.01 K/uL    NEUTROPHILS 84 (H) 32 - 75 %    LYMPHOCYTES 10 (L) 12 - 49 %    MONOCYTES 5 5 - 13 %    EOSINOPHILS 0 0 - 7 %    BASOPHILS 0 0 - 1 %    IMMATURE GRANULOCYTES 1 (H) 0.0 - 0.5 %    ABS. NEUTROPHILS 3.8 1.8 - 8.0 K/UL    ABS. LYMPHOCYTES 0.5 (L) 0.8 - 3.5 K/UL    ABS. MONOCYTES 0.2 0.0 - 1.0 K/UL    ABS. EOSINOPHILS 0.0 0.0 - 0.4 K/UL    ABS. BASOPHILS 0.0 0.0 - 0.1 K/UL    ABS. IMM.  GRANS. 0.0 0.00 - 0.04 K/UL    DF AUTOMATED         Imaging:  I have personally reviewed the patients radiographs and have reviewed the reports:  CXR and chest ct c/w covid pneumonia        Jessica Sparks MD

## 2022-01-05 NOTE — PROGRESS NOTES
Hospitalist Progress Note    NAME: Ade Petersen   :  1960   MRN:  [id number]       Assessment / Plan:  COVID-19 pneumonia POA causing  Acute respiratory failure with hypoxia POA currently on 15 liters mid flow  Presents with 10 days increasing cough and BUSTOS              Diagnosed at [facility] 1 week ago  Came to emergency room with acutely worsening shortness of breath  Vaccinated x 2 in Sept-Oct 2021, no booster  CXR with Multilobar patchy airspace disease compatible with Covid pneumonia. CTA chest reviewed, bilateral ASD, no PE  O2 sats on RA were in 80s on RA with EMS, RR 28 , Dyspnea  C/w tele  O2 to maintain sats > 90%  Currently on 40 L high flow  IV dexamethasone 20 mg  --> 10 mg  s/p tocilizumab  with increased oxygenation  IV remdesevir not indicated, > 7 days since onset  PCT pending  CRP 22  Pulmonary following  Symbicort added  IS and proning  Vitamin C, D and zinc    Prediabetes POA last hemoglobin A1c 6.1 in 2021  Not currently on treatment  At risk for hyperglycemia on steroids  Sliding scale insulin, check blood sugars  C/w scheduled insulin with steroids if hyperglycemic  Check hemoglobin A1c     Essential hypertension POA  LVH due to hypertension POA  History of TIA POA  Continue losartan,  Hold HCTZ and spironolactone for now  Continue diltiazem CD  Low-dose aspirin     Depression  Continue Seroquel, fluoxetine  Low-dose Ativan as needed  Constipation  Linzess as needed     Hypothyroidism POA  Continue levothyroxine        Obesity POA Body mass index is 36.44 kg/m².     Given the patient's current clinical presentation, I have a high level of concern for decompensation if discharged from the emergency department. My assessment of this patient's clinical condition and my plan of care is as noted above.     DVT prophylaxis with lovenox     Code status: full code  NOK:       30.0 - 39.9 Obese / Body mass index is 36.44 kg/m².     Estimated discharge date: January 10  Barriers: O2 requirements         Subjective:     Chief Complaint / Reason for Physician Visit  Patient seen and examined at the bedside. She states that she feels better than she did yesterday. She is fearful about the course of her illness. I explained that we just need to take it day by day and that she needs to do incentive spirometry and proning to assist in her recovery. Patient verbalized understanding. Discussed with RN events overnight. Review of Systems:  Symptom Y/N Comments  Symptom Y/N Comments   Fever/Chills    Chest Pain     Poor Appetite    Edema     Cough    Abdominal Pain     Sputum    Joint Pain     SOB/BUSTOS    Pruritis/Rash     Nausea/vomit    Tolerating PT/OT     Diarrhea    Tolerating Diet     Constipation    Other       Could NOT obtain due to:      Objective:     VITALS:   Last 24hrs VS reviewed since prior progress note.  Most recent are:  Patient Vitals for the past 24 hrs:   Temp Pulse Resp BP SpO2   01/05/22 0804 98.8 °F (37.1 °C) (!) 47 26 113/66 94 %   01/05/22 0459 98.7 °F (37.1 °C) (!) 51 23 (!) 112/58 95 %   01/05/22 0222 98.4 °F (36.9 °C) (!) 57 24 125/68 93 %   01/05/22 0056     94 %   01/04/22 2140     (!) 89 %   01/04/22 2045 98.5 °F (36.9 °C) 60 21 (!) 150/82 91 %   01/04/22 2000  (!) 58 22 (!) 145/76 90 %   01/04/22 1912  (!) 59 22 (!) 159/81 (!) 89 %   01/04/22 1827  (!) 56 26 137/68 91 %   01/04/22 1742  (!) 59 25 (!) 147/79 93 %   01/04/22 1657  (!) 58 20 (!) 142/81 93 %   01/04/22 1527  63 25 (!) 157/85 92 %   01/04/22 1427  62 28  93 %   01/04/22 1208 98.2 °F (36.8 °C)       01/04/22 1142  60 25 (!) 144/67 (!) 89 %   01/04/22 1057  (!) 58 27 119/67 93 %       Intake/Output Summary (Last 24 hours) at 1/5/2022 1027  Last data filed at 1/4/2022 2045  Gross per 24 hour   Intake    Output 400 ml   Net -400 ml        I had a face to face encounter and independently examined this patient on 1/5/2022, as outlined below:  PHYSICAL EXAM:  General: WD, WN. Alert, cooperative, no acute distress    EENT:  EOMI. Anicteric sclerae. MMM  Resp:  CTA bilaterally, no wheezing or rales. No accessory muscle use  CV:  Regular  rhythm,  No edema  GI:  Soft, Non distended, Non tender. +Bowel sounds  Neurologic:  Alert and oriented X 3, normal speech,   Psych:   Good insight. Not anxious nor agitated  Skin:  No rashes. No jaundice    Reviewed most current lab test results and cultures  YES  Reviewed most current radiology test results   YES  Review and summation of old records today    NO  Reviewed patient's current orders and MAR    YES  PMH/SH reviewed - no change compared to H&P  ________________________________________________________________________  Care Plan discussed with:    Comments   Patient     Family      RN     Care Manager     Consultant                        Multidiciplinary team rounds were held today with , nursing, pharmacist and clinical coordinator. Patient's plan of care was discussed; medications were reviewed and discharge planning was addressed. ________________________________________________________________________  Total NON critical care TIME:  31   Minutes    Total CRITICAL CARE TIME Spent:   Minutes non procedure based      Comments   >50% of visit spent in counseling and coordination of care     ________________________________________________________________________  Marizol Adams MD     Procedures: see electronic medical records for all procedures/Xrays and details which were not copied into this note but were reviewed prior to creation of Plan. LABS:  I reviewed today's most current labs and imaging studies.   Pertinent labs include:  Recent Labs     01/05/22  0217 01/04/22  1056   WBC 4.6 6.1   HGB 10.1* 11.3*   HCT 32.1* 35.9    244     Recent Labs     01/05/22  0217 01/04/22  1056    142   K 3.9 3.2*   * 108   CO2 22 23   * 119*   BUN 24* 18   CREA 1.45* 1.41*   CA 8.8 8. 9   ALB 2.2* 2.5*   TBILI 0.4 0.4   ALT 26 30       Signed: Reinaldo Crane MD

## 2022-01-05 NOTE — PROGRESS NOTES
Called and updated pts sister, Yasir Mcghee, of discharge time. Consult for Baricitinib vs Tocilizumab consideration for severe COVID    Pt meets criteria for Tocilizumab due to:    High Flow O2 15 liters  CRP-QT of 22      Tocilizumab 810 mg IV x1 ordered for weight greater than 90 kg    MANJULA Riley [facility]

## 2022-01-06 LAB
ALBUMIN SERPL-MCNC: 2.2 G/DL (ref 3.5–5)
ALBUMIN/GLOB SERPL: 0.6 {RATIO} (ref 1.1–2.2)
ALP SERPL-CCNC: 82 U/L (ref 45–117)
ALT SERPL-CCNC: 33 U/L (ref 12–78)
ANION GAP SERPL CALC-SCNC: 8 MMOL/L (ref 5–15)
AST SERPL-CCNC: 39 U/L (ref 15–37)
BASOPHILS # BLD: 0 K/UL (ref 0–0.1)
BASOPHILS NFR BLD: 0 % (ref 0–1)
BILIRUB SERPL-MCNC: 0.9 MG/DL (ref 0.2–1)
BUN SERPL-MCNC: 36 MG/DL (ref 6–20)
BUN/CREAT SERPL: 20 (ref 12–20)
CALCIUM SERPL-MCNC: 8.8 MG/DL (ref 8.5–10.1)
CHLORIDE SERPL-SCNC: 109 MMOL/L (ref 97–108)
CO2 SERPL-SCNC: 22 MMOL/L (ref 21–32)
CREAT SERPL-MCNC: 1.82 MG/DL (ref 0.55–1.02)
DIFFERENTIAL METHOD BLD: ABNORMAL
EOSINOPHIL # BLD: 0 K/UL (ref 0–0.4)
EOSINOPHIL NFR BLD: 0 % (ref 0–7)
ERYTHROCYTE [DISTWIDTH] IN BLOOD BY AUTOMATED COUNT: 16.1 % (ref 11.5–14.5)
GLOBULIN SER CALC-MCNC: 4 G/DL (ref 2–4)
GLUCOSE BLD STRIP.AUTO-MCNC: 130 MG/DL (ref 65–117)
GLUCOSE BLD STRIP.AUTO-MCNC: 154 MG/DL (ref 65–117)
GLUCOSE BLD STRIP.AUTO-MCNC: 162 MG/DL (ref 65–117)
GLUCOSE BLD STRIP.AUTO-MCNC: 181 MG/DL (ref 65–117)
GLUCOSE BLD STRIP.AUTO-MCNC: 188 MG/DL (ref 65–117)
GLUCOSE SERPL-MCNC: 180 MG/DL (ref 65–100)
HCT VFR BLD AUTO: 31.7 % (ref 35–47)
HGB BLD-MCNC: 9.8 G/DL (ref 11.5–16)
IMM GRANULOCYTES # BLD AUTO: 0 K/UL (ref 0–0.04)
IMM GRANULOCYTES NFR BLD AUTO: 1 % (ref 0–0.5)
LYMPHOCYTES # BLD: 0.5 K/UL (ref 0.8–3.5)
LYMPHOCYTES NFR BLD: 12 % (ref 12–49)
MAGNESIUM SERPL-MCNC: 2.3 MG/DL (ref 1.6–2.4)
MCH RBC QN AUTO: 24.4 PG (ref 26–34)
MCHC RBC AUTO-ENTMCNC: 30.9 G/DL (ref 30–36.5)
MCV RBC AUTO: 78.9 FL (ref 80–99)
MONOCYTES # BLD: 0.1 K/UL (ref 0–1)
MONOCYTES NFR BLD: 3 % (ref 5–13)
NEUTS SEG # BLD: 3.6 K/UL (ref 1.8–8)
NEUTS SEG NFR BLD: 84 % (ref 32–75)
NRBC # BLD: 0 K/UL (ref 0–0.01)
NRBC BLD-RTO: 0 PER 100 WBC
PHOSPHATE SERPL-MCNC: 4.4 MG/DL (ref 2.6–4.7)
PLATELET # BLD AUTO: 288 K/UL (ref 150–400)
PMV BLD AUTO: 9.7 FL (ref 8.9–12.9)
POTASSIUM SERPL-SCNC: 4.1 MMOL/L (ref 3.5–5.1)
PROCALCITONIN SERPL-MCNC: <0.05 NG/ML
PROT SERPL-MCNC: 6.2 G/DL (ref 6.4–8.2)
RBC # BLD AUTO: 4.02 M/UL (ref 3.8–5.2)
SERVICE CMNT-IMP: ABNORMAL
SODIUM SERPL-SCNC: 139 MMOL/L (ref 136–145)
WBC # BLD AUTO: 4.3 K/UL (ref 3.6–11)

## 2022-01-06 PROCEDURE — 74011250637 HC RX REV CODE- 250/637: Performed by: INTERNAL MEDICINE

## 2022-01-06 PROCEDURE — 74011636637 HC RX REV CODE- 636/637: Performed by: INTERNAL MEDICINE

## 2022-01-06 PROCEDURE — 65660000000 HC RM CCU STEPDOWN

## 2022-01-06 PROCEDURE — 80053 COMPREHEN METABOLIC PANEL: CPT

## 2022-01-06 PROCEDURE — 74011250636 HC RX REV CODE- 250/636: Performed by: INTERNAL MEDICINE

## 2022-01-06 PROCEDURE — 85025 COMPLETE CBC W/AUTO DIFF WBC: CPT

## 2022-01-06 PROCEDURE — 83735 ASSAY OF MAGNESIUM: CPT

## 2022-01-06 PROCEDURE — 84145 PROCALCITONIN (PCT): CPT

## 2022-01-06 PROCEDURE — 94640 AIRWAY INHALATION TREATMENT: CPT

## 2022-01-06 PROCEDURE — 77010033711 HC HIGH FLOW OXYGEN

## 2022-01-06 PROCEDURE — 74011000250 HC RX REV CODE- 250: Performed by: INTERNAL MEDICINE

## 2022-01-06 PROCEDURE — 74011000258 HC RX REV CODE- 258: Performed by: INTERNAL MEDICINE

## 2022-01-06 PROCEDURE — 82962 GLUCOSE BLOOD TEST: CPT

## 2022-01-06 PROCEDURE — 36415 COLL VENOUS BLD VENIPUNCTURE: CPT

## 2022-01-06 PROCEDURE — 84100 ASSAY OF PHOSPHORUS: CPT

## 2022-01-06 RX ADMIN — DEXAMETHASONE SODIUM PHOSPHATE 20 MG: 4 INJECTION, SOLUTION INTRAMUSCULAR; INTRAVENOUS at 17:19

## 2022-01-06 RX ADMIN — GUAIFENESIN 600 MG: 600 TABLET, EXTENDED RELEASE ORAL at 17:19

## 2022-01-06 RX ADMIN — QUETIAPINE FUMARATE 100 MG: 25 TABLET ORAL at 21:18

## 2022-01-06 RX ADMIN — ENOXAPARIN SODIUM 40 MG: 100 INJECTION SUBCUTANEOUS at 08:34

## 2022-01-06 RX ADMIN — BUDESONIDE AND FORMOTEROL FUMARATE DIHYDRATE 2 PUFF: 80; 4.5 AEROSOL RESPIRATORY (INHALATION) at 21:13

## 2022-01-06 RX ADMIN — GUAIFENESIN 600 MG: 600 TABLET, EXTENDED RELEASE ORAL at 08:33

## 2022-01-06 RX ADMIN — LEVOTHYROXINE SODIUM 100 MCG: 0.1 TABLET ORAL at 08:34

## 2022-01-06 RX ADMIN — Medication 2 UNITS: at 12:17

## 2022-01-06 RX ADMIN — Medication 1 CAPSULE: at 08:33

## 2022-01-06 RX ADMIN — SODIUM CHLORIDE, PRESERVATIVE FREE 10 ML: 5 INJECTION INTRAVENOUS at 21:19

## 2022-01-06 RX ADMIN — OXYCODONE HYDROCHLORIDE AND ACETAMINOPHEN 1000 MG: 500 TABLET ORAL at 08:32

## 2022-01-06 RX ADMIN — BUDESONIDE AND FORMOTEROL FUMARATE DIHYDRATE 2 PUFF: 80; 4.5 AEROSOL RESPIRATORY (INHALATION) at 09:02

## 2022-01-06 RX ADMIN — Medication 1000 UNITS: at 08:33

## 2022-01-06 RX ADMIN — DILTIAZEM HYDROCHLORIDE 360 MG: 180 CAPSULE, COATED, EXTENDED RELEASE ORAL at 08:33

## 2022-01-06 RX ADMIN — LOSARTAN POTASSIUM 100 MG: 100 TABLET, FILM COATED ORAL at 08:34

## 2022-01-06 RX ADMIN — PANTOPRAZOLE SODIUM 40 MG: 40 TABLET, DELAYED RELEASE ORAL at 08:34

## 2022-01-06 RX ADMIN — FLUOXETINE 40 MG: 20 CAPSULE ORAL at 08:33

## 2022-01-06 RX ADMIN — LORAZEPAM 0.5 MG: 0.5 TABLET ORAL at 08:33

## 2022-01-06 RX ADMIN — SODIUM CHLORIDE, PRESERVATIVE FREE 10 ML: 5 INJECTION INTRAVENOUS at 08:34

## 2022-01-06 RX ADMIN — Medication 2 UNITS: at 08:34

## 2022-01-06 RX ADMIN — BUPROPION HYDROCHLORIDE 300 MG: 150 TABLET, EXTENDED RELEASE ORAL at 08:33

## 2022-01-06 RX ADMIN — SODIUM CHLORIDE, PRESERVATIVE FREE 10 ML: 5 INJECTION INTRAVENOUS at 13:13

## 2022-01-06 NOTE — PROGRESS NOTES
Hospitalist Progress Note    NAME: Martin Boogie   :  1960   MRN:  [id number]       Assessment / Plan:  COVID-19 pneumonia POA causing  Acute respiratory failure with hypoxia POA currently on 15 liters mid flow  Presents with 10 days increasing cough and BUSTOS              Diagnosed at [facility] 1 week ago  Came to emergency room with acutely worsening shortness of breath  Vaccinated x 2 in Sept-Oct 2021, no booster  CXR with Multilobar patchy airspace disease compatible with Covid pneumonia. CTA chest reviewed, bilateral ASD, no PE  O2 to maintain sats > 90%  Currently on 25 L from 40 L high flow yesterday  IV dexamethasone  as per hospital protocol  s/p tocilizumab   IV remdesevir not indicated, > 7 days since onset  We will send procalcitonin to decide on initiating antibiotics for possible bacterial superimposed pneumonia  CRP 22  Pulmonary following, input is appreciated  Symbicort added  IS and proning    Prediabetes POA last hemoglobin A1c 6.1 in 2021  Not currently on treatment  At risk for hyperglycemia on steroids  Sliding scale insulin, check blood sugars  C/w scheduled insulin with steroids if hyperglycemic  Check hemoglobin A1c     Essential hypertension POA  LVH due to hypertension POA  History of TIA POA  Continue losartan,  Hold HCTZ and spironolactone for now due to blood pressure on the lower side  Continue diltiazem CD  Low-dose aspirin     Depression  Continue Seroquel, fluoxetine  Low-dose Ativan as needed  Constipation  Linzess as needed     Hypothyroidism POA  Continue levothyroxine        Obesity POA Body mass index is 36.44 kg/m².          DVT prophylaxis with lovenox     Code status: full code  NOK:       30.0 - 39.9 Obese / Body mass index is 36.44 kg/m². Estimated discharge date: January 10  Barriers: O2 requirements         Subjective:     Patient was seen and examined. No acute events overnight. Discussed with RN overnight events.     All patient's questions were answered. \" I am feeling much better today\"    Review of Systems:  Symptom Y/N Comments  Symptom Y/N Comments   Fever/Chills n   Chest Pain n    Poor Appetite    Edema     Cough n   Abdominal Pain n    Sputum    Joint Pain     SOB/BUSTOS y  improving  Pruritis/Rash     Nausea/vomit n   Tolerating PT/OT     Diarrhea    Tolerating Diet y    Constipation    Other       Could NOT obtain due to:          Objective:     VITALS:   Last 24hrs VS reviewed since prior progress note. Most recent are:  Patient Vitals for the past 24 hrs:   Temp Pulse Resp BP SpO2   01/06/22 1055 98.7 °F (37.1 °C) 61 25 (!) 102/56 100 %   01/06/22 0903     96 %   01/06/22 0728 98.5 °F (36.9 °C) (!) 54 17 121/63 97 %   01/06/22 0552     96 %   01/06/22 0309 98.6 °F (37 °C) (!) 49 20 107/65 98 %   01/05/22 2311 98.8 °F (37.1 °C) (!) 53 26 (!) 110/54 94 %   01/05/22 2027     96 %   01/05/22 1418 98.7 °F (37.1 °C) 67 28 117/66 94 %   01/05/22 1145     94 %       Intake/Output Summary (Last 24 hours) at 1/6/2022 1127  Last data filed at 1/6/2022 0930  Gross per 24 hour   Intake 850 ml   Output    Net 850 ml        I had a face to face encounter and independently examined this patient on 1/6/2022, as outlined below:  PHYSICAL EXAM:  General: WD, WN. Alert, cooperative, no acute distress    EENT:  EOMI. Anicteric sclerae. MMM  Resp:  CTA bilaterally, no wheezing or rales. No accessory muscle use  CV:  Regular  rhythm,  No edema  GI:  Soft, Non distended, Non tender. +Bowel sounds  Neurologic:  Alert and oriented X 3, normal speech,   Psych:   Good insight. Not anxious nor agitated  Skin:  No rashes.   No jaundice    Reviewed most current lab test results and cultures  YES  Reviewed most current radiology test results   YES  Review and summation of old records today    NO  Reviewed patient's current orders and MAR    YES  PMH/SH reviewed - no change compared to H&P  ________________________________________________________________________  Care Plan discussed with:    Comments   Patient     Family      RN     Care Manager     Consultant                        Multidiciplinary team rounds were held today with , nursing, pharmacist and clinical coordinator. Patient's plan of care was discussed; medications were reviewed and discharge planning was addressed. ________________________________________________________________________  Total NON critical care TIME:  37 Minutes    Total CRITICAL CARE TIME Spent:   Minutes non procedure based      Comments   >50% of visit spent in counseling and coordination of care     ________________________________________________________________________  Otilia Palomo MD     Procedures: see electronic medical records for all procedures/Xrays and details which were not copied into this note but were reviewed prior to creation of Plan. LABS:  I reviewed today's most current labs and imaging studies.   Pertinent labs include:  Recent Labs     01/06/22 0430 01/05/22 0217 01/04/22  1056   WBC 4.3 4.6 6.1   HGB 9.8* 10.1* 11.3*   HCT 31.7* 32.1* 35.9    254 244     Recent Labs     01/06/22  0430 01/05/22 0217 01/04/22  1056    140 142   K 4.1 3.9 3.2*   * 110* 108   CO2 22 22 23   * 153* 119*   BUN 36* 24* 18   CREA 1.82* 1.45* 1.41*   CA 8.8 8.8 8.9   MG 2.3  --   --    PHOS 4.4  --   --    ALB 2.2* 2.2* 2.5*   TBILI 0.9 0.4 0.4   ALT 33 26 30       Signed: Otilia Palomo MD

## 2022-01-06 NOTE — PROGRESS NOTES
Transition of Care Plan:    RUR: 11%  Disposition: Home with spouse vs HH  Follow up appointments:  PCP, Specialists  DME needed: home oxygen? Transportation at Discharge: spouse  Keys or means to access home:  yes      IM Medicare Letter: n/a  Is patient a BCPI-A Bundle:       n/a    If yes, was Bundle Letter given?:    Is patient a  and connected with the [address]?  n/a  If yes, was [employer] transfer form completed and [facility] notified? Caregiver Contact:  Aure Florez [phone]  Discharge Caregiver contacted prior to discharge? CM will contact prior to d/c if pt desires. Reason for Admission:  PNA d/t Covid 19                     RUR Score:          11%            Plan for utilizing home health:      As needed    PCP: First and Last name:  Odessa Degroot MD     Name of Practice:    Are you a current patient: Yes/No: yes   Approximate date of last visit: 2 months ago    Can you participate in a virtual visit with your PCP: in office                    Current Advanced Directive/Advance Care Plan: Full Code    [place] (ACP) Conversation      Date of Conversation: 1/6/22  Conducted with: Patient with Decision Making Capacity    Healthcare Decision Maker:   No healthcare decision makers have been documented. Click here to complete [address] including selection of the Healthcare Decision Maker Relationship (ie \"Primary\")      Content/Action Overview:   DECLINED ACP conversation - will revisit periodically   Reviewed DNR/DNI and patient elects Full Code (Attempt Resuscitation)       Length of Voluntary ACP Conversation in minutes:  <16 minutes (Non-Billable)    Iven Moritz                              Transition of Care Plan:    Home with family vs HH            CM met with pt at bedside to introduce self/role, verify demographics, insurance and PCP. CM also discussed d/c plan.       Pt is a 65 yo, ,  female who was admitted to [facility] on 1/4/22 with the above dx. Pt sees her PCP every 6 months. Pt uses the [facility]. Pt lives with her spouse in a one fl home with 4 EDILSON. Pt does not use any DME. Pt does drive. Pt can complete her ADLs/IADLs independently at baseline. Pt's spouse will transport at d/c. CM will continue to assess for d/c needs. Care Management Interventions  PCP Verified by CM: Yes (Pt sees Dr. Xiomara Black.)  Mode of Transport at Discharge:  Other (see comment) (spouse)  Transition of Care Consult (CM Consult): Discharge Planning  Discharge Durable Medical Equipment: No  Physical Therapy Consult: Yes  Occupational Therapy Consult: Yes  Speech Therapy Consult: No  Support Systems: Spouse/Significant Other  Confirm Follow Up Transport: Self  The Patient and/or Patient Representative was Provided with a Choice of Provider and Agrees with the Discharge Plan?: Yes  Freedom of Choice List was Provided with Basic Dialogue that Supports the Patient's Individualized Plan of Care/Goals, Treatment Preferences and Shares the Quality Data Associated with the Providers?: Yes  Discharge Location  Discharge Placement: Home with family assistance    ISAAC Arias  Care Management, [address]

## 2022-01-06 NOTE — PROGRESS NOTES
0700- Report given to Nader Minaya RN by off going nurse. 4460- Pt assisted to recliner for breakfast.     1200- Pt assisted back to bed. 1900- Report given to oncoming nurse by Nader Minaya RN.

## 2022-01-06 NOTE — PROGRESS NOTES
Pulmonary, Critical Care, and Sleep Medicine      Name: Carleen Chavis MRN: [id number]   : 1960 Hospital: [facility]   Date: 2022        IMPRESSION:   · Acute respiratory failure  · COVID +  · COVID pneumonia       RECOMMENDATIONS:   · On HF O2  · On decadron  · S/p actemra  · DVT prophylaxis  · Vaccinated x 2      Subjective:     No acute events overnight  Still on HF O2  No severe distress       Current Facility-Administered Medications   Medication Dose Route Frequency    guaiFENesin ER (MUCINEX) tablet 600 mg  600 mg Oral BID    ascorbic acid (vitamin C) (VITAMIN C) tablet 1,000 mg  1,000 mg Oral DAILY    cholecalciferol (VITAMIN D3) (1000 Units /25 mcg) tablet 1,000 Units  1,000 Units Oral DAILY    zinc sulfate (ZINCATE) 50 mg zinc (220 mg) capsule 1 Capsule  1 Capsule Oral DAILY    budesonide-formoterol (SYMBICORT) 80-4.5 mcg inhaler  2 Puff Inhalation BID RT    sodium chloride (NS) flush 5-40 mL  5-40 mL IntraVENous Q8H    polyethylene glycol (MIRALAX) packet 17 g  17 g Oral DAILY    enoxaparin (LOVENOX) injection 40 mg  40 mg SubCUTAneous DAILY    dexamethasone (DECADRON) 20 mg in 0.9% sodium chloride 50 mL IVPB  20 mg IntraVENous Q24H    [START ON 2022] dexamethasone (PF) (DECADRON) 10 mg/mL injection 10 mg  10 mg IntraVENous Q24H    insulin lispro (HUMALOG) injection   SubCUTAneous AC&HS    buPROPion XL (WELLBUTRIN XL) tablet 300 mg  300 mg Oral 7am    FLUoxetine (PROzac) capsule 40 mg  40 mg Oral DAILY    levothyroxine (SYNTHROID) tablet 100 mcg  100 mcg Oral ACB    losartan (COZAAR) tablet 100 mg  100 mg Oral DAILY    pantoprazole (PROTONIX) tablet 40 mg  40 mg Oral DAILY    QUEtiapine (SEROquel) tablet 100 mg  100 mg Oral QHS    dilTIAZem ER (CARDIZEM CD) capsule 360 mg  360 mg Oral DAILY       Review of Systems:  A comprehensive review of systems was negative except for: Respiratory: positive for cough or dyspnea on exertion    Objective:   Vital Signs: Visit Vitals  /63 (BP 1 Location: Right upper arm, BP Patient Position: At rest)   Pulse (!) 54   Temp 98.5 °F (36.9 °C)   Resp 17   Ht 5' 5\" (1.651 m)   Wt 99.3 kg (219 lb)   SpO2 97%   Breastfeeding No   BMI 36.44 kg/m²       O2 Device: Heated,Hi flow nasal cannula   O2 Flow Rate (L/min): 40 l/min   Temp (24hrs), Av.7 °F (37.1 °C), Min:98.5 °F (36.9 °C), Max:98.9 °F (37.2 °C)       Intake/Output:   Last shift:      No intake/output data recorded. Last 3 shifts:  1901 -  0700  In: 900 [P.O.:900]  Out: 400 [Urine:400]    Intake/Output Summary (Last 24 hours) at 2022 0836  Last data filed at 2022 1729  Gross per 24 hour   Intake 900 ml   Output    Net 900 ml      Physical Exam:   General:  Alert, cooperative, no distress, appears stated age. Head:  Normocephalic, without obvious abnormality, atraumatic. Eyes:     Nose:    Throat:    Neck:    Back:      Lungs:      Chest wall:  No tenderness or deformity.  No accessory muscle use   Heart:     Abdomen:      Extremities:    Pulses:    Skin:    Lymph nodes:    Neurologic: Grossly nonfocal     Data review:     Recent Results (from the past 24 hour(s))   GLUCOSE, POC    Collection Time: 22 11:50 AM   Result Value Ref Range    Glucose (POC) 114 65 - 117 mg/dL    Performed by Dru Aspen PCT    GLUCOSE, POC    Collection Time: 22  4:22 PM   Result Value Ref Range    Glucose (POC) 141 (H) 65 - 117 mg/dL    Performed by Dru Aspen PCT    GLUCOSE, POC    Collection Time: 22  9:36 PM   Result Value Ref Range    Glucose (POC) 114 65 - 117 mg/dL    Performed by [facility] PCT    MAGNESIUM    Collection Time: 22  4:30 AM   Result Value Ref Range    Magnesium 2.3 1.6 - 2.4 mg/dL   PHOSPHORUS    Collection Time: 22  4:30 AM   Result Value Ref Range    Phosphorus 4.4 2.6 - 4.7 MG/DL   METABOLIC PANEL, COMPREHENSIVE    Collection Time: 22  4:30 AM   Result Value Ref Range    Sodium 139 136 - 145 mmol/L    Potassium 4.1 3.5 - 5.1 mmol/L    Chloride 109 (H) 97 - 108 mmol/L    CO2 22 21 - 32 mmol/L    Anion gap 8 5 - 15 mmol/L    Glucose 180 (H) 65 - 100 mg/dL    BUN 36 (H) 6 - 20 MG/DL    Creatinine 1.82 (H) 0.55 - 1.02 MG/DL    BUN/Creatinine ratio 20 12 - 20      GFR est AA 34 (L) >60 ml/min/1.73m2    GFR est non-AA 28 (L) >60 ml/min/1.73m2    Calcium 8.8 8.5 - 10.1 MG/DL    Bilirubin, total 0.9 0.2 - 1.0 MG/DL    ALT (SGPT) 33 12 - 78 U/L    AST (SGOT) 39 (H) 15 - 37 U/L    Alk. phosphatase 82 45 - 117 U/L    Protein, total 6.2 (L) 6.4 - 8.2 g/dL    Albumin 2.2 (L) 3.5 - 5.0 g/dL    Globulin 4.0 2.0 - 4.0 g/dL    A-G Ratio 0.6 (L) 1.1 - 2.2     CBC WITH AUTOMATED DIFF    Collection Time: 01/06/22  4:30 AM   Result Value Ref Range    WBC 4.3 3.6 - 11.0 K/uL    RBC 4.02 3.80 - 5.20 M/uL    HGB 9.8 (L) 11.5 - 16.0 g/dL    HCT 31.7 (L) 35.0 - 47.0 %    MCV 78.9 (L) 80.0 - 99.0 FL    MCH 24.4 (L) 26.0 - 34.0 PG    MCHC 30.9 30.0 - 36.5 g/dL    RDW 16.1 (H) 11.5 - 14.5 %    PLATELET 725 038 - 843 K/uL    MPV 9.7 8.9 - 12.9 FL    NRBC 0.0 0  WBC    ABSOLUTE NRBC 0.00 0.00 - 0.01 K/uL    NEUTROPHILS 84 (H) 32 - 75 %    LYMPHOCYTES 12 12 - 49 %    MONOCYTES 3 (L) 5 - 13 %    EOSINOPHILS 0 0 - 7 %    BASOPHILS 0 0 - 1 %    IMMATURE GRANULOCYTES 1 (H) 0.0 - 0.5 %    ABS. NEUTROPHILS 3.6 1.8 - 8.0 K/UL    ABS. LYMPHOCYTES 0.5 (L) 0.8 - 3.5 K/UL    ABS. MONOCYTES 0.1 0.0 - 1.0 K/UL    ABS. EOSINOPHILS 0.0 0.0 - 0.4 K/UL    ABS. BASOPHILS 0.0 0.0 - 0.1 K/UL    ABS. IMM.  GRANS. 0.0 0.00 - 0.04 K/UL    DF AUTOMATED     GLUCOSE, POC    Collection Time: 01/06/22  7:44 AM   Result Value Ref Range    Glucose (POC) 188 (H) 65 - 117 mg/dL    Performed by Clem Be PCT        Imaging:  I have personally reviewed the patients radiographs and have reviewed the reports:  CXR and chest ct c/w covid pneumonia        Gisele Corral MD

## 2022-01-07 LAB
GLUCOSE BLD STRIP.AUTO-MCNC: 136 MG/DL (ref 65–117)
GLUCOSE BLD STRIP.AUTO-MCNC: 139 MG/DL (ref 65–117)
GLUCOSE BLD STRIP.AUTO-MCNC: 155 MG/DL (ref 65–117)
GLUCOSE BLD STRIP.AUTO-MCNC: 166 MG/DL (ref 65–117)
SERVICE CMNT-IMP: ABNORMAL

## 2022-01-07 PROCEDURE — 82962 GLUCOSE BLOOD TEST: CPT

## 2022-01-07 PROCEDURE — 97110 THERAPEUTIC EXERCISES: CPT

## 2022-01-07 PROCEDURE — 74011636637 HC RX REV CODE- 636/637: Performed by: INTERNAL MEDICINE

## 2022-01-07 PROCEDURE — 74011250637 HC RX REV CODE- 250/637: Performed by: INTERNAL MEDICINE

## 2022-01-07 PROCEDURE — 77010033711 HC HIGH FLOW OXYGEN

## 2022-01-07 PROCEDURE — 94640 AIRWAY INHALATION TREATMENT: CPT

## 2022-01-07 PROCEDURE — 74011250636 HC RX REV CODE- 250/636: Performed by: INTERNAL MEDICINE

## 2022-01-07 PROCEDURE — 74011000258 HC RX REV CODE- 258: Performed by: INTERNAL MEDICINE

## 2022-01-07 PROCEDURE — 65660000000 HC RM CCU STEPDOWN

## 2022-01-07 PROCEDURE — 97116 GAIT TRAINING THERAPY: CPT

## 2022-01-07 PROCEDURE — 74011000250 HC RX REV CODE- 250: Performed by: INTERNAL MEDICINE

## 2022-01-07 RX ADMIN — Medication 2 UNITS: at 08:31

## 2022-01-07 RX ADMIN — DILTIAZEM HYDROCHLORIDE 360 MG: 180 CAPSULE, COATED, EXTENDED RELEASE ORAL at 08:31

## 2022-01-07 RX ADMIN — BUDESONIDE AND FORMOTEROL FUMARATE DIHYDRATE 2 PUFF: 80; 4.5 AEROSOL RESPIRATORY (INHALATION) at 19:37

## 2022-01-07 RX ADMIN — PANTOPRAZOLE SODIUM 40 MG: 40 TABLET, DELAYED RELEASE ORAL at 08:31

## 2022-01-07 RX ADMIN — LEVOTHYROXINE SODIUM 100 MCG: 0.1 TABLET ORAL at 08:31

## 2022-01-07 RX ADMIN — ENOXAPARIN SODIUM 40 MG: 100 INJECTION SUBCUTANEOUS at 08:32

## 2022-01-07 RX ADMIN — GUAIFENESIN 600 MG: 600 TABLET, EXTENDED RELEASE ORAL at 16:59

## 2022-01-07 RX ADMIN — DEXAMETHASONE SODIUM PHOSPHATE 20 MG: 4 INJECTION, SOLUTION INTRAMUSCULAR; INTRAVENOUS at 21:58

## 2022-01-07 RX ADMIN — SODIUM CHLORIDE, PRESERVATIVE FREE 10 ML: 5 INJECTION INTRAVENOUS at 06:09

## 2022-01-07 RX ADMIN — QUETIAPINE FUMARATE 100 MG: 25 TABLET ORAL at 21:57

## 2022-01-07 RX ADMIN — Medication 1 CAPSULE: at 08:31

## 2022-01-07 RX ADMIN — Medication 1000 UNITS: at 08:31

## 2022-01-07 RX ADMIN — BUPROPION HYDROCHLORIDE 300 MG: 150 TABLET, EXTENDED RELEASE ORAL at 08:31

## 2022-01-07 RX ADMIN — SODIUM CHLORIDE, PRESERVATIVE FREE 10 ML: 5 INJECTION INTRAVENOUS at 15:05

## 2022-01-07 RX ADMIN — Medication 2 UNITS: at 16:58

## 2022-01-07 RX ADMIN — BUDESONIDE AND FORMOTEROL FUMARATE DIHYDRATE 2 PUFF: 80; 4.5 AEROSOL RESPIRATORY (INHALATION) at 08:51

## 2022-01-07 RX ADMIN — FLUOXETINE 40 MG: 20 CAPSULE ORAL at 08:31

## 2022-01-07 RX ADMIN — LOSARTAN POTASSIUM 100 MG: 100 TABLET, FILM COATED ORAL at 08:31

## 2022-01-07 RX ADMIN — SODIUM CHLORIDE, PRESERVATIVE FREE 10 ML: 5 INJECTION INTRAVENOUS at 21:59

## 2022-01-07 RX ADMIN — GUAIFENESIN 600 MG: 600 TABLET, EXTENDED RELEASE ORAL at 08:31

## 2022-01-07 RX ADMIN — OXYCODONE HYDROCHLORIDE AND ACETAMINOPHEN 1000 MG: 500 TABLET ORAL at 08:31

## 2022-01-07 NOTE — PROGRESS NOTES
Hospitalist Progress Note    NAME: Gela Mckeon   :  1960   MRN:  [id number]       Assessment / Plan:  COVID-19 pneumonia POA causing  Acute respiratory failure with hypoxia POA currently on 15 liters mid flow  Presents with 10 days increasing cough and BUSTOS              Diagnosed at [facility] 1 week ago  Came to emergency room with acutely worsening shortness of breath  Vaccinated x 2 in Sept-Oct 2021, no booster  CXR with Multilobar patchy airspace disease compatible with Covid pneumonia. CTA chest reviewed, bilateral ASD, no PE  O2 to maintain sats > 90%  Currently on 15 L from 25 L high flow yesterday  IV dexamethasone  as per hospital protocol  s/p tocilizumab   IV remdesevir not indicated, > 7 days since onset  Procalcitonin less than 0.05, unlikely to have superimposed bacterial pneumonia  CRP 22  Pulmonary following, input is appreciated  Symbicort added  IS and proning  Continue to improve on daily basis, continue to have a risk for deterioration    Prediabetes POA last hemoglobin A1c 6.1 in 2021  Not currently on treatment  At risk for hyperglycemia on steroids  Sliding scale insulin, check blood sugars  C/w scheduled insulin with steroids if hyperglycemic  Check hemoglobin A1c     Essential hypertension POA  LVH due to hypertension POA  History of TIA POA  Continue losartan,  Hold HCTZ and spironolactone for now due to blood pressure on the lower side  Continue diltiazem CD  Low-dose aspirin     Depression  Continue Seroquel, fluoxetine  Low-dose Ativan as needed  Constipation  Linzess as needed     Hypothyroidism POA  Continue levothyroxine        Obesity POA Body mass index is 36.44 kg/m².          DVT prophylaxis with lovenox     Code status: full code  NOK:       30.0 - 39.9 Obese / Body mass index is 36.44 kg/m². Estimated discharge date: January 10  Barriers: O2 requirements         Subjective:     Patient was seen and examined. No acute events overnight.     Discussed with RN overnight events. All patient's questions were answered. \"\" Feeling much better today    Review of Systems:  Symptom Y/N Comments  Symptom Y/N Comments   Fever/Chills n   Chest Pain n    Poor Appetite    Edema     Cough n   Abdominal Pain n    Sputum    Joint Pain     SOB/BUSTOS y  improving  Pruritis/Rash     Nausea/vomit n   Tolerating PT/OT     Diarrhea    Tolerating Diet y    Constipation    Other       Could NOT obtain due to:          Objective:     VITALS:   Last 24hrs VS reviewed since prior progress note. Most recent are:  Patient Vitals for the past 24 hrs:   Temp Pulse Resp BP SpO2   01/07/22 1040 98.4 °F (36.9 °C) (!) 55 23 (!) 113/54 94 %   01/07/22 0852     92 %   01/07/22 0831  (!) 56      01/07/22 0728 98.5 °F (36.9 °C) (!) 49 26 105/62 95 %   01/07/22 0607  (!) 45 22 (!) 101/58 97 %   01/07/22 0429     95 %   01/07/22 0214     96 %   01/06/22 2355  (!) 45 24 (!) 101/51 95 %   01/06/22 2113     93 %   01/06/22 2017 99.2 °F (37.3 °C) (!) 52 21 (!) 110/46 95 %   01/06/22 1538     92 %   01/06/22 1400 99 °F (37.2 °C) (!) 57 29 (!) 124/57 90 %       Intake/Output Summary (Last 24 hours) at 1/7/2022 1153  Last data filed at 1/7/2022 1040  Gross per 24 hour   Intake 1050 ml   Output 1200 ml   Net -150 ml        I had a face to face encounter and independently examined this patient on 1/7/2022, as outlined below:  PHYSICAL EXAM:  General: WD, WN. Alert, cooperative, no acute distress    EENT:  EOMI. Anicteric sclerae. MMM  Resp:  CTA bilaterally, no wheezing or rales. No accessory muscle use  CV:  Regular  rhythm,  No edema  GI:  Soft, Non distended, Non tender. +Bowel sounds  Neurologic:  Alert and oriented X 3, normal speech,   Psych:   Good insight. Not anxious nor agitated  Skin:  No rashes.   No jaundice    Reviewed most current lab test results and cultures  YES  Reviewed most current radiology test results   YES  Review and summation of old records today NO  Reviewed patient's current orders and MAR    YES  PMH/SH reviewed - no change compared to H&P  ________________________________________________________________________  Care Plan discussed with:    Comments   Patient     Family      RN     Care Manager     Consultant                        Multidiciplinary team rounds were held today with , nursing, pharmacist and clinical coordinator. Patient's plan of care was discussed; medications were reviewed and discharge planning was addressed. ________________________________________________________________________  Total NON critical care TIME:  37 Minutes    Total CRITICAL CARE TIME Spent:   Minutes non procedure based      Comments   >50% of visit spent in counseling and coordination of care     ________________________________________________________________________  Mary Matute MD     Procedures: see electronic medical records for all procedures/Xrays and details which were not copied into this note but were reviewed prior to creation of Plan. LABS:  I reviewed today's most current labs and imaging studies.   Pertinent labs include:  Recent Labs     01/06/22  0430 01/05/22 0217   WBC 4.3 4.6   HGB 9.8* 10.1*   HCT 31.7* 32.1*    254     Recent Labs     01/06/22  0430 01/05/22 0217    140   K 4.1 3.9   * 110*   CO2 22 22   * 153*   BUN 36* 24*   CREA 1.82* 1.45*   CA 8.8 8.8   MG 2.3  --    PHOS 4.4  --    ALB 2.2* 2.2*   TBILI 0.9 0.4   ALT 33 26       Signed: Mary Matute MD

## 2022-01-07 NOTE — PROGRESS NOTES
0700- Report given to Andrew Boss RN by off going nurse. 200- Pt assisted up to recliner with standby assistance. 1230- Pt assisted back to bed. 1900- Report given to oncoming nurse by Andrew Boss RN.

## 2022-01-07 NOTE — PROGRESS NOTES
Spiritual Care Assessment/Progress Note  [facility]      NAME: Jose Nicholas      MRN: [id number]  AGE: 64 y.o.  SEX: female  Hinduism Affiliation: Walter   Language: English     1/7/2022     Total Time (in minutes): 18     Spiritual Assessment begun in [facility] PROGRESSIVE CARE through conversation with:         [x]Patient        [] Family    [] Friend(s)        Reason for Consult: Initial/Spiritual assessment, patient floor     Spiritual beliefs: (Please include comment if needed)     [] Identifies with a kelli tradition:         [] Supported by a kelli community:            [] Claims no spiritual orientation:           [] Seeking spiritual identity:                [] Adheres to an individual form of spirituality:           [] Not able to assess:                           Identified resources for coping:      [x] Prayer                               [] Music                  [] Guided Imagery     [x] Family/friends                 [] Pet visits     [] Devotional reading                         [] Unknown     [] Other:                                               Interventions offered during this visit: (See comments for more details)    Patient Interventions: Affirmation of emotions/emotional suffering,Life review/legacy,Initial/Spiritual assessment, patient floor,Coping skills reviewed/reinforced,Normalization of emotional/spiritual concerns,Hinduism beliefs/image of God discussed,Prayer (assurance of),Integration of medical assessment with existing values and beliefs,Affirmation of kelli,Catharsis/review of pertinent events in supportive environment,Iconic (affirming the presence of God/Higher Power)           Plan of Care:     [] Support spiritual and/or cultural needs    [] Support AMD and/or advance care planning process      [] Support grieving process   [] Coordinate Rites and/or Rituals    [] Coordination with community clergy   [] No spiritual needs identified at this time   [] Detailed Plan of Care below (See Comments)  [] Make referral to Music Therapy  [] Make referral to Pet Therapy     [] Make referral to Addiction services  [] Make referral to [facility]  [] Make referral to Spiritual Care Partner  [] No future visits requested        [x] Contact Spiritual Care for further referrals     Comments:  Patient on contact isolation,  called her room for initial spiritual assessment. Patient very receptive to call and engaged in conversation. She shared she was doing a little better, though struggling brenna isolation. Several members of their family struggling with same medical condition. She shared that kelli is very important for coping. Her kelli has brought her through multiple situations. They have formed a prayer chain to lift each other up in prayer.  listened actively and affirmed thoughts and emotions and assured patient of his prayers. Please contact spiritual care with any further referrals. Visited by: Rickey Osman.    Paging Service: [phone])

## 2022-01-07 NOTE — PROGRESS NOTES
Problem: Mobility Impaired (Adult and Pediatric)  Goal: *Acute Goals and Plan of Care (Insert Text)  Description: FUNCTIONAL STATUS PRIOR TO ADMISSION: Patient ambulated indep in community and was driving    [address]: Patient was indep with ADLs    Physical Therapy Goals  Initiated 1/5/2022  1. Patient will move from supine to sit and sit to supine  in bed with modified independence within 7 day(s). 2.  Patient will transfer from bed to chair and chair to bed with modified independence using the least restrictive device within 7 day(s). 3.  Patient will perform sit to stand with modified independence within 7 day(s). 4.  Patient will ambulate with modified independence for 100 feet with the least restrictive device within 7 day(s). Outcome: Progressing Towards Goal   PHYSICAL THERAPY TREATMENT  Patient: Makayla Barraza (25 y.o. female)  Date: 1/7/2022  Diagnosis: Pneumonia due to COVID-19 virus [U07.1, J12.82] <principal problem not specified>       Precautions:    Chart, physical therapy assessment, plan of care and goals were reviewed. ASSESSMENT  Patient continues with skilled PT services and is progressing towards goals. Patient received up in chair and agreeable to participate, on 15 liters high flow with sats in low 90s. Patient with good tolerance of seated ther ex for strengthening and trunk stretching, then ambulated x 15 feet with hand held assist.  Sats dropped briefly to 82%, but recovered within one minute using PLB. Patient able to demonstrate correct use of spirometer and sats improved to 97%. Educated on energy conservation and gave written hand out. Patient left in supine with call bell in reach.     Pulse Oximetry Assessment    93% at rest on 15 LPM  82 % while ambulating on 15 LPM     Current Level of Function Impacting Discharge (mobility/balance): hand held assist to ambulate    Other factors to consider for discharge: home 02 needs, will require family assist         PLAN :  Patient continues to benefit from skilled intervention to address the above impairments. Continue treatment per established plan of care. to address goals. Recommendation for discharge: (in order for the patient to meet his/her long term goals)  Physical therapy at least 2 days/week in the home AND ensure assist and/or supervision for safety with mobility    This discharge recommendation:  Has been made in collaboration with the attending provider and/or case management    IF patient discharges home will need the following DME: none       SUBJECTIVE:   Patient stated how long will I need the oxygen? Lorezno Jose    OBJECTIVE DATA SUMMARY:   Critical Behavior:  Neurologic State: Alert  Orientation Level: Oriented X4  Cognition: Appropriate decision making,Follows commands  Safety/Judgement: Awareness of environment,Home safety,Insight into deficits  Functional Mobility Training:  Bed Mobility:                    Transfers:  Sit to Stand: Stand-by assistance  Stand to Sit: Stand-by assistance        Bed to Chair: Contact guard assistance                    Balance:  Sitting: Intact  Standing: Impaired  Standing - Static: Good  Standing - Dynamic : Occasional  Ambulation/Gait Training:  Distance (ft): 15 Feet (ft)     Ambulation - Level of Assistance: Contact guard assistance        Gait Abnormalities: Decreased step clearance              Speed/Nati: Pace decreased (<100 feet/min)  Step Length: Left shortened;Right shortened                    Stairs: Therapeutic Exercises: Ankle pumps, LAQ, trunk rotations, lateral stretching  Pain Rating:      Activity Tolerance:   Fair, desaturates with exertion and requires oxygen, requires rest breaks, and observed SOB with activity    After treatment patient left in no apparent distress:   Supine in bed, Call bell within reach, and Side rails x 3    COMMUNICATION/COLLABORATION:   The patients plan of care was discussed with: Registered nurse. Tamar Smith, PT   Time Calculation: 26 mins

## 2022-01-07 NOTE — PROGRESS NOTES
Transition of Care Plan:     RUR: 11%  Disposition: Home with spouse and HH  Follow up appointments:  PCP, Specialists  DME needed: home oxygen? Transportation at Discharge: spouse  Keys or means to access home:  yes      IM Medicare Letter: n/a  Is patient a BCPI-A Bundle:       n/a               If yes, was Bundle Letter given?:    Is patient a [place] and connected with the [place]?  n/a  If yes, was [employer] transfer form completed and [facility] notified? Caregiver Contact:  Ranjeet Espitia [phone]  Discharge Caregiver contacted prior to discharge? CM will contact prior to d/c if pt desires. CM reviewed chart. Pt's JOVANI is 1/10/22. Therapy is recommending HH at d/c.  CM called and spoke to pt via phone to discuss. Pt does not have a preference. [address] verbally reviewed by phone. [facility].     ISAAC Easley  Care Management, [address]

## 2022-01-07 NOTE — PROGRESS NOTES
01/07/22 0728   Vitals   Temp 98.5 °F (36.9 °C)   Temp Source Oral   Pulse (Heart Rate) (!) 49   Heart Rate Source Monitor   Resp Rate 26   O2 Sat (%) 95 %   Level of Consciousness Alert (0)   /62   MAP (Calculated) 76   BP 1 Location Right upper arm   BP 1 Method Automatic   BP Patient Position At rest   Cardiac Rhythm Sinus Pj   MEWS Score 3   Pain 1   Pain Scale 1 Numeric (0 - 10)   Pain Intensity 1 0   POSS Scale   Opioid Sedation Scale 1   Oxygen Therapy   O2 Device Heated; Hi flow nasal cannula   FIO2 (%) 100 %   MEWS elevated d/t tachypnea and bradycardia. Will monitor.

## 2022-01-07 NOTE — PROGRESS NOTES
Problem: Risk for Spread of Infection  Goal: Prevent transmission of infectious organism to others  Description: Prevent the transmission of infectious organisms to other patients, staff members, and visitors. Outcome: Progressing Towards Goal     Problem: Patient Education:  Go to Education Activity  Goal: Patient/Family Education  Outcome: Progressing Towards Goal     Problem: Falls - Risk of  Goal: *Absence of Falls  Description: Document Sondra Alexbrooksorly Fall Risk and appropriate interventions in the flowsheet.   Outcome: Progressing Towards Goal  Note: Fall Risk Interventions:  Mobility Interventions: Assess mobility with egress test,Bed/chair exit alarm,Patient to call before getting OOB,PT Consult for mobility concerns,PT Consult for assist device competence,Utilize walker, cane, or other assistive device         Medication Interventions: Assess postural VS orthostatic hypotension,Bed/chair exit alarm,Patient to call before getting OOB,Teach patient to arise slowly    Elimination Interventions: Bed/chair exit alarm,Call light in reach,Patient to call for help with toileting needs,Toileting schedule/hourly rounds

## 2022-01-07 NOTE — PROGRESS NOTES
Pulmonary, Critical Care, and Sleep Medicine      Name: Anjana Weber MRN: [id number]   : 1960 Hospital: [facility]   Date: 2022        IMPRESSION:   · Acute respiratory failure  · COVID +  · COVID pneumonia       RECOMMENDATIONS:   · On HF O2  · On decadron  · S/p actemra  · DVT prophylaxis  · procalcitonin low, stop abx?   · Vaccinated x 2   · Will f/u monday     Subjective:     No acute events overnight  Still on HF O2  No severe distress  Discussed with nursing and respiratory       Current Facility-Administered Medications   Medication Dose Route Frequency    guaiFENesin ER (MUCINEX) tablet 600 mg  600 mg Oral BID    ascorbic acid (vitamin C) (VITAMIN C) tablet 1,000 mg  1,000 mg Oral DAILY    cholecalciferol (VITAMIN D3) (1000 Units /25 mcg) tablet 1,000 Units  1,000 Units Oral DAILY    zinc sulfate (ZINCATE) 50 mg zinc (220 mg) capsule 1 Capsule  1 Capsule Oral DAILY    budesonide-formoterol (SYMBICORT) 80-4.5 mcg inhaler  2 Puff Inhalation BID RT    sodium chloride (NS) flush 5-40 mL  5-40 mL IntraVENous Q8H    polyethylene glycol (MIRALAX) packet 17 g  17 g Oral DAILY    enoxaparin (LOVENOX) injection 40 mg  40 mg SubCUTAneous DAILY    dexamethasone (DECADRON) 20 mg in 0.9% sodium chloride 50 mL IVPB  20 mg IntraVENous Q24H    [START ON 2022] dexamethasone (PF) (DECADRON) 10 mg/mL injection 10 mg  10 mg IntraVENous Q24H    insulin lispro (HUMALOG) injection   SubCUTAneous AC&HS    buPROPion XL (WELLBUTRIN XL) tablet 300 mg  300 mg Oral 7am    FLUoxetine (PROzac) capsule 40 mg  40 mg Oral DAILY    levothyroxine (SYNTHROID) tablet 100 mcg  100 mcg Oral ACB    losartan (COZAAR) tablet 100 mg  100 mg Oral DAILY    pantoprazole (PROTONIX) tablet 40 mg  40 mg Oral DAILY    QUEtiapine (SEROquel) tablet 100 mg  100 mg Oral QHS    dilTIAZem ER (CARDIZEM CD) capsule 360 mg  360 mg Oral DAILY       Review of Systems:  A comprehensive review of systems was negative except for: Respiratory: positive for cough or dyspnea on exertion    Objective:   Vital Signs:    Visit Vitals  /62 (BP 1 Location: Right upper arm, BP Patient Position: At rest)   Pulse (!) 49   Temp 98.5 °F (36.9 °C)   Resp 26   Ht 5' 5\" (1.651 m)   Wt 99.3 kg (219 lb)   SpO2 95%   Breastfeeding No   BMI 36.44 kg/m²       O2 Device: Heated,Hi flow nasal cannula   O2 Flow Rate (L/min): 20 l/min   Temp (24hrs), Av.9 °F (37.2 °C), Min:98.5 °F (36.9 °C), Max:99.2 °F (37.3 °C)       Intake/Output:   Last shift:      No intake/output data recorded. Last 3 shifts:  1901 -  0700  In: 950 [P.O.:900; I.V.:50]  Out: 800 [Urine:800]    Intake/Output Summary (Last 24 hours) at 2022 0825  Last data filed at 2022 0429  Gross per 24 hour   Intake 950 ml   Output 800 ml   Net 150 ml      Physical Exam:   General:  Alert, cooperative, no distress, appears stated age. Head:  Normocephalic, without obvious abnormality, atraumatic. Eyes:     Nose:    Throat:    Neck:    Back:      Lungs:      Chest wall:  No tenderness or deformity.  No accessory muscle use   Heart:     Abdomen:      Extremities:    Pulses:    Skin:    Lymph nodes:    Neurologic: Grossly nonfocal     Data review:     Recent Results (from the past 24 hour(s))   GLUCOSE, POC    Collection Time: 22 12:00 PM   Result Value Ref Range    Glucose (POC) 181 (H) 65 - 117 mg/dL    Performed by Jamar Potts RN    PROCALCITONIN    Collection Time: 22  1:00 PM   Result Value Ref Range    Procalcitonin <0.05 ng/mL   GLUCOSE, POC    Collection Time: 22  5:06 PM   Result Value Ref Range    Glucose (POC) 130 (H) 65 - 117 mg/dL    Performed by [address] [place] [address], POC    Collection Time: 22  8:53 PM   Result Value Ref Range    Glucose (POC) 154 (H) 65 - 117 mg/dL    Performed by Mark Hoover (EVELYN GALE)    GLUCOSE, POC    Collection Time: 22  9:39 PM   Result Value Ref Range    Glucose (POC) 162 (H) 65 - 117 mg/dL Performed by Bal Arteaga RN    GLUCOSE, POC    Collection Time: 01/07/22  7:29 AM   Result Value Ref Range    Glucose (POC) 166 (H) 65 - 117 mg/dL    Performed by Andrez Cramer PCT        Imaging:  I have personally reviewed the patients radiographs and have reviewed the reports:          Izabella Humphries MD

## 2022-01-08 LAB
ANION GAP SERPL CALC-SCNC: 8 MMOL/L (ref 5–15)
BUN SERPL-MCNC: 53 MG/DL (ref 6–20)
BUN/CREAT SERPL: 29 (ref 12–20)
CALCIUM SERPL-MCNC: 9 MG/DL (ref 8.5–10.1)
CHLORIDE SERPL-SCNC: 108 MMOL/L (ref 97–108)
CO2 SERPL-SCNC: 22 MMOL/L (ref 21–32)
CREAT SERPL-MCNC: 1.84 MG/DL (ref 0.55–1.02)
ERYTHROCYTE [DISTWIDTH] IN BLOOD BY AUTOMATED COUNT: 16.7 % (ref 11.5–14.5)
GLUCOSE BLD STRIP.AUTO-MCNC: 158 MG/DL (ref 65–117)
GLUCOSE BLD STRIP.AUTO-MCNC: 161 MG/DL (ref 65–117)
GLUCOSE BLD STRIP.AUTO-MCNC: 165 MG/DL (ref 65–117)
GLUCOSE BLD STRIP.AUTO-MCNC: 184 MG/DL (ref 65–117)
GLUCOSE SERPL-MCNC: 179 MG/DL (ref 65–100)
HCT VFR BLD AUTO: 31.7 % (ref 35–47)
HGB BLD-MCNC: 9.8 G/DL (ref 11.5–16)
MCH RBC QN AUTO: 24.3 PG (ref 26–34)
MCHC RBC AUTO-ENTMCNC: 30.9 G/DL (ref 30–36.5)
MCV RBC AUTO: 78.7 FL (ref 80–99)
NRBC # BLD: 0 K/UL (ref 0–0.01)
NRBC BLD-RTO: 0 PER 100 WBC
PLATELET # BLD AUTO: 314 K/UL (ref 150–400)
PMV BLD AUTO: 10.1 FL (ref 8.9–12.9)
POTASSIUM SERPL-SCNC: 4.5 MMOL/L (ref 3.5–5.1)
RBC # BLD AUTO: 4.03 M/UL (ref 3.8–5.2)
SERVICE CMNT-IMP: ABNORMAL
SODIUM SERPL-SCNC: 138 MMOL/L (ref 136–145)
WBC # BLD AUTO: 8.7 K/UL (ref 3.6–11)

## 2022-01-08 PROCEDURE — 77010033711 HC HIGH FLOW OXYGEN

## 2022-01-08 PROCEDURE — 85027 COMPLETE CBC AUTOMATED: CPT

## 2022-01-08 PROCEDURE — 80048 BASIC METABOLIC PNL TOTAL CA: CPT

## 2022-01-08 PROCEDURE — 74011250637 HC RX REV CODE- 250/637: Performed by: INTERNAL MEDICINE

## 2022-01-08 PROCEDURE — 36415 COLL VENOUS BLD VENIPUNCTURE: CPT

## 2022-01-08 PROCEDURE — 82962 GLUCOSE BLOOD TEST: CPT

## 2022-01-08 PROCEDURE — 74011000258 HC RX REV CODE- 258: Performed by: INTERNAL MEDICINE

## 2022-01-08 PROCEDURE — 74011000250 HC RX REV CODE- 250: Performed by: INTERNAL MEDICINE

## 2022-01-08 PROCEDURE — 94640 AIRWAY INHALATION TREATMENT: CPT

## 2022-01-08 PROCEDURE — 74011636637 HC RX REV CODE- 636/637: Performed by: INTERNAL MEDICINE

## 2022-01-08 PROCEDURE — 74011250636 HC RX REV CODE- 250/636: Performed by: INTERNAL MEDICINE

## 2022-01-08 PROCEDURE — 65660000000 HC RM CCU STEPDOWN

## 2022-01-08 RX ADMIN — SODIUM CHLORIDE, PRESERVATIVE FREE 10 ML: 5 INJECTION INTRAVENOUS at 21:20

## 2022-01-08 RX ADMIN — GUAIFENESIN 600 MG: 600 TABLET, EXTENDED RELEASE ORAL at 17:07

## 2022-01-08 RX ADMIN — Medication 1000 UNITS: at 08:31

## 2022-01-08 RX ADMIN — BUDESONIDE AND FORMOTEROL FUMARATE DIHYDRATE 2 PUFF: 80; 4.5 AEROSOL RESPIRATORY (INHALATION) at 08:42

## 2022-01-08 RX ADMIN — LEVOTHYROXINE SODIUM 100 MCG: 0.1 TABLET ORAL at 08:31

## 2022-01-08 RX ADMIN — ENOXAPARIN SODIUM 40 MG: 100 INJECTION SUBCUTANEOUS at 08:31

## 2022-01-08 RX ADMIN — Medication 1 CAPSULE: at 08:31

## 2022-01-08 RX ADMIN — Medication 2 UNITS: at 17:06

## 2022-01-08 RX ADMIN — PANTOPRAZOLE SODIUM 40 MG: 40 TABLET, DELAYED RELEASE ORAL at 08:32

## 2022-01-08 RX ADMIN — BUPROPION HYDROCHLORIDE 300 MG: 150 TABLET, EXTENDED RELEASE ORAL at 08:31

## 2022-01-08 RX ADMIN — FLUOXETINE 40 MG: 20 CAPSULE ORAL at 08:31

## 2022-01-08 RX ADMIN — QUETIAPINE FUMARATE 100 MG: 25 TABLET ORAL at 21:20

## 2022-01-08 RX ADMIN — GUAIFENESIN 600 MG: 600 TABLET, EXTENDED RELEASE ORAL at 08:31

## 2022-01-08 RX ADMIN — DEXAMETHASONE SODIUM PHOSPHATE 20 MG: 4 INJECTION, SOLUTION INTRAMUSCULAR; INTRAVENOUS at 17:46

## 2022-01-08 RX ADMIN — BUDESONIDE AND FORMOTEROL FUMARATE DIHYDRATE 2 PUFF: 80; 4.5 AEROSOL RESPIRATORY (INHALATION) at 19:43

## 2022-01-08 RX ADMIN — SODIUM CHLORIDE, PRESERVATIVE FREE 10 ML: 5 INJECTION INTRAVENOUS at 14:00

## 2022-01-08 RX ADMIN — OXYCODONE HYDROCHLORIDE AND ACETAMINOPHEN 1000 MG: 500 TABLET ORAL at 08:31

## 2022-01-08 RX ADMIN — Medication 2 UNITS: at 08:32

## 2022-01-08 NOTE — PROGRESS NOTES
2030 Bedside and Verbal shift change report given to Marilee Mandujano (oncoming nurse) by Wilfred Giles RN (offgoing nurse). Report included the following information SBAR, Kardex, ED Summary, Procedure Summary, Intake/Output, MAR, Recent Results and Cardiac Rhythm Sinus pj. End of Shift Note    0700 Bedside shift change report given to [address] [place] [address] (oncoming nurse) by Isabell Hancock RN (offgoing nurse). Report included the following information SBAR, Kardex, ED Summary, OR Summary, MAR and Cardiac Rhythm Sinus pj    Shift worked:  [id number]     Shift summary and any significant changes:     none     Concerns for physician to address:  none     Zone phone for oncoming shift:   [phone]       Activity:  Activity Level: Up with Assistance  Number times ambulated in hallways past shift: 0  Number of times OOB to chair past shift: 0    Cardiac:   Cardiac Monitoring: Yes      Cardiac Rhythm: Sinus Pj    Access:   Current line(s): PIV     Genitourinary:   Urinary status: voiding    Respiratory:   O2 Device: Hi flow nasal cannula  Chronic home O2 use?: NO  Incentive spirometer at bedside: YES     GI:     Current diet:  ADULT DIET Regular  Passing flatus: YES  Tolerating current diet: YES       Pain Management:   Patient states pain is manageable on current regimen: YES    Skin:  Robin Score: 20  Interventions: speciality bed    Patient Safety:  Fall Score:  Total Score: 3  Interventions: bed/chair alarm and gripper socks  High Fall Risk: Yes    Length of Stay:  Expected LOS: - - -  Actual LOS: 4      Isabell Hancock RN

## 2022-01-08 NOTE — PROGRESS NOTES
Hospitalist Progress Note    NAME: Xochitl Herbert   :  1960   MRN:  [id number]       Assessment / Plan:  COVID-19 pneumonia POA causing  Acute respiratory failure with hypoxia POA currently on 15 liters mid flow  Presents with 10 days increasing cough and BUSTOS              Diagnosed at [facility] 1 week ago  Came to emergency room with acutely worsening shortness of breath  Vaccinated x 2 in Sept-Oct 2021, no booster  CXR with multilobar patchy airspace disease compatible with Covid pneumonia. CTA chest reviewed, bilateral ASD, no PE  O2 to maintain sats > 90%  Currently on 15 L from 25 L high flow yesterday  IV dexamethasone  as per hospital protocol  s/p tocilizumab   IV remdesevir not indicated, > 7 days since onset  Procalcitonin less than 0.05, unlikely to have superimposed bacterial pneumonia  CRP 22  Pulmonary following, input is appreciated  Symbicort   IS and proning  Continue to improve on daily basis, continue to have a risk for deterioration  Currently on 6L/min. Cont' to wean as tolerated  Updated pt's  via phone . Questions/concerns addressed     Prediabetes POA last hemoglobin A1c 6.1 in 2021  Not currently on treatment  At risk for hyperglycemia on steroids  Sliding scale insulin, check blood sugars  C/w scheduled insulin with steroids if hyperglycemic     Essential hypertension POA  LVH due to hypertension POA  History of TIA, POA  BP running low ~ systolic pressure  Hold losartan, HCTZ and spironolactone  Continue diltiazem CD with holding parameters. Low-dose aspirin     Depression  Continue Seroquel, fluoxetine  Low-dose Ativan as needed  Constipation  Linzess as needed     Hypothyroidism POA  Continue levothyroxine        Obesity POA Body mass index is 36.44 kg/m².          DVT prophylaxis with lovenox     Code status: full code  NOK:       30.0 - 39.9 Obese / Body mass index is 36.44 kg/m².     Estimated discharge date: January 10  Barriers: O2 requirements         Subjective:     Patient was seen and examined. No acute events overnight. Pt is resting comfortably in bed, wakes up to voice. Has been weaned down to 6L/min. BP running low with HR low. Discussed with RN overnight events. Review of Systems:  Symptom Y/N Comments  Symptom Y/N Comments   Fever/Chills n   Chest Pain n    Poor Appetite    Edema     Cough n   Abdominal Pain n    Sputum    Joint Pain     SOB/BUSTOS y  improving  Pruritis/Rash     Nausea/vomit n   Tolerating PT/OT     Diarrhea    Tolerating Diet y    Constipation    Other       Could NOT obtain due to:          Objective:     VITALS:   Last 24hrs VS reviewed since prior progress note. Most recent are:  Patient Vitals for the past 24 hrs:   Temp Pulse Resp BP SpO2   01/08/22 1109 98.1 °F (36.7 °C) (!) 53 21 (!) 103/51 96 %   01/08/22 0842     92 %   01/08/22 0742 98.7 °F (37.1 °C) (!) 45 20 (!) 99/47 91 %   01/08/22 0612     92 %   01/08/22 0423  (!) 43 20 (!) 93/48 95 %   01/08/22 0202     97 %   01/08/22 0041  (!) 50 14 107/60 95 %   01/07/22 2048 98 °F (36.7 °C) (!) 49 22 (!) 103/53 93 %   01/07/22 1937     92 %   01/07/22 1428 98.8 °F (37.1 °C) (!) 49 20 103/65 94 %     No intake or output data in the 24 hours ending 01/08/22 1317     I had a face to face encounter and independently examined this patient on 1/8/2022, as outlined below:  PHYSICAL EXAM:  General: Resting, NAD  EENT:  EOMI. Anicteric sclerae. MMM  Resp:  No accessory muscle use  CV:  Regular  rhythm,  No edema  GI:  NT, ND. Neurologic:  Somnolent but wakes up to voice. Follow commands. Skin:  No rashes.   No jaundice    Reviewed most current lab test results and cultures  YES  Reviewed most current radiology test results   YES  Review and summation of old records today    NO  Reviewed patient's current orders and MAR    YES  PMH/SH reviewed - no change compared to H&P  ________________________________________________________________________  Care Plan discussed with:    Comments   Patient x    Family  x Pt's    RN x    Care Manager     Consultant                        Multidiciplinary team rounds were held today with , nursing, pharmacist and clinical coordinator. Patient's plan of care was discussed; medications were reviewed and discharge planning was addressed. ________________________________________________________________________  Total NON critical care TIME:  35Minutes    Total CRITICAL CARE TIME Spent:   Minutes non procedure based      Comments   >50% of visit spent in counseling and coordination of care     ________________________________________________________________________  Alec Roth MD     Procedures: see electronic medical records for all procedures/Xrays and details which were not copied into this note but were reviewed prior to creation of Plan. LABS:  I reviewed today's most current labs and imaging studies.   Pertinent labs include:  Recent Labs     01/08/22  0542 01/06/22  0430   WBC 8.7 4.3   HGB 9.8* 9.8*   HCT 31.7* 31.7*    288     Recent Labs     01/08/22  0542 01/06/22  0430    139   K 4.5 4.1    109*   CO2 22 22   * 180*   BUN 53* 36*   CREA 1.84* 1.82*   CA 9.0 8.8   MG  --  2.3   PHOS  --  4.4   ALB  --  2.2*   TBILI  --  0.9   ALT  --  33       Signed: Alec Roth MD

## 2022-01-08 NOTE — PROGRESS NOTES
Transition of Care Plan:     RUR: 11%  Disposition: Home with  HH  Follow up appointments:  PCP, Specialists  DME needed: TBD  Transportation at Discharge: Spouse  Keys or means to access home:  yes      IM Medicare Letter: n/a  Is patient a BCPI-A Bundle:       n/a               If yes, was Bundle Letter given?:    Is patient a [place] and connected with [facility]?  n/a  If yes, was [employer] transfer form completed and [facility] notified? Caregiver Contact:  Gregory Bowie [phone]  Discharge Caregiver contacted prior to discharge?    CM will contact prior to d/c if pt desires. Patient was denied for [facility] by [facility], new referral sent to [facility] [employer] [employer], [facility] and [facility]. CM will continue to follow patient for discharge planning needs and arrange for services as deemed necessary.      Morena Gonzalez, MSN, RN  Care Manager

## 2022-01-09 LAB
GLUCOSE BLD STRIP.AUTO-MCNC: 152 MG/DL (ref 65–117)
GLUCOSE BLD STRIP.AUTO-MCNC: 157 MG/DL (ref 65–117)
GLUCOSE BLD STRIP.AUTO-MCNC: 159 MG/DL (ref 65–117)
GLUCOSE BLD STRIP.AUTO-MCNC: 168 MG/DL (ref 65–117)
SERVICE CMNT-IMP: ABNORMAL

## 2022-01-09 PROCEDURE — 74011000250 HC RX REV CODE- 250: Performed by: INTERNAL MEDICINE

## 2022-01-09 PROCEDURE — 94640 AIRWAY INHALATION TREATMENT: CPT

## 2022-01-09 PROCEDURE — 74011250637 HC RX REV CODE- 250/637: Performed by: INTERNAL MEDICINE

## 2022-01-09 PROCEDURE — 74011250636 HC RX REV CODE- 250/636: Performed by: INTERNAL MEDICINE

## 2022-01-09 PROCEDURE — 77010033678 HC OXYGEN DAILY

## 2022-01-09 PROCEDURE — 74011636637 HC RX REV CODE- 636/637: Performed by: INTERNAL MEDICINE

## 2022-01-09 PROCEDURE — 82962 GLUCOSE BLOOD TEST: CPT

## 2022-01-09 PROCEDURE — 65660000000 HC RM CCU STEPDOWN

## 2022-01-09 RX ADMIN — GUAIFENESIN 600 MG: 600 TABLET, EXTENDED RELEASE ORAL at 08:44

## 2022-01-09 RX ADMIN — SODIUM CHLORIDE, PRESERVATIVE FREE 10 ML: 5 INJECTION INTRAVENOUS at 21:56

## 2022-01-09 RX ADMIN — Medication 2 UNITS: at 12:31

## 2022-01-09 RX ADMIN — Medication 1 CAPSULE: at 08:44

## 2022-01-09 RX ADMIN — SODIUM CHLORIDE, PRESERVATIVE FREE 10 ML: 5 INJECTION INTRAVENOUS at 06:00

## 2022-01-09 RX ADMIN — Medication 1000 UNITS: at 08:44

## 2022-01-09 RX ADMIN — QUETIAPINE FUMARATE 100 MG: 25 TABLET ORAL at 21:56

## 2022-01-09 RX ADMIN — ENOXAPARIN SODIUM 40 MG: 100 INJECTION SUBCUTANEOUS at 08:44

## 2022-01-09 RX ADMIN — GUAIFENESIN 600 MG: 600 TABLET, EXTENDED RELEASE ORAL at 18:00

## 2022-01-09 RX ADMIN — Medication 2 UNITS: at 16:51

## 2022-01-09 RX ADMIN — FLUOXETINE 40 MG: 20 CAPSULE ORAL at 08:44

## 2022-01-09 RX ADMIN — BUDESONIDE AND FORMOTEROL FUMARATE DIHYDRATE 2 PUFF: 80; 4.5 AEROSOL RESPIRATORY (INHALATION) at 08:32

## 2022-01-09 RX ADMIN — BUDESONIDE AND FORMOTEROL FUMARATE DIHYDRATE 2 PUFF: 80; 4.5 AEROSOL RESPIRATORY (INHALATION) at 19:39

## 2022-01-09 RX ADMIN — BUPROPION HYDROCHLORIDE 300 MG: 150 TABLET, EXTENDED RELEASE ORAL at 08:44

## 2022-01-09 RX ADMIN — SODIUM CHLORIDE, PRESERVATIVE FREE 5 ML: 5 INJECTION INTRAVENOUS at 14:00

## 2022-01-09 RX ADMIN — Medication 2 UNITS: at 08:43

## 2022-01-09 RX ADMIN — OXYCODONE HYDROCHLORIDE AND ACETAMINOPHEN 1000 MG: 500 TABLET ORAL at 08:44

## 2022-01-09 RX ADMIN — DEXAMETHASONE SODIUM PHOSPHATE 10 MG: 10 INJECTION, SOLUTION INTRAMUSCULAR; INTRAVENOUS at 08:44

## 2022-01-09 RX ADMIN — PANTOPRAZOLE SODIUM 40 MG: 40 TABLET, DELAYED RELEASE ORAL at 08:45

## 2022-01-09 RX ADMIN — LEVOTHYROXINE SODIUM 100 MCG: 0.1 TABLET ORAL at 08:44

## 2022-01-09 NOTE — PROGRESS NOTES
1900 Bedside and Verbal shift change report given to [address] Mandujano (oncoming nurse) by Mina Awan RN (offgoing nurse). Report included the following information SBAR, Kardex, ED Summary, Procedure Summary, Intake/Output, MAR, Recent Results and Cardiac Rhythm Sinus pj. End of Shift Note    0700 Bedside shift change report given to [address] [place] [address] (oncoming nurse) by Billie Ortez RN (offgoing nurse). Report included the following information SBAR, Kardex, ED Summary, Procedure Summary, MAR and Cardiac Rhythm Sinus pj    Shift worked:  1813-2525     Shift summary and any significant changes:     none     Concerns for physician to address:  none     Zone phone for oncoming shift:   0740       Activity:  Activity Level: Up with Assistance  Number times ambulated in hallways past shift: 0  Number of times OOB to chair past shift: 0    Cardiac:   Cardiac Monitoring: Yes      Cardiac Rhythm: Sinus Pj    Access:   Current line(s): PIV     Genitourinary:   Urinary status: voiding    Respiratory:   O2 Device: Nasal cannula  Chronic home O2 use?: NO  Incentive spirometer at bedside: YES     GI:     Current diet:  ADULT DIET Regular  Passing flatus: YES  Tolerating current diet: YES       Pain Management:   Patient states pain is manageable on current regimen: YES    Skin:  Robin Score: 20  Interventions: increase time out of bed and nutritional support     Patient Safety:  Fall Score:  Total Score: 3  Interventions: gripper socks and pt to call before getting OOB  High Fall Risk: Yes    Length of Stay:  Expected LOS: - - -  Actual LOS: 5      Billie Ortez RN

## 2022-01-09 NOTE — PROGRESS NOTES
1- report received from Ericka Warren 468- reviewed and agree with assessment completed by Jaci Rene RN    1847- report given to oncoming RN

## 2022-01-09 NOTE — PROGRESS NOTES
Problem: Risk for Spread of Infection  Goal: Prevent transmission of infectious organism to others  Description: Prevent the transmission of infectious organisms to other patients, staff members, and visitors. Outcome: Progressing Towards Goal     Problem: Falls - Risk of  Goal: *Absence of Falls  Description: Document Tyler Josiane Fall Risk and appropriate interventions in the flowsheet.   Outcome: Progressing Towards Goal  Note: Fall Risk Interventions:  Mobility Interventions: Bed/chair exit alarm,Communicate number of staff needed for ambulation/transfer,Patient to call before getting OOB         Medication Interventions: Bed/chair exit alarm,Evaluate medications/consider consulting pharmacy,Patient to call before getting OOB    Elimination Interventions: Call light in reach,Patient to call for help with toileting needs              Problem: Gas Exchange - Impaired  Goal: Absence of hypoxia  Outcome: Progressing Towards Goal     Problem: Breathing Pattern - Ineffective  Goal: Ability to achieve and maintain a regular respiratory rate  Outcome: Progressing Towards Goal     Problem: Nutrition Deficits  Goal: Optimize nutrtional status  Outcome: Progressing Towards Goal

## 2022-01-09 NOTE — PROGRESS NOTES
Hospitalist Progress Note    NAME: Bridger Allred   :  1960   MRN:  [id number]       Assessment / Plan:  COVID-19 pneumonia POA causing  Acute respiratory failure with hypoxia POA currently on 15 liters mid flow  Presents with 10 days increasing cough and BUSTOS              Diagnosed at [facility] 1 week ago  Came to emergency room with acutely worsening shortness of breath  Vaccinated x 2 in Sept-Oct 2021, no booster  CXR with multilobar patchy airspace disease compatible with Covid pneumonia. CTA chest reviewed, bilateral ASD, no PE  O2 to maintain sats > 90%  Currently on 15 L from 25 L high flow yesterday  IV dexamethasone  as per hospital protocol  s/p tocilizumab   IV remdesevir not indicated, > 7 days since onset  Procalcitonin less than 0.05, unlikely to have superimposed bacterial pneumonia  CRP 22  Pulmonary following, input is appreciated  Symbicort   IS and proning  Continue to improve on daily basis, continue to have a risk for deterioration  Currently on 2L/min. Cont' to wean as tolerated  Home O2 assessment in the AM and possible discharge home with O2. Updated pt's  via phone . Questions/concerns addressed     Prediabetes POA last hemoglobin A1c 6.1 in 2021  Not currently on treatment  At risk for hyperglycemia on steroids  Sliding scale insulin, check blood sugars  C/w scheduled insulin with steroids if hyperglycemic     Essential hypertension POA  LVH due to hypertension POA  History of TIA, POA  BP running low ~ systolic pressure  Hold losartan, HCTZ and spironolactone  Continue diltiazem CD with holding parameters.     Low-dose aspirin     Depression  Continue Seroquel, fluoxetine  Low-dose Ativan as needed  Constipation  Linzess as needed     Hypothyroidism POA  Continue levothyroxine        Obesity POA Body mass index is 36.44 kg/m².          DVT prophylaxis with lovenox     Code status: full code  NOK:       30.0 - 39.9 Obese / Body mass index is 36.44 kg/m². Estimated discharge date: January 10  Barriers: O2 requirements         Subjective:     Patient was seen and examined. No acute events overnight. Pt appears to be comfortable. She is eager to be discharge tomorrow  Discussed with RN overnight events. Review of Systems:  Symptom Y/N Comments  Symptom Y/N Comments   Fever/Chills n   Chest Pain n    Poor Appetite    Edema     Cough n   Abdominal Pain n    Sputum    Joint Pain     SOB/BUSTOS y  improving  Pruritis/Rash     Nausea/vomit n   Tolerating PT/OT     Diarrhea    Tolerating Diet y    Constipation    Other       Could NOT obtain due to:          Objective:     VITALS:   Last 24hrs VS reviewed since prior progress note. Most recent are:  Patient Vitals for the past 24 hrs:   Temp Pulse Resp BP SpO2   01/09/22 1111 98.6 °F (37 °C) 67 24 93/68 90 %   01/09/22 0832     92 %   01/09/22 0722 98.2 °F (36.8 °C) (!) 55 16 129/67 94 %   01/09/22 0452  (!) 46 11 110/63 95 %   01/08/22 2249  (!) 52 21 (!) 104/52 92 %   01/08/22 2000     (!) 89 %   01/08/22 1944     91 %   01/08/22 1908 98.8 °F (37.1 °C) (!) 50 20 (!) 100/52 90 %   01/08/22 1601 98.9 °F (37.2 °C) (!) 51 19 (!) 107/51 91 %   01/08/22 1545     92 %     No intake or output data in the 24 hours ending 01/09/22 1152     I had a face to face encounter and independently examined this patient on 1/9/2022, as outlined below:  PHYSICAL EXAM:  General: Resting, NAD  EENT:  EOMI. Anicteric sclerae. MMM  Resp:  No accessory muscle use  CV:  Regular  rhythm,  No edema  GI:  NT, ND. Neurologic:  AAOx4, moving all exts. Normal speech  Skin:  No rashes.   No jaundice    Reviewed most current lab test results and cultures  YES  Reviewed most current radiology test results   YES  Review and summation of old records today    NO  Reviewed patient's current orders and MAR    YES  PMH/SH reviewed - no change compared to H&P  ________________________________________________________________________  Care Plan discussed with:    Comments   Patient x    Family      RN x    Care Manager     Consultant                        Multidiciplinary team rounds were held today with , nursing, pharmacist and clinical coordinator. Patient's plan of care was discussed; medications were reviewed and discharge planning was addressed. ________________________________________________________________________  Total NON critical care TIME:  35Minutes    Total CRITICAL CARE TIME Spent:   Minutes non procedure based      Comments   >50% of visit spent in counseling and coordination of care     ________________________________________________________________________  Melissa Nuno MD     Procedures: see electronic medical records for all procedures/Xrays and details which were not copied into this note but were reviewed prior to creation of Plan. LABS:  I reviewed today's most current labs and imaging studies.   Pertinent labs include:  Recent Labs     01/08/22  0542   WBC 8.7   HGB 9.8*   HCT 31.7*        Recent Labs     01/08/22  0542      K 4.5      CO2 22   *   BUN 53*   CREA 1.84*   CA 9.0       Signed: Melissa Nuno MD

## 2022-01-10 PROBLEM — U07.1 PNEUMONIA DUE TO COVID-19 VIRUS: Status: RESOLVED | Noted: 2022-01-04 | Resolved: 2022-01-10

## 2022-01-10 PROBLEM — J12.82 PNEUMONIA DUE TO COVID-19 VIRUS: Status: RESOLVED | Noted: 2022-01-04 | Resolved: 2022-01-10

## 2022-01-10 LAB
ANION GAP SERPL CALC-SCNC: 7 MMOL/L (ref 5–15)
ATRIAL RATE: 54 BPM
BUN SERPL-MCNC: 65 MG/DL (ref 6–20)
BUN/CREAT SERPL: 36 (ref 12–20)
CALCIUM SERPL-MCNC: 9.2 MG/DL (ref 8.5–10.1)
CALCULATED P AXIS, ECG09: 65 DEGREES
CALCULATED R AXIS, ECG10: 16 DEGREES
CALCULATED T AXIS, ECG11: 25 DEGREES
CHLORIDE SERPL-SCNC: 108 MMOL/L (ref 97–108)
CO2 SERPL-SCNC: 23 MMOL/L (ref 21–32)
CREAT SERPL-MCNC: 1.79 MG/DL (ref 0.55–1.02)
DIAGNOSIS, 93000: NORMAL
GLUCOSE BLD STRIP.AUTO-MCNC: 125 MG/DL (ref 65–117)
GLUCOSE BLD STRIP.AUTO-MCNC: 132 MG/DL (ref 65–117)
GLUCOSE BLD STRIP.AUTO-MCNC: 183 MG/DL (ref 65–117)
GLUCOSE BLD STRIP.AUTO-MCNC: 193 MG/DL (ref 65–117)
GLUCOSE SERPL-MCNC: 136 MG/DL (ref 65–100)
P-R INTERVAL, ECG05: 162 MS
POTASSIUM SERPL-SCNC: 4.9 MMOL/L (ref 3.5–5.1)
Q-T INTERVAL, ECG07: 442 MS
QRS DURATION, ECG06: 98 MS
QTC CALCULATION (BEZET), ECG08: 419 MS
SERVICE CMNT-IMP: ABNORMAL
SODIUM SERPL-SCNC: 138 MMOL/L (ref 136–145)
TROPONIN-HIGH SENSITIVITY: 12 NG/L (ref 0–51)
VENTRICULAR RATE, ECG03: 54 BPM

## 2022-01-10 PROCEDURE — 74011636637 HC RX REV CODE- 636/637: Performed by: INTERNAL MEDICINE

## 2022-01-10 PROCEDURE — 74011250637 HC RX REV CODE- 250/637: Performed by: INTERNAL MEDICINE

## 2022-01-10 PROCEDURE — 93005 ELECTROCARDIOGRAM TRACING: CPT

## 2022-01-10 PROCEDURE — 36415 COLL VENOUS BLD VENIPUNCTURE: CPT

## 2022-01-10 PROCEDURE — 65660000000 HC RM CCU STEPDOWN

## 2022-01-10 PROCEDURE — 94640 AIRWAY INHALATION TREATMENT: CPT

## 2022-01-10 PROCEDURE — 80048 BASIC METABOLIC PNL TOTAL CA: CPT

## 2022-01-10 PROCEDURE — 84484 ASSAY OF TROPONIN QUANT: CPT

## 2022-01-10 PROCEDURE — 77010033678 HC OXYGEN DAILY

## 2022-01-10 PROCEDURE — 82962 GLUCOSE BLOOD TEST: CPT

## 2022-01-10 PROCEDURE — 74011250636 HC RX REV CODE- 250/636: Performed by: INTERNAL MEDICINE

## 2022-01-10 PROCEDURE — 74011000250 HC RX REV CODE- 250: Performed by: INTERNAL MEDICINE

## 2022-01-10 RX ORDER — DILTIAZEM HYDROCHLORIDE 180 MG/1
180 CAPSULE, EXTENDED RELEASE ORAL DAILY
Qty: 30 CAPSULE | Refills: 0 | Status: SHIPPED | OUTPATIENT
Start: 2022-01-10 | End: 2022-01-26

## 2022-01-10 RX ORDER — DEXAMETHASONE 6 MG/1
6 TABLET ORAL
Qty: 4 TABLET | Refills: 0 | Status: SHIPPED | OUTPATIENT
Start: 2022-01-10 | End: 2022-01-14

## 2022-01-10 RX ORDER — CALCIUM CARBONATE 200(500)MG
200 TABLET,CHEWABLE ORAL
Status: DISCONTINUED | OUTPATIENT
Start: 2022-01-10 | End: 2022-01-11 | Stop reason: HOSPADM

## 2022-01-10 RX ADMIN — DEXAMETHASONE SODIUM PHOSPHATE 10 MG: 10 INJECTION, SOLUTION INTRAMUSCULAR; INTRAVENOUS at 08:58

## 2022-01-10 RX ADMIN — SODIUM CHLORIDE, PRESERVATIVE FREE 10 ML: 5 INJECTION INTRAVENOUS at 22:01

## 2022-01-10 RX ADMIN — LEVOTHYROXINE SODIUM 100 MCG: 0.1 TABLET ORAL at 08:58

## 2022-01-10 RX ADMIN — Medication 1000 UNITS: at 08:58

## 2022-01-10 RX ADMIN — Medication 1 CAPSULE: at 08:58

## 2022-01-10 RX ADMIN — QUETIAPINE FUMARATE 100 MG: 25 TABLET ORAL at 21:59

## 2022-01-10 RX ADMIN — BUPROPION HYDROCHLORIDE 300 MG: 150 TABLET, EXTENDED RELEASE ORAL at 08:58

## 2022-01-10 RX ADMIN — ENOXAPARIN SODIUM 40 MG: 100 INJECTION SUBCUTANEOUS at 08:58

## 2022-01-10 RX ADMIN — SODIUM CHLORIDE, PRESERVATIVE FREE 10 ML: 5 INJECTION INTRAVENOUS at 05:16

## 2022-01-10 RX ADMIN — GUAIFENESIN 600 MG: 600 TABLET, EXTENDED RELEASE ORAL at 08:58

## 2022-01-10 RX ADMIN — SODIUM CHLORIDE, PRESERVATIVE FREE 10 ML: 5 INJECTION INTRAVENOUS at 17:19

## 2022-01-10 RX ADMIN — BUDESONIDE AND FORMOTEROL FUMARATE DIHYDRATE 2 PUFF: 80; 4.5 AEROSOL RESPIRATORY (INHALATION) at 21:24

## 2022-01-10 RX ADMIN — FLUOXETINE 40 MG: 20 CAPSULE ORAL at 08:58

## 2022-01-10 RX ADMIN — PANTOPRAZOLE SODIUM 40 MG: 40 TABLET, DELAYED RELEASE ORAL at 08:58

## 2022-01-10 RX ADMIN — CALCIUM CARBONATE (ANTACID) CHEW TAB 500 MG 200 MG: 500 CHEW TAB at 10:52

## 2022-01-10 RX ADMIN — GUAIFENESIN 600 MG: 600 TABLET, EXTENDED RELEASE ORAL at 17:19

## 2022-01-10 RX ADMIN — Medication 2 UNITS: at 17:19

## 2022-01-10 RX ADMIN — BUDESONIDE AND FORMOTEROL FUMARATE DIHYDRATE 2 PUFF: 80; 4.5 AEROSOL RESPIRATORY (INHALATION) at 08:28

## 2022-01-10 RX ADMIN — DILTIAZEM HYDROCHLORIDE 360 MG: 180 CAPSULE, COATED, EXTENDED RELEASE ORAL at 08:58

## 2022-01-10 RX ADMIN — OXYCODONE HYDROCHLORIDE AND ACETAMINOPHEN 1000 MG: 500 TABLET ORAL at 08:58

## 2022-01-10 NOTE — DISCHARGE SUMMARY
Hospitalist Discharge Summary     Patient ID:  Asuncion Abt  [id number]  59 y.o.  1960 1/4/2022    PCP on record: Claudio Gross MD    Admit date: 1/4/2022  Discharge date and time: 1/10/2022    DISCHARGE DIAGNOSIS:    COVID-19 pneumonia   Acute respiratory failure with hypoxia POA       Prediabetes POA      Essential hypertension POA  LVH due to hypertension POA  History of TIA, POA       Depression    Hypothyroidism POA        Obesity POA     Asymptomatic bradycardia           CONSULTATIONS:  IP CONSULT TO HOSPITALIST  IP CONSULT TO PULMONOLOGY    Excerpted HPI from H&P of Felicity Sever, MD:    65 YO female     Baseline not on home O2     Vaccinated vs COVID-19 x 2 in sept and October 2021, no booster     Several family members recently with COVID-19 infection     Pt developed cough and increasing BUSTOS around zeny time  Mild sore throat  Prominent cough, sore in left chest with coughing so much  No abdominal pain, N/V, diarrhea  Mild left chest pain  [facility] ~ 7 days ago  Not given treatment  Past few days, worsening BUSTOS  Now SOB even sitting up in bed, past 24 hours not really able to walk due to SOB  EMS was called  EMS sats In 80s, eventually placed on NRFM     ER  Currently on 15 liters mid flow  Much less SOB at rest, still winding moving in bed  RR to 28  ER sent COVID-test, currently pending  CXR Multilobar patchy airspace disease compatible with Covid pneumonia. CTA chest, I reviewed the scan personally              No pulmonary embolism or aortic aneurysm or dissection. Imaging findings consistent with moderate Covid pneumonia. Creat 1.41, baseline creatinine 1.2 to 1.3  IV decadron  We were called to admit the patient  ______________________________________________________________________  DISCHARGE SUMMARY/HOSPITAL COURSE:  for full details see H&P, daily progress notes, labs, consult notes.        COVID-19 pneumonia POA causing  Acute respiratory failure with hypoxia POA currently on 15 liters mid flow  Presents with 10 days increasing cough and BUSTOS              Diagnosed at [facility] 1 week ago  Came to emergency room with acutely worsening shortness of breath  Vaccinated x 2 in Sept-Oct 2021, no booster  CXR with multilobar patchy airspace disease compatible with Covid pneumonia. CTA chest reviewed, bilateral ASD, no PE  O2 to maintain sats > 90%  Currently on 4 L for more than 24 hours  Received IV dexamethasone in the hospital, complete dexamethasone to complete total of 10 days  s/p tocilizumab   IV remdesevir not indicated, > 7 days since onset  Procalcitonin less than 0.05, unlikely to have superimposed bacterial pneumonia  CRP 22  Pulmonary following, input is appreciated  Will need oxygen at discharge     Prediabetes POA last hemoglobin A1c 6.1 in April 2021  Advised lifestyle modification     Essential hypertension POA  LVH due to hypertension POA  History of TIA, POA  Blood pressure has been on the lower side  Hold losartan and hydrochlorothiazide  Decrease Cardizem to 90 mg daily  Reevaluated in outpatient treatment. Home meds  . Aspirin 80 mg daily     Depression  Continue home meds  Previous  Hypothyroidism POA  Continue levothyroxine         Obesity POA Body mass index is 36.44 kg/m². Counseling was provided about weight loss    Asymptomatic bradycardia  Patient has been asymptomatic   We will decrease Cardizem 50%   to be evaluated as an outpatient if symptoms develop    _______________________________________________________________________  Patient seen and examined by me on discharge day. Pertinent Findings:  Gen:    Not in distress  Chest: Clear lungs  CVS:   Regular rhythm.   No edema  Abd:  Soft, not distended, not tender  Neuro:  Alert,   _______________________________________________________________________  DISCHARGE MEDICATIONS:   Current Discharge Medication List      START taking these medications    Details   dexAMETHasone (DECADRON) 6 mg tablet Take 1 Tablet by mouth Daily (before breakfast) for 4 days. Qty: 4 Tablet, Refills: 0  Start date: 1/10/2022, End date: 1/14/2022         CONTINUE these medications which have NOT CHANGED    Details   pantoprazole (PROTONIX) 40 mg tablet Take 1 Tablet by mouth daily. Qty: 90 Tablet, Refills: 2    Associated Diagnoses: Gastroesophageal reflux disease without esophagitis      losartan-hydroCHLOROthiazide (HYZAAR) 100-25 mg per tablet Take 1 Tablet by mouth Every morning. Qty: 90 Tablet, Refills: 3    Associated Diagnoses: Primary hypertension      spironolactone (ALDACTONE) 25 mg tablet Take 1 Tablet by mouth Every morning. Qty: 90 Tablet, Refills: 0    Associated Diagnoses: Primary hypertension      levothyroxine (SYNTHROID) 100 mcg tablet TAKE 1 TABLET BY MOUTH ONCE DAILY BEFORE BREAKFAST  Qty: 30 Tablet, Refills: 0    Associated Diagnoses: Acquired hypothyroidism      dilTIAZem ER (TIAZAC) 360 mg capsule Take 1 Capsule by mouth daily. Qty: 90 Capsule, Refills: 3      linaCLOtide (Linzess) 72 mcg cap capsule Take 1 Cap by mouth Daily (before breakfast). For chronic constipation  Qty: 30 Cap, Refills: 1    Associated Diagnoses: Chronic constipation      FLUoxetine 60 mg tab 60 mg. buPROPion XL (WELLBUTRIN XL) 300 mg XL tablet Take 1 Tab by mouth every morning. Qty: 90 Tab, Refills: 3      QUEtiapine (SEROQUEL) 50 mg tablet Take 2 Tabs by mouth nightly. Qty: 60 Tab, Refills: 5    Associated Diagnoses: Chronic insomnia      simethicone (GAS-X PO) Take  by mouth.       LORazepam (ATIVAN) 0.5 mg tablet TAKE ONE BY MOUTH DAILY AS NEEDED FOR ANXIETY  Qty: 30 Tab, Refills: 0    Associated Diagnoses: Panic disorder         STOP taking these medications       losartan (COZAAR) 100 mg tablet Comments:   Reason for Stopping:         hydroCHLOROthiazide (HYDRODIURIL) 25 mg tablet Comments:   Reason for Stopping:                 Patient Follow Up Instructions:    Activity: Activity as tolerated  Diet: Resume previous diet  Wound Care: None needed        Follow-up Information     Follow up With Specialties Details Why Contact Cassandra Gee MD Internal Medicine   [address]  [phone]          ________________________________________________________________    Risk of deterioration: Low    Condition at Discharge:  Stable  __________________________________________________________________    Disposition  Home with family, no needs    ____________________________________________________________________    Code Status: Full Code  ___________________________________________________________________      Total time in minutes spent coordinating this discharge (includes going over instructions, follow-up, prescriptions, and preparing report for sign off to her PCP) :  >30 minutes    Signed:  Behzad Kelly MD

## 2022-01-10 NOTE — PROGRESS NOTES
Pulse oximetry assessment   96% at rest on room air (if 88% or less, skip next steps)  87% while ambulating on room air  96% at rest on 1.5 LPM  95% while ambulating on 1. 5LPM

## 2022-01-10 NOTE — PROGRESS NOTES
Transition of Care Plan:     RUR: 12% - low risk  Disposition: Home with [facility] (Shyam Barry)  Follow up appointments:  PCP, Specialists  DME needed: home oxygen  Transportation at Discharge: Spouse  Keys or means to access home:  yes      IM Medicare Letter: n/a  Is patient a BCPI-A Bundle:n/a               If yes, was Bundle Letter given?:    Is patient a  and connected with the [facility]?  n/a  If yes, was [place] transfer form completed and [facility] notified? Caregiver Contact:  Gregory Bowie [phone]  Discharge Caregiver contacted prior to discharge? yes    Updated Note 5:45 pm: CM attempted to call ENMA Jimenez left. CM contacted [facility] emergency line and left message. CM contacted CM manager, Located within [facility] agency, pt, , primary RN, and MD.     Updated Note 5:15 pm: CM received phone call from Daniel from [facility]. Faxed necessary documentation for oxygen. Updated Note 4:30 pm: CM spoke to Esthela Mendez. She is following up. Updated Note 3:22 pm: CM spoke to [facility]. CM updated pt, MD, and . Updated Note 3:10 pm: CM contact Barbara from [facility]. She is contacting the branch to assist CM. Will call back. Updated Note 2:55 pm: CM called Artur. CM had to leave . Updated Note 2:00 pm: CM received call  from [facility]. Do not accept her insurance. Still waiting to hear from [facility]. [employer] does not have any concentrators. Updated Note 12:25 pm: CM sent referral for home oxygen. Updated Note 12:00 pm: CM attempted to contact Dr. Suzette Sever via perfect serve and phone. CM needs O2 orders. CM contacted pt with update. Initial Note 09:40 am: No accepting Located within [facility] agencies. CM sent more referrals via [email or web address]. Unit CM will continue to follow. Care Management Interventions  PCP Verified by CM: Yes  Mode of Transport at Discharge:  Other (see comment) (spouse)  Transition of Care Consult (CM Consult): [address]:  (home oxygen)  Discharge Durable Medical Equipment: Yes  Physical Therapy Consult: Yes  Occupational Therapy Consult: Yes  Speech Therapy Consult: No  Support Systems: Spouse/Significant Other  Confirm Follow Up Transport: Family  The Plan for Transition of Care is Related to the Following Treatment Goals : Home with [facility]  The Patient and/or Patient Representative was Provided with a Choice of Provider and Agrees with the Discharge Plan?: Yes  Name of the Patient Representative Who was Provided with a Choice of Provider and Agrees with the Discharge Plan: patient  Freedom of Choice List was Provided with Basic Dialogue that Supports the Patient's Individualized Plan of Care/Goals, Treatment Preferences and Shares the Quality Data Associated with the Providers?: Yes  Discharge Location  Discharge Placement: Home with home health    Reji Pierce RN, BSN, [address] [facility]  [phone]

## 2022-01-10 NOTE — PROGRESS NOTES
[facility] follow-up PCP Telemedicine visit has been scheduled with Dr. Sofiya Pelaez on 1/12/2022 at 10:30am. This appointment is via phone call. PCP office is only participating in virtual or phone call appointments. Pending patient discharge. Adithya Neff Care Management Assistant     Attempted to schedule hospital follow up PCP appointment. Unable to reach anyone and no voicemail. Will try again later. Pending patient discharge.  Adithya Neff Care Management Assistant

## 2022-01-11 VITALS
HEART RATE: 53 BPM | RESPIRATION RATE: 15 BRPM | DIASTOLIC BLOOD PRESSURE: 65 MMHG | HEIGHT: 65 IN | BODY MASS INDEX: 36.49 KG/M2 | TEMPERATURE: 98.1 F | WEIGHT: 219 LBS | SYSTOLIC BLOOD PRESSURE: 140 MMHG | OXYGEN SATURATION: 98 %

## 2022-01-11 LAB
GLUCOSE BLD STRIP.AUTO-MCNC: 123 MG/DL (ref 65–117)
GLUCOSE BLD STRIP.AUTO-MCNC: 126 MG/DL (ref 65–117)
SERVICE CMNT-IMP: ABNORMAL
SERVICE CMNT-IMP: ABNORMAL

## 2022-01-11 PROCEDURE — 94640 AIRWAY INHALATION TREATMENT: CPT

## 2022-01-11 PROCEDURE — 82962 GLUCOSE BLOOD TEST: CPT

## 2022-01-11 PROCEDURE — 77010033678 HC OXYGEN DAILY

## 2022-01-11 PROCEDURE — 74011000250 HC RX REV CODE- 250: Performed by: INTERNAL MEDICINE

## 2022-01-11 PROCEDURE — 3E0DX3Z INTRODUCTION OF ANTI-INFLAMMATORY INTO MOUTH AND PHARYNX, EXTERNAL APPROACH: ICD-10-PCS | Performed by: INTERNAL MEDICINE

## 2022-01-11 PROCEDURE — 74011250637 HC RX REV CODE- 250/637: Performed by: INTERNAL MEDICINE

## 2022-01-11 PROCEDURE — 74011250636 HC RX REV CODE- 250/636: Performed by: INTERNAL MEDICINE

## 2022-01-11 RX ORDER — DEXAMETHASONE 4 MG/1
10 TABLET ORAL DAILY
Status: DISCONTINUED | OUTPATIENT
Start: 2022-01-12 | End: 2022-01-11 | Stop reason: HOSPADM

## 2022-01-11 RX ADMIN — BUDESONIDE AND FORMOTEROL FUMARATE DIHYDRATE 2 PUFF: 80; 4.5 AEROSOL RESPIRATORY (INHALATION) at 08:06

## 2022-01-11 RX ADMIN — OXYCODONE HYDROCHLORIDE AND ACETAMINOPHEN 1000 MG: 500 TABLET ORAL at 09:08

## 2022-01-11 RX ADMIN — FLUOXETINE 40 MG: 20 CAPSULE ORAL at 09:09

## 2022-01-11 RX ADMIN — LEVOTHYROXINE SODIUM 100 MCG: 0.1 TABLET ORAL at 09:10

## 2022-01-11 RX ADMIN — DEXAMETHASONE SODIUM PHOSPHATE 10 MG: 10 INJECTION, SOLUTION INTRAMUSCULAR; INTRAVENOUS at 09:09

## 2022-01-11 RX ADMIN — POLYETHYLENE GLYCOL 3350 17 G: 17 POWDER, FOR SOLUTION ORAL at 09:10

## 2022-01-11 RX ADMIN — PANTOPRAZOLE SODIUM 40 MG: 40 TABLET, DELAYED RELEASE ORAL at 09:09

## 2022-01-11 RX ADMIN — GUAIFENESIN 600 MG: 600 TABLET, EXTENDED RELEASE ORAL at 09:09

## 2022-01-11 RX ADMIN — BUPROPION HYDROCHLORIDE 300 MG: 150 TABLET, EXTENDED RELEASE ORAL at 09:09

## 2022-01-11 RX ADMIN — ENOXAPARIN SODIUM 40 MG: 100 INJECTION SUBCUTANEOUS at 09:09

## 2022-01-11 RX ADMIN — Medication 1000 UNITS: at 09:09

## 2022-01-11 RX ADMIN — SODIUM CHLORIDE, PRESERVATIVE FREE 10 ML: 5 INJECTION INTRAVENOUS at 05:40

## 2022-01-11 RX ADMIN — Medication 1 CAPSULE: at 09:09

## 2022-01-11 NOTE — PROGRESS NOTES
Verbal shift change report given to mary (oncoming nurse) by Te Mike (offgoing nurse). Report included the following information SBAR, Kardex, Intake/Output, MAR, Recent Results and Cardiac Rhythm sinus jeff.        0700: report given to lili bagley

## 2022-01-11 NOTE — PROGRESS NOTES
Hospitalist Progress Note    NAME: Gela Mckeon   :  1960   MRN:  [id number]       Assessment / Plan:    COVID-19 pneumonia POA causing  Acute respiratory failure with hypoxia POA currently on 15 liters mid flow  Presents with 10 days increasing cough and BUSTOS              Diagnosed at [facility] 1 week ago  Came to emergency room with acutely worsening shortness of breath  Vaccinated x 2 in Sept-Oct 2021, no booster  CXR with multilobar patchy airspace disease compatible with Covid pneumonia. CTA chest reviewed, bilateral ASD, no PE  O2 to maintain sats > 90%  Currently on 4 L for more than 24 hours  Received IV dexamethasone in the hospital, complete dexamethasone to complete total of 10 days  s/p tocilizumab   IV remdesevir not indicated, > 7 days since onset  Procalcitonin less than 0.05, unlikely to have superimposed bacterial pneumonia  CRP 22  Pulmonary following, input is appreciated  Will need oxygen at discharge     Prediabetes POA last hemoglobin A1c 6.1 in 2021  Advised lifestyle modification     Essential hypertension POA  LVH due to hypertension POA  History of TIA, POA  Blood pressure has been on the lower side  Hold losartan and hydrochlorothiazide  Decrease Cardizem to 90 mg daily  Reevaluated in outpatient treatment. Home meds  . Aspirin 80 mg daily     Depression  Continue home meds  Previous  Hypothyroidism POA  Continue levothyroxine         Obesity POA Body mass index is 36.44 kg/m². Counseling was provided about weight loss     Asymptomatic bradycardia  Patient has been asymptomatic   We will decrease Cardizem 50%   to be evaluated as an outpatient if symptoms develop       30.0 - 39.9 Obese / Body mass index is 36.44 kg/m². Estimated discharge date:   Barriers:    Code status: Full  Prophylaxis: Lovenox  Recommended Disposition: Home w/Family     Subjective:     Chief Complaint / Reason for Physician Visit  \"\". Discussed with RN events overnight. Waiting for home oxygen ready to be dc     Review of Systems:  Symptom Y/N Comments  Symptom Y/N Comments   Fever/Chills n   Chest Pain n    Poor Appetite    Edema     Cough    Abdominal Pain     Sputum    Joint Pain     SOB/BUSTOS y   Pruritis/Rash     Nausea/vomit    Tolerating PT/OT     Diarrhea    Tolerating Diet     Constipation n   Other       Could NOT obtain due to:      Objective:     VITALS:   Last 24hrs VS reviewed since prior progress note. Most recent are:  Patient Vitals for the past 24 hrs:   Temp Pulse Resp BP SpO2   01/11/22 1040 98.1 °F (36.7 °C) (!) 53 15 (!) 140/65 98 %   01/11/22 0801 98 °F (36.7 °C) (!) 49 16 129/73 96 %   01/11/22 0333 98.3 °F (36.8 °C) (!) 44 16 125/68 96 %   01/10/22 2244 98.2 °F (36.8 °C) (!) 47 13 116/61 93 %   01/10/22 2124     94 %   01/10/22 1955 98.3 °F (36.8 °C) (!) 56 17 124/69 96 %   01/10/22 1818 98.6 °F (37 °C) (!) 57 18 (!) 190/89 93 %     No intake or output data in the 24 hours ending 01/11/22 1312     I had a face to face encounter and independently examined this patient on 1/11/2022, as outlined below:  PHYSICAL EXAM:  General: WD, WN. Alert, cooperative, no acute distress    EENT:  EOMI. Anicteric sclerae. MMM  Resp:  CTA bilaterally, no wheezing or rales. No accessory muscle use  CV:  Regular  rhythm,  No edema  GI:  Soft, Non distended, Non tender. +Bowel sounds  Neurologic:  Alert and oriented X 3, normal speech,   Psych:   Good insight. Not anxious nor agitated  Skin:  No rashes.   No jaundice    Reviewed most current lab test results and cultures  YES  Reviewed most current radiology test results   YES  Review and summation of old records today    NO  Reviewed patient's current orders and MAR    YES  PMH/SH reviewed - no change compared to H&P  ________________________________________________________________________  Care Plan discussed with:    Comments   Patient y    Family      RN y    Care Manager     Consultant Multidiciplinary team rounds were held today with , nursing, pharmacist and clinical coordinator. Patient's plan of care was discussed; medications were reviewed and discharge planning was addressed. ________________________________________________________________________  Total NON critical care TIME:  30  Minutes    Total CRITICAL CARE TIME Spent:   Minutes non procedure based      Comments   >50% of visit spent in counseling and coordination of care y    ________________________________________________________________________  Shyam Bruce MD     Procedures: see electronic medical records for all procedures/Xrays and details which were not copied into this note but were reviewed prior to creation of Plan. LABS:  I reviewed today's most current labs and imaging studies. Pertinent labs include:  No results for input(s): WBC, HGB, HCT, PLT, HGBEXT, HCTEXT, PLTEXT in the last 72 hours. Recent Labs     01/10/22  0348      K 4.9      CO2 23   *   BUN 65*   CREA 1.79*   CA 9.2       Signed:  Shyam Bruce MD

## 2022-01-11 NOTE — PROGRESS NOTES
Transition of Care Plan:     RUR: 12% - low risk  Disposition: Home with HH (Daphney Brittle)  Follow up appointments:  PCP f/u appointment scheduled  DME needed: home oxygen - delivered to bedside by [place]ano Thomason at [facility] en route - ETA is 1 PM  [place] or means to access home:  yes      IM Medicare Letter: n/a  Is patient a BCPI-A Bundle:n/a               If yes, was Bundle Letter given?:    Is patient a [place] and connected with the [facility]?  n/a  If yes, was [employer] transfer form completed and VA notified? Caregiver Contact:  Gregory Bowie [phone]  Discharge Caregiver contacted prior to discharge? yes    Initial Note: CM phoned spouse Ej Gil [phone] to review discharge plan. PCP hospital f/u appointment scheduled - AVS updated. Pts  is on his way - ETA is 1pm. No barriers to discharge identified. Family is agreeable to the discharge plan and voiced no further questions/concerns regarding discharge at this time. Pt is ready for d/c from a CM standpoint. Assigned RN informed. Care Management Interventions  PCP Verified by CM: Yes  Palliative Care Criteria Met (RRAT>21 & CHF Dx)?: No  Mode of Transport at Discharge:  Other (see comment) (Spouse to transport )  Hospital Transport Time of Discharge: 1300  Transition of Care Consult (CM Consult): Discharge Planning,DME/Supply [address]:  (home oxygen)  Discharge Durable Medical Equipment: Yes  Physical Therapy Consult: Yes  Occupational Therapy Consult: Yes  Speech Therapy Consult: No  Support Systems: Spouse/Significant Other  Confirm Follow Up Transport: Family  The Plan for Transition of Care is Related to the Following Treatment Goals : Home with [facility]  The Patient and/or Patient Representative was Provided with a Choice of Provider and Agrees with the Discharge Plan?: Yes  Name of the Patient Representative Who was Provided with a Choice of Provider and Agrees with the Discharge Plan: patient  Freedom of Choice List was Provided with Basic Dialogue that Supports the Patient's Individualized Plan of Care/Goals, Treatment Preferences and Shares the Quality Data Associated with the Providers?: Yes   Resource Information Provided?: No  Discharge Location  Discharge Placement: Home with home health    Marysol Peña, Arlene Johnny Nunez, [facility]  [phone]

## 2022-01-11 NOTE — PROGRESS NOTES
0700- Bedside shift change report given to Cristine Rodriguez RN (oncoming nurse) by Ba Carvalho RN (offgoing nurse).  Report included the following information SBAR, Kardex, ED Summary, Intake/Output, MAR, Recent Results, Med Rec Status and Cardiac Rhythm SB.

## 2022-01-12 ENCOUNTER — VIRTUAL VISIT (OUTPATIENT)
Dept: INTERNAL MEDICINE CLINIC | Age: 62
End: 2022-01-12
Payer: COMMERCIAL

## 2022-01-12 ENCOUNTER — PATIENT OUTREACH (OUTPATIENT)
Dept: CASE MANAGEMENT | Age: 62
End: 2022-01-12

## 2022-01-12 DIAGNOSIS — I10 ESSENTIAL HYPERTENSION: ICD-10-CM

## 2022-01-12 DIAGNOSIS — J12.82 PNEUMONIA DUE TO COVID-19 VIRUS: ICD-10-CM

## 2022-01-12 DIAGNOSIS — J96.01 ACUTE RESPIRATORY FAILURE WITH HYPOXIA (HCC): ICD-10-CM

## 2022-01-12 DIAGNOSIS — Z09 HOSPITAL DISCHARGE FOLLOW-UP: Primary | ICD-10-CM

## 2022-01-12 DIAGNOSIS — U07.1 PNEUMONIA DUE TO COVID-19 VIRUS: ICD-10-CM

## 2022-01-12 PROCEDURE — 1111F DSCHRG MED/CURRENT MED MERGE: CPT | Performed by: INTERNAL MEDICINE

## 2022-01-12 PROCEDURE — 99495 TRANSJ CARE MGMT MOD F2F 14D: CPT | Performed by: INTERNAL MEDICINE

## 2022-01-12 RX ORDER — LOSARTAN POTASSIUM AND HYDROCHLOROTHIAZIDE 25; 100 MG/1; MG/1
1 TABLET ORAL EVERY MORNING
Qty: 90 TABLET | Refills: 3
Start: 2022-01-12

## 2022-01-12 RX ORDER — DILTIAZEM HYDROCHLORIDE 360 MG/1
360 CAPSULE, EXTENDED RELEASE ORAL DAILY
COMMUNITY

## 2022-01-12 NOTE — PROGRESS NOTES
Patient contacted regarding COVID-19 risk, exposure, diagnosis, pulse oximeter ordered at discharge, monoclonal antibody infusion follow up. Discussed COVID-19 related testing which was available at this time. Test results were positive. Patient informed of results, if available? yes. (Covid 19 testing done at [facility] about )  Care Transition Nurse contacted the patient by telephone to perform post discharge assessment. Call within 2 business days of discharge: Yes Verified name and  with patient as identifiers. Provided introduction to self, and explanation of the CTN/ACM role, and reason for call due to risk factors for infection and/or exposure to COVID-19. Symptoms reviewed with patient who verbalized the following symptoms: fatigue, shortness of breath and no new symptoms . Reports nose is stuffy from the oxygen. Using 2 liters/minute. No sob while on the oxygen. Feeling ok, better. No new symptoms. Due to no new or worsening symptoms encounter was not routed to provider for escalation. Discussed follow-up appointments. If no appointment was previously scheduled, appointment scheduling offered:  yes. [facility] follow up appointment(s): No future appointments. Had PARUL with pcp, , this am (virtual visit)  Non-[facility] follow up appointment(s): patient calling pulmonary, Dr.Drew Cortney Sabillon, to schedule f/u appt. Interventions to address risk factors: Scheduled appointment with PCP-,  at 10:30am, Scheduled appointment with Specialist-Patient calling to make appt with pulmonaryDr.Drew Cortney. and Obtained and reviewed discharge summary and/or continuity of care documents     Advance Care Planning:   Does patient have an Advance Directive: not on file. Educated patient about risk for severe COVID-19 due to risk factors according to CDC guidelines. CTN reviewed discharge instructions, medical action plan and red flag symptoms with the patient who verbalized understanding. Discussed COVID vaccination status: yes. Education provided on COVID-19 vaccination as appropriate. Discussed exposure protocols and quarantine with CDC Guidelines. Patient was given an opportunity to verbalize any questions and concerns and agrees to contact CTN or health care provider for questions related to their healthcare. Reviewed and educated patient on any new and changed medications related to discharge diagnosis     Was patient discharged with a pulse oximeter? no - but had one at home. Discussed and confirmed pulse oximeter discharge instructions and when to notify provider or seek emergency care. CTN provided contact information. Plan for follow-up call in 5-7 days based on severity of symptoms and risk factors.

## 2022-01-12 NOTE — PROGRESS NOTES
Cleo Bowie  Identified pt with two pt identifiers(name and ). Chief Complaint   Patient presents with   Kay Payment // came home on oxygen // pain - 0        Reviewed record In preparation for visit and have obtained necessary documentation. 1. Have you been to the ER, urgent care clinic or hospitalized since your last visit? Yes. [id number] [facility] on [address]    2. Have you seen or consulted any other health care providers outside of the [address] [place] [address] since your last visit? Include any pap smears or colon screening. No    Patient has an advance directive. Vitals reviewed with provider. Health Maintenance reviewed:     Health Maintenance Due   Topic    Shingrix Vaccine Age 49> (1 of 2)    Cervical cancer screen     Breast Cancer Screen Mammogram           Wt Readings from Last 3 Encounters:   22 219 lb (99.3 kg)   21 216 lb 12.8 oz (98.3 kg)   21 226 lb (102.5 kg)        Temp Readings from Last 3 Encounters:   22 98.1 °F (36.7 °C)   21 98.3 °F (36.8 °C) (Oral)   21 98 °F (36.7 °C) (Oral)        BP Readings from Last 3 Encounters:   22 (!) 140/65   21 (!) 160/99   21 (!) 158/84        Pulse Readings from Last 3 Encounters:   22 (!) 53   21 70   21 62      There were no vitals filed for this visit.        Learning Assessment:   :       Learning Assessment 2017   PRIMARY LEARNER Patient   PRIMARY LANGUAGE ENGLISH   LEARNER PREFERENCE PRIMARY DEMONSTRATION   ANSWERED BY patient   RELATIONSHIP SELF        Depression Screening:   :       3 most recent PHQ Screens 2022   PHQ Not Done -   Little interest or pleasure in doing things Not at all   Feeling down, depressed, irritable, or hopeless Not at all   Total Score PHQ 2 0   Trouble falling or staying asleep, or sleeping too much -   Feeling tired or having little energy -   Poor appetite, weight loss, or overeating -   Feeling bad about yourself - or that you are a failure or have let yourself or your family down -   Trouble concentrating on things such as school, work, reading, or watching TV -   Moving or speaking so slowly that other people could have noticed; or the opposite being so fidgety that others notice -   Thoughts of being better off dead, or hurting yourself in some way -   PHQ 9 Score -   How difficult have these problems made it for you to do your work, take care of your home and get along with others -        Fall Risk Assessment:   :     No flowsheet data found. Abuse Screening:   :       Abuse Screening Questionnaire 3/30/2021   Do you ever feel afraid of your partner? N   Are you in a relationship with someone who physically or mentally threatens you? N   Is it safe for you to go home?  Y        ADL Screening:   :       ADL Assessment 6/7/2019   Feeding yourself No Help Needed   Getting from bed to chair No Help Needed   Getting dressed No Help Needed   Bathing or showering No Help Needed   Walk across the room (includes cane/walker) No Help Needed   Using the telphone No Help Needed   Taking your medications No Help Needed   Preparing meals No Help Needed   Managing money (expenses/bills) No Help Needed   Moderately strenuous housework (laundry) No Help Needed   Shopping for personal items (toiletries/medicines) No Help Needed   Shopping for groceries No Help Needed   Driving No Help Needed   Climbing a flight of stairs No Help Needed   Getting to places beyond walking distances No Help Needed

## 2022-01-12 NOTE — PROGRESS NOTES
Transitional Care Management Progress Note    Patient: Gela Mckeon  : 1960  PCP: Santosh Guerra MD    Date of office visit: 2022   Date of admission: 22  Date of discharge: 22  Hospital: [facility]    Call initiated w/i 2 business dates of discharge: She is being seen within 2 business dates of discharge   Date of the most recent call to the patient: She is being seen within 2 business dates of discharge       Assessment/Plan:     Diagnoses and all orders for this visit:    1. Hospital discharge follow-up  -     RI DISCHARGE MEDS RECONCILED W/ CURRENT OUTPATIENT MED LIST    2. Pneumonia due to COVID-19 virus  Bilateral pneumonia. Continue Decadron. 3. Acute respiratory failure with hypoxia (HCC)  Controlled with 2 L home O2.    4. Essential hypertension  BP was starting to increase on day of discharge. Restart diltiazem. Continue losartan-HCTZ. Hold spironolactone. Follow-up and Dispositions    · Return in about 2 weeks (around 2022), or if symptoms worsen or fail to improve, for COVID pneumonia follow up (virtual). Routing History          Subjective:   Gela Mckeon is a 64 y.o. female presenting today for follow-up after hospital discharge. This encounter and supporting documentation was reviewed if available. Medication reconciliation was performed today. The main problem requiring admission was COVID-19 pneumonia. Complications during admission: none    Admitting symptoms have: improved    She has HTN, CKD stage 3, hypothyroidism, hyperlipidemia, major depression, anxiety disorder with panic attacks, GERD, history of gastric bypass in  with loss of 145 lbs, history of ischemic colitis in , and family history of colon polyps and kidney disease requiring hemodialysis. Hospitalized at [facility] from -1/10/22 with COVID-19 pneumonia and acute respiratory failure with hypoxia. She had received 2 vaccines against COVID but no booster.  Was diagnosed with COVID-19 at [facility] 1 week earlier and presented due to worsening dyspnea on exertion. Chest x-ray: Multilobar patchy airspace disease consistent with COVID-pneumonia. CTA chest: Bilateral airspace disease, no PE. Treated with IV dexamethasone and Tocilizumab (Actemra). Pulmonary followed. Discharged on Decadron 6 mg 1 tab daily for 4 days and 2 L supplemental oxygen. No complaints today. Breathing comfortable on supplemental oxygen. Denies fevers, chills, chest pain, cough, muscle aches, abdominal pain, or diarrhea. Reports her blood pressure was low while in the hospital and she was told to hold diltiazem. She has not taken it for the past 2 days and wants to know if she should restart it. Last BP on 1/11/22: 140/65. Has been taking losartan-HCTZ and spironolactone. Will start having home health soon. Has appointment with Pulmonary in 2 days. Needs FMLA form completed. Out from work starting 12/28/21. Her  will drop off form. Needs it completed by 1/14/22. Medications marked \"taking\" at this time:  Prior to Admission medications    Medication Sig Start Date End Date Taking? Authorizing Provider   losartan-hydroCHLOROthiazide (HYZAAR) 100-25 mg per tablet Take 1 Tablet by mouth Every morning. 1/12/22  Yes Ward Joshi MD   dilTIAZem ER [facility]) 360 mg capsule Take 360 mg by mouth daily. Yes Provider, Historical   dexAMETHasone (DECADRON) 6 mg tablet Take 1 Tablet by mouth Daily (before breakfast) for 4 days. 1/10/22 1/14/22 Yes Reddy Guzman MD   aspirin 81 mg cap Take 81 mg by mouth daily. 1/10/22  Yes Reddy Guzman MD   pantoprazole (PROTONIX) 40 mg tablet Take 1 Tablet by mouth daily. 12/2/21  Yes Ward Joshi MD   spironolactone (ALDACTONE) 25 mg tablet Take 1 Tablet by mouth Every morning.  12/2/21  Yes Heather Philip MD   levothyroxine (SYNTHROID) 100 mcg tablet TAKE 1 TABLET BY MOUTH ONCE DAILY BEFORE BREAKFAST 9/9/21  Yes Tonia Dunlap MD   linaCLOtide (Linzess) 72 mcg cap capsule Take 1 Cap by mouth Daily (before breakfast). For chronic constipation 4/28/21  Yes Mahnaz Joshi MD   FLUoxetine 60 mg tab 60 mg. 3/26/20  Yes Provider, Historical   buPROPion XL (WELLBUTRIN XL) 300 mg XL tablet Take 1 Tab by mouth every morning. 9/18/19  Yes Santosh Guerra MD   QUEtiapine (SEROQUEL) 50 mg tablet Take 2 Tabs by mouth nightly. 8/8/19  Yes Mahnaz Joshi MD   simethicone (GAS-X PO) Take  by mouth. Yes Provider, Historical   LORazepam (ATIVAN) 0.5 mg tablet TAKE ONE BY MOUTH DAILY AS NEEDED FOR ANXIETY 6/7/19  Yes Mahnaz Joshi MD   dilTIAZem ER (TIAZAC) 180 mg capsule Take 1 Capsule by mouth daily. Patient not taking: Reported on 1/12/2022 1/10/22   Baron Sury MD       Patient Active Problem List   Diagnosis Code    Hypertension I10    LVH (left ventricular hypertrophy) due to hypertensive disease I11.9    Venous stasis I87.8    Severe episode of recurrent major depressive disorder, without psychotic features ([facility] [address] F33.2    Panic disorder F41.0    Acquired hypothyroidism E03.9    Vitamin D deficiency E55.9    Chronic insomnia F51.04    Gastroesophageal reflux disease without esophagitis K21.9    History of gastric bypass Z98.84    Tobacco use Z72.0    Obesity (BMI 30-39. 9) E66.9    IGT (impaired glucose tolerance) R73.02    Stage 3 chronic kidney disease (HCC) N18.30    Prediabetes R73.03    Mixed hyperlipidemia E78.2    Chronic pain of both knees M25.561, M25.562, G89.29    Neck pain M54.2    OCD (obsessive compulsive disorder) F42.9    PTSD (post-traumatic stress disorder) F43.10    Severe obesity (HCC) E66.01          Objective: There were no vitals taken for this visit. General: Obese, no distress. Using 2 L O2 by nasal cannula. HEENT:  Head normocephalic/atraumatic, no scleral icterus  Lungs/Heart: Respiratory effort normal.  Neurological: Alert and oriented. Psychiatric: Normal mood and affect.  Behavior is normal.       We discussed the expected course, resolution and complications of the diagnosis(es) in detail. Medication risks, benefits, costs, interactions, and alternatives were discussed as indicated. I advised her to contact the office if her condition worsens, changes or fails to improve as anticipated. She expressed understanding with the diagnosis(es) and plan.      Paige Padilla MD

## 2022-01-13 PROBLEM — J96.01 ACUTE RESPIRATORY FAILURE WITH HYPOXIA (HCC): Status: ACTIVE | Noted: 2022-01-13

## 2022-01-20 ENCOUNTER — PATIENT OUTREACH (OUTPATIENT)
Dept: CASE MANAGEMENT | Age: 62
End: 2022-01-20

## 2022-01-26 ENCOUNTER — VIRTUAL VISIT (OUTPATIENT)
Dept: INTERNAL MEDICINE CLINIC | Age: 62
End: 2022-01-26
Payer: COMMERCIAL

## 2022-01-26 ENCOUNTER — PATIENT OUTREACH (OUTPATIENT)
Dept: CASE MANAGEMENT | Age: 62
End: 2022-01-26

## 2022-01-26 DIAGNOSIS — J96.01 ACUTE RESPIRATORY FAILURE WITH HYPOXIA (HCC): ICD-10-CM

## 2022-01-26 DIAGNOSIS — U07.1 PNEUMONIA DUE TO COVID-19 VIRUS: ICD-10-CM

## 2022-01-26 DIAGNOSIS — I10 ESSENTIAL HYPERTENSION: Primary | ICD-10-CM

## 2022-01-26 DIAGNOSIS — J12.82 PNEUMONIA DUE TO COVID-19 VIRUS: ICD-10-CM

## 2022-01-26 PROCEDURE — 99213 OFFICE O/P EST LOW 20 MIN: CPT | Performed by: INTERNAL MEDICINE

## 2022-01-26 RX ORDER — NITROGLYCERIN 0.4 MG/1
TABLET SUBLINGUAL
COMMUNITY
Start: 2022-01-13

## 2022-01-26 NOTE — PROGRESS NOTES
Cleo Bowie  Identified pt with two pt identifiers(name and ). Chief Complaint   Patient presents with    Post-COVID Symptoms     pain - 0        Reviewed record In preparation for visit and have obtained necessary documentation. 1. Have you been to the ER, urgent care clinic or hospitalized since your last visit? No     2. Have you seen or consulted any other health care providers outside of the [address] [place] [address] since your last visit? Include any pap smears or colon screening. No    Patient does not have an advance directive. Vitals reviewed with provider. Health Maintenance reviewed:     Health Maintenance Due   Topic    Cervical cancer screen     Breast Cancer Screen Mammogram           Wt Readings from Last 3 Encounters:   22 219 lb (99.3 kg)   21 216 lb 12.8 oz (98.3 kg)   21 226 lb (102.5 kg)        Temp Readings from Last 3 Encounters:   22 98.1 °F (36.7 °C)   21 98.3 °F (36.8 °C) (Oral)   21 98 °F (36.7 °C) (Oral)        BP Readings from Last 3 Encounters:   22 (!) 140/65   21 (!) 160/99   21 (!) 158/84        Pulse Readings from Last 3 Encounters:   22 (!) 53   21 70   21 62      There were no vitals filed for this visit.        Learning Assessment:   :       Learning Assessment 2017   PRIMARY LEARNER Patient   PRIMARY LANGUAGE ENGLISH   LEARNER PREFERENCE PRIMARY DEMONSTRATION   ANSWERED BY patient   RELATIONSHIP SELF        Depression Screening:   :       3 most recent PHQ Screens 2022   PHQ Not Done -   Little interest or pleasure in doing things Several days   Feeling down, depressed, irritable, or hopeless Several days   Total Score PHQ 2 2   Trouble falling or staying asleep, or sleeping too much -   Feeling tired or having little energy -   Poor appetite, weight loss, or overeating -   Feeling bad about yourself - or that you are a failure or have let yourself or your family down - Trouble concentrating on things such as school, work, reading, or watching TV -   Moving or speaking so slowly that other people could have noticed; or the opposite being so fidgety that others notice -   Thoughts of being better off dead, or hurting yourself in some way -   PHQ 9 Score -   How difficult have these problems made it for you to do your work, take care of your home and get along with others -        Fall Risk Assessment:   :     No flowsheet data found. Abuse Screening:   :       Abuse Screening Questionnaire 3/30/2021   Do you ever feel afraid of your partner? N   Are you in a relationship with someone who physically or mentally threatens you? N   Is it safe for you to go home?  Y        ADL Screening:   :       ADL Assessment 6/7/2019   Feeding yourself No Help Needed   Getting from bed to chair No Help Needed   Getting dressed No Help Needed   Bathing or showering No Help Needed   Walk across the room (includes cane/walker) No Help Needed   Using the telphone No Help Needed   Taking your medications No Help Needed   Preparing meals No Help Needed   Managing money (expenses/bills) No Help Needed   Moderately strenuous housework (laundry) No Help Needed   Shopping for personal items (toiletries/medicines) No Help Needed   Shopping for groceries No Help Needed   Driving No Help Needed   Climbing a flight of stairs No Help Needed   Getting to places beyond walking distances No Help Needed

## 2022-01-26 NOTE — PROGRESS NOTES
Follow Up Call    Challenges to be reviewed by the provider   Additional needs identified to be addressed with provider: yes  Still some sob, pulmonary suggests O2 x 6 weeks. Encounter was routed to provider for escalation. Method of communication with provider: chart routing. Contacted the patient by telephone to follow up after hospital visit. Reports she feels better, still some sob. Saw pulmonary on 1/21 for VV. Advised wearing O2 x 6 weeks. Pulse ox 93-96% on O2 at rest. Drops to 83% if walks without O2. Status: improved  Interventions to address identified needs: Obtained and reviewed discharge summary and/or continuity of care documents. Encouraged to use Incentive Spirometer q hour while awake to strengthen lungs and gradually increase walking. [facility] follow up appointment(s):   Future Appointments   Date Time Provider Raul Jacobo   1/26/2022  1:00 PM Morgan Barnhart MD [facility]     [facility] follow up appointment(s): Dr.Drew Dawkins,pulmonary, had appt last week. Follow up appointment completed? yes. Provided contact information for future needs. Plan for follow-up call in 7-10 days based on severity of symptoms and risk factors.   Plan for next call: symptom management-assess current symptoms     Mary Vázquez RN

## 2022-01-26 NOTE — PROGRESS NOTES
Ade Petersen is a 64 y.o. female who was seen by synchronous (real-time) audio-video technology on 1/26/2022 for Post-COVID Symptoms (pain - 0 )        Assessment & Plan:     Diagnoses and all orders for this visit:    1. Essential hypertension  Elevated at 153/90. Continue diltiazem. -     Restart losartan-HCTZ. Hold spironolactone. 2. Pneumonia due to COVID-19 virus  Resolving. 3. Acute respiratory failure with hypoxia (HCC)   Continue home O2 2-3 L. Follow-up and Dispositions    · Return in about 2 months (around 3/26/2022), or if symptoms worsen or fail to improve, for HTN, acute resp failure. 712  Subjective:     Presents for 2 week follow up on COVID-19 pneumonia and acute respiratory failure with hypoxia. Reports she is feeling better overall. Still gets tired easily. Using 2.5 L O2 continuously at home but recently increased it to 3 L due to chest pain; chest pain resolved on 3 L. Denies fevers, chills, cough, wheezing, or body aches. Saw Zaina Young NP last week. Was told she should stay on home O2 for 6 weeks. Has follow up appointment next week. BP has been high at home. /90 yesterday. Taking only diltiazem 360 mg daily for BP; has been holding losartan-HCTZ and spironolactone. Prior to Admission medications    Medication Sig Start Date End Date Taking? Authorizing Provider   nitroglycerin (NITROSTAT) 0.4 mg SL tablet TAKE ONE TABLET UNDER THE TONGUE EVERY 5 MINUTES, 3 TIMES FOR CHEST PAIN AS NEEDED 1/13/22  Yes Provider, Historical   Oxygen    Yes Provider, Historical   dilTIAZem ER (TIAZAC) 360 mg capsule Take 360 mg by mouth daily. Yes Provider, Historical   aspirin 81 mg cap Take 81 mg by mouth daily. 1/10/22  Yes Felicita De La Torre MD   pantoprazole (PROTONIX) 40 mg tablet Take 1 Tablet by mouth daily.  12/2/21  Yes Queenie Joshi MD   levothyroxine (SYNTHROID) 100 mcg tablet TAKE 1 TABLET BY MOUTH ONCE DAILY BEFORE BREAKFAST 9/9/21  Yes Chris Diaz, MD   linaCLOtide (Linzess) 72 mcg cap capsule Take 1 Cap by mouth Daily (before breakfast). For chronic constipation 4/28/21  Yes Queenie Joshi MD   FLUoxetine 60 mg tab 60 mg. 3/26/20  Yes Provider, Historical   buPROPion XL (WELLBUTRIN XL) 300 mg XL tablet Take 1 Tab by mouth every morning. 9/18/19  Yes Clayton Mandujano MD   QUEtiapine (SEROQUEL) 50 mg tablet Take 2 Tabs by mouth nightly. 8/8/19  Yes Queenie Joshi MD   simethicone (GAS-X PO) Take  by mouth. Yes Provider, Historical   LORazepam (ATIVAN) 0.5 mg tablet TAKE ONE BY MOUTH DAILY AS NEEDED FOR ANXIETY 6/7/19  Yes Queenie Joshi MD   losartan-hydroCHLOROthiazide (HYZAAR) 100-25 mg per tablet Take 1 Tablet by mouth Every morning. Patient not taking: Reported on 1/26/2022 1/12/22   Clayton Mandujano MD   spironolactone (ALDACTONE) 25 mg tablet Take 1 Tablet by mouth Every morning. Patient not taking: Reported on 1/26/2022 12/2/21   Clayton Mandujano MD     Patient Active Problem List   Diagnosis Code    Essential hypertension I10    LVH (left ventricular hypertrophy) due to hypertensive disease I11.9    Venous stasis I87.8    Severe episode of recurrent major depressive disorder, without psychotic features ([facility] [address] F33.2    Panic disorder F41.0    Acquired hypothyroidism E03.9    Vitamin D deficiency E55.9    Chronic insomnia F51.04    Gastroesophageal reflux disease without esophagitis K21.9    History of gastric bypass Z98.84    Tobacco use Z72.0    Obesity (BMI 30-39. 9) E66.9    IGT (impaired glucose tolerance) R73.02    Stage 3 chronic kidney disease (HCC) N18.30    Prediabetes R73.03    Mixed hyperlipidemia E78.2    Chronic pain of both knees M25.561, M25.562, G89.29    Neck pain M54.2    OCD (obsessive compulsive disorder) F42.9    PTSD (post-traumatic stress disorder) F43.10    Severe obesity (HCC) E66.01    Pneumonia due to COVID-19 virus U07.1, J12.82    Acute respiratory failure with hypoxia ([facility]) J96.01       Objective: Patient-Reported Vitals 1/26/2022   Patient-Reported Weight 210lb      General: alert, cooperative, no distress   Mental  status: normal mood, behavior, speech, dress, motor activity, and thought processes, able to follow commands   HENT: NCAT   Neck: no visualized mass   Resp: no respiratory distress   Neuro: no gross deficits   Skin: no discoloration or lesions of concern on visible areas   Psychiatric: normal affect, consistent with stated mood, no evidence of hallucinations     Additional exam findings: using home O2 by nasal cannula, able to speak in complete sentences, no accessory respiratory muscle use      We discussed the expected course, resolution and complications of the diagnosis(es) in detail. Medication risks, benefits, costs, interactions, and alternatives were discussed as indicated. I advised her to contact the office if her condition worsens, changes or fails to improve as anticipated. She expressed understanding with the diagnosis(es) and plan. THIS VISIT WAS COMPLETED VIRTUALLY USING [employer] TELEMEDICINE    [facility] , was evaluated through a synchronous (real-time) audio-video encounter. The patient (or guardian if applicable) is aware that this is a billable service, which includes applicable co-pays. Verbal consent to proceed has been obtained. The visit was conducted pursuant to the emergency declaration under the [facility] [address] authority and the [employer] General Act. Patient identification was verified, and a caregiver was present when appropriate. The patient was located at home in a state where the provider was licensed to provide care.     Margaret Lagunas MD

## 2022-02-04 ENCOUNTER — PATIENT OUTREACH (OUTPATIENT)
Dept: CASE MANAGEMENT | Age: 62
End: 2022-02-04

## 2022-02-04 NOTE — PROGRESS NOTES
Follow Up Call    Challenges to be reviewed by the provider   Additional needs identified to be addressed with provider: yes  f/u from [facility] 1/4=1/11. Still needing O2- 3 liters. Had VV with pulmonary this am, NP Adrianna Archibald. Encounter was routed to provider for escalation. Method of communication with provider: chart routing. Contacted the patient by telephone to follow up after hospital visit. Reports she feels better, still needing the O2, using 3 liters/minute. Had VV with pulmonary this am.    Status: improved  Interventions to address identified needs: Education of patient/family/caregiver/guardian to support self-management-continue to monitor oxygen levels, using O2 at 3 liters/minute. Continue with f/u with pulmonary and pcp as advised. [facility] follow up appointment(s):   Future Appointments   Date Time Provider Raul Jacobo   3/25/2022  2:00 PM Deborah Gee MD [facility]     Non-[facility] follow up appointment(s): Pulmonary, NP Adrianna Archibald- VV this am and will f/u in 3 weeks. Follow up appointment completed? yes. Provided contact information for future needs. Plan for follow-up call in 7-10 days based on severity of symptoms and risk factors.   Plan for next call: symptom management-assess current symptoms     Cruz Jackson RN

## 2022-02-11 ENCOUNTER — PATIENT OUTREACH (OUTPATIENT)
Dept: CASE MANAGEMENT | Age: 62
End: 2022-02-11

## 2022-02-11 ENCOUNTER — TRANSCRIBE ORDER (OUTPATIENT)
Dept: REGISTRATION | Age: 62
End: 2022-02-11

## 2022-02-11 ENCOUNTER — HOSPITAL ENCOUNTER (OUTPATIENT)
Dept: GENERAL RADIOLOGY | Age: 62
Discharge: HOME | End: 2022-02-11
Payer: COMMERCIAL

## 2022-02-11 DIAGNOSIS — U07.1 CLINICAL DIAGNOSIS OF SEVERE ACUTE RESPIRATORY SYNDROME CORONAVIRUS 2 (SARS-COV-2) DISEASE: Primary | ICD-10-CM

## 2022-02-11 DIAGNOSIS — U07.1 CLINICAL DIAGNOSIS OF SEVERE ACUTE RESPIRATORY SYNDROME CORONAVIRUS 2 (SARS-COV-2) DISEASE: ICD-10-CM

## 2022-02-11 PROCEDURE — 71046 X-RAY EXAM CHEST 2 VIEWS: CPT

## 2022-02-11 NOTE — PROGRESS NOTES
Patient has graduated from the Transitions of Care Coordination  program on 2/11/22. .  Reports continues to need to use the O2- 3 liters/minute. When at rest, pulse ox is 95-97%. But if moving around, drops down-lowest it has gone down is 88%. Getting ready to go to have cxr done this afternoon. Has appt with pulmonary on Monday. Offered to refer to [facility] team, she declined. Patient/family has the ability to self-manage at this time Care management goals have been completed. Patient was not referred to the [facility] team for further management. Goals Addressed    None         Patient has Care Transition Nurse's contact information for any further questions, concerns, or needs.   Patients upcoming visits:    Future Appointments   Date Time Provider Raul Jacobo   3/25/2022  2:00 PM MD [facility]

## 2022-03-18 PROBLEM — Z72.0 TOBACCO USE: Status: ACTIVE | Noted: 2019-06-07

## 2022-03-18 PROBLEM — K21.9 GASTROESOPHAGEAL REFLUX DISEASE WITHOUT ESOPHAGITIS: Status: ACTIVE | Noted: 2019-06-07

## 2022-03-18 PROBLEM — E55.9 VITAMIN D DEFICIENCY: Status: ACTIVE | Noted: 2019-06-07

## 2022-03-18 PROBLEM — F41.0 PANIC DISORDER: Status: ACTIVE | Noted: 2019-06-07

## 2022-03-18 PROBLEM — N18.30 STAGE 3 CHRONIC KIDNEY DISEASE (HCC): Status: ACTIVE | Noted: 2019-06-09

## 2022-03-18 PROBLEM — J96.01 ACUTE RESPIRATORY FAILURE WITH HYPOXIA (HCC): Status: ACTIVE | Noted: 2022-01-13

## 2022-03-19 PROBLEM — R73.03 PREDIABETES: Status: ACTIVE | Noted: 2019-06-09

## 2022-03-19 PROBLEM — Z98.84 HISTORY OF GASTRIC BYPASS: Status: ACTIVE | Noted: 2019-06-07

## 2022-03-19 PROBLEM — M25.562 CHRONIC PAIN OF BOTH KNEES: Status: ACTIVE | Noted: 2019-10-03

## 2022-03-19 PROBLEM — M25.561 CHRONIC PAIN OF BOTH KNEES: Status: ACTIVE | Noted: 2019-10-03

## 2022-03-19 PROBLEM — F43.10 PTSD (POST-TRAUMATIC STRESS DISORDER): Status: ACTIVE | Noted: 2019-10-30

## 2022-03-19 PROBLEM — E78.2 MIXED HYPERLIPIDEMIA: Status: ACTIVE | Noted: 2019-06-09

## 2022-03-19 PROBLEM — G89.29 CHRONIC PAIN OF BOTH KNEES: Status: ACTIVE | Noted: 2019-10-03

## 2022-03-19 PROBLEM — F33.2 SEVERE EPISODE OF RECURRENT MAJOR DEPRESSIVE DISORDER, WITHOUT PSYCHOTIC FEATURES (HCC): Status: ACTIVE | Noted: 2019-06-07

## 2022-03-19 PROBLEM — E66.01 SEVERE OBESITY (HCC): Status: ACTIVE | Noted: 2020-01-27

## 2022-03-19 PROBLEM — R73.02 IGT (IMPAIRED GLUCOSE TOLERANCE): Status: ACTIVE | Noted: 2019-06-07

## 2022-03-19 PROBLEM — E03.9 ACQUIRED HYPOTHYROIDISM: Status: ACTIVE | Noted: 2019-06-07

## 2022-03-19 PROBLEM — F42.9 OCD (OBSESSIVE COMPULSIVE DISORDER): Status: ACTIVE | Noted: 2019-10-30

## 2022-03-19 PROBLEM — F51.04 CHRONIC INSOMNIA: Status: ACTIVE | Noted: 2019-06-07

## 2022-03-20 PROBLEM — M54.2 NECK PAIN: Status: ACTIVE | Noted: 2019-10-03

## 2022-03-20 PROBLEM — U07.1 PNEUMONIA DUE TO COVID-19 VIRUS: Status: ACTIVE | Noted: 2022-01-04

## 2022-03-20 PROBLEM — J12.82 PNEUMONIA DUE TO COVID-19 VIRUS: Status: ACTIVE | Noted: 2022-01-04

## 2022-03-20 PROBLEM — E66.9 OBESITY (BMI 30-39.9): Status: ACTIVE | Noted: 2019-06-07

## 2022-03-25 ENCOUNTER — OFFICE VISIT (OUTPATIENT)
Dept: INTERNAL MEDICINE CLINIC | Age: 62
End: 2022-03-25
Payer: COMMERCIAL

## 2022-03-25 ENCOUNTER — HOSPITAL ENCOUNTER (OUTPATIENT)
Dept: GENERAL RADIOLOGY | Age: 62
Discharge: HOME | End: 2022-03-25
Payer: COMMERCIAL

## 2022-03-25 ENCOUNTER — TRANSCRIBE ORDER (OUTPATIENT)
Dept: REGISTRATION | Age: 62
End: 2022-03-25

## 2022-03-25 VITALS
OXYGEN SATURATION: 96 % | HEIGHT: 65 IN | DIASTOLIC BLOOD PRESSURE: 77 MMHG | HEART RATE: 73 BPM | TEMPERATURE: 98.4 F | WEIGHT: 213 LBS | SYSTOLIC BLOOD PRESSURE: 134 MMHG | RESPIRATION RATE: 18 BRPM | BODY MASS INDEX: 35.49 KG/M2

## 2022-03-25 DIAGNOSIS — I10 ESSENTIAL HYPERTENSION: Primary | ICD-10-CM

## 2022-03-25 DIAGNOSIS — U07.1 CLINICAL DIAGNOSIS OF SEVERE ACUTE RESPIRATORY SYNDROME CORONAVIRUS 2 (SARS-COV-2) DISEASE: ICD-10-CM

## 2022-03-25 DIAGNOSIS — U07.1 CLINICAL DIAGNOSIS OF SEVERE ACUTE RESPIRATORY SYNDROME CORONAVIRUS 2 (SARS-COV-2) DISEASE: Primary | ICD-10-CM

## 2022-03-25 DIAGNOSIS — N18.31 STAGE 3A CHRONIC KIDNEY DISEASE (HCC): ICD-10-CM

## 2022-03-25 DIAGNOSIS — J96.01 ACUTE RESPIRATORY FAILURE WITH HYPOXIA (HCC): ICD-10-CM

## 2022-03-25 DIAGNOSIS — E78.2 MIXED HYPERLIPIDEMIA: ICD-10-CM

## 2022-03-25 DIAGNOSIS — E03.9 ACQUIRED HYPOTHYROIDISM: ICD-10-CM

## 2022-03-25 DIAGNOSIS — E55.9 VITAMIN D DEFICIENCY: ICD-10-CM

## 2022-03-25 DIAGNOSIS — R73.02 IGT (IMPAIRED GLUCOSE TOLERANCE): ICD-10-CM

## 2022-03-25 PROCEDURE — 99214 OFFICE O/P EST MOD 30 MIN: CPT | Performed by: INTERNAL MEDICINE

## 2022-03-25 PROCEDURE — 71046 X-RAY EXAM CHEST 2 VIEWS: CPT

## 2022-03-25 NOTE — PROGRESS NOTES
CC:   Chief Complaint   Patient presents with    Hypertension    Breathing Problem     shortness of breath       HISTORY OF PRESENT ILLNESS  Soledad Garcia is a 64 y.o. female. Presents for 2 month follow up on HTN and acute respiratory failure after having COVID-19 pneumonia requiring oxygen and hypoxia in January. No longer uses 2 L O2 continuously, now only uses at nighttime. Has appointment with Bryan Beth NP (Pulmonary) next week. Concerned that she may have long-haul Covid. Complains of burning fog, throat drainage, chest tightness, much fatigue, and nausea. Denies fevers, chills, cough, wheezing, or body aches. Scheduled to go back to work on 4/4/22. Denies HA's, SOB, dizziness, or leg swelling. Takes diltiazem and losartan-HCTZ. Stopped spironolactone due to low BP's. Health Maintenance  COVID booster vaccine: had  Mammogram: ordered, has not had  Pap smear: wants to postpone (last had with Ivan Lafleur NP, [facility])       Patient Active Problem List   Diagnosis Code    Essential hypertension I10    LVH (left ventricular hypertrophy) due to hypertensive disease I11.9    Venous stasis I87.8    Severe episode of recurrent major depressive disorder, without psychotic features ([facility] [address] F33.2    Panic disorder F41.0    Acquired hypothyroidism E03.9    Vitamin D deficiency E55.9    Chronic insomnia F51.04    Gastroesophageal reflux disease without esophagitis K21.9    History of gastric bypass Z98.84    Tobacco use Z72.0    Obesity (BMI 30-39. 9) E66.9    IGT (impaired glucose tolerance) R73.02    Stage 3 chronic kidney disease (HCC) N18.30    Prediabetes R73.03    Mixed hyperlipidemia E78.2    Chronic pain of both knees M25.561, M25.562, G89.29    Neck pain M54.2    OCD (obsessive compulsive disorder) F42.9    PTSD (post-traumatic stress disorder) F43.10    Severe obesity (HCC) E66.01    Pneumonia due to COVID-19 virus U07.1, J12.82    Acute respiratory failure with hypoxia ([facility] J96.01     Past Medical History:   Diagnosis Date    Anxiety disorder     Arthritis     knee arthritis    COVID-19 12/26/2021    Depression     Fatigue     GERD (gastroesophageal reflux disease)     Headache     Heart disease     Hiatal hernia     Hypertension     Ischemic colon ([facility] 2009    Kidney disease     Leg swelling     LVH (left ventricular hypertrophy) due to hypertensive disease     dr Elva Voss   [facility] Morbid obesity ([facility]     Postmenopausal     Short of breath on exertion     Thyroid disease     TIA (transient ischemic attack)     Venous stasis     Vertigo      Allergies   Allergen Reactions    Lisinopril Cough    Lortab [Hydrocodone-Acetaminophen] Itching    Morphine Nausea and Vomiting       Current Outpatient Medications   Medication Sig Dispense Refill    Euthyrox 100 mcg tablet TAKE 1 TABLET BY MOUTH ONCE DAILY BEFORE BREAKFAST 30 Tablet 1    Oxygen       pantoprazole (PROTONIX) 40 mg tablet Take 1 Tablet by mouth daily. 90 Tablet 2    linaCLOtide (Linzess) 72 mcg cap capsule Take 1 Cap by mouth Daily (before breakfast). For chronic constipation 30 Cap 1    FLUoxetine 60 mg tab 60 mg.      buPROPion XL (WELLBUTRIN XL) 300 mg XL tablet Take 1 Tab by mouth every morning. 90 Tab 3    QUEtiapine (SEROQUEL) 50 mg tablet Take 2 Tabs by mouth nightly. 60 Tab 5    simethicone (GAS-X PO) Take  by mouth.  LORazepam (ATIVAN) 0.5 mg tablet TAKE ONE BY MOUTH DAILY AS NEEDED FOR ANXIETY 30 Tab 0    nitroglycerin (NITROSTAT) 0.4 mg SL tablet TAKE ONE TABLET UNDER THE TONGUE EVERY 5 MINUTES, 3 TIMES FOR CHEST PAIN AS NEEDED (Patient not taking: Reported on 3/25/2022)      losartan-hydroCHLOROthiazide (HYZAAR) 100-25 mg per tablet Take 1 Tablet by mouth Every morning. (Patient not taking: Reported on 1/26/2022) 90 Tablet 3    dilTIAZem ER (TIAZAC) 360 mg capsule Take 360 mg by mouth daily.       spironolactone (ALDACTONE) 25 mg tablet Take 1 Tablet by mouth Every morning. (Patient not taking: Reported on 1/26/2022) 90 Tablet 0         PHYSICAL EXAM  Visit Vitals  /77 (BP 1 Location: Left arm, BP Patient Position: Sitting, BP Cuff Size: Adult)   Pulse 73   Temp 98.4 °F (36.9 °C) (Oral)   Resp 18   Ht 5' 5\" (1.651 m)   Wt 213 lb (96.6 kg)   SpO2 96%   BMI 35.45 kg/m²       General: Obese, no distress. HEENT:  Head normocephalic/atraumatic, no scleral icterus  Lungs:  Clear to ausculation bilaterally. Good air movement. Heart:  Regular rate and rhythm, normal S1 and S2, no murmur, gallop, or rub  Extremities: No clubbing, cyanosis, or edema. Neurological: Alert and oriented. Psychiatric: Normal mood and affect. Behavior is normal.         ASSESSMENT AND PLAN    ICD-10-CM ICD-9-CM    1. Essential hypertension  F12 954.9 METABOLIC PANEL, COMPREHENSIVE      CBC WITH AUTOMATED DIFF      METABOLIC PANEL, COMPREHENSIVE      CBC WITH AUTOMATED DIFF   2. Acute respiratory failure with hypoxia (HCC)  J96.01 518.81    3. Stage 3a chronic kidney disease (HCC)  N18.31 585.3    4. Acquired hypothyroidism  E03.9 244.9 TSH 3RD GENERATION      T4, FREE      TSH 3RD GENERATION      T4, FREE   5. IGT (impaired glucose tolerance)  R73.02 790.22 HEMOGLOBIN A1C WITH EAG      HEMOGLOBIN A1C WITH EAG   6. Mixed hyperlipidemia  E78.2 272.2 LIPID PANEL      LIPID PANEL   7. Vitamin D deficiency  E55.9 268.9 VITAMIN D, 25 HYDROXY      VITAMIN D, 25 HYDROXY     Diagnoses and all orders for this visit:    1. Essential hypertension  Controlled on losartan-HCTZ and diltiazem. -     METABOLIC PANEL, COMPREHENSIVE; Future  -     CBC WITH AUTOMATED DIFF; Future    2. Acute respiratory failure with hypoxia (HCC)  Improving. Keep scheduled appointment at pulmonary clinic next week. 3. Stage 3a chronic kidney disease ([facility] [address]    4. Acquired hypothyroidism  -     TSH 3RD GENERATION; Future  -     T4, FREE; Future    5. IGT (impaired glucose tolerance)  -     HEMOGLOBIN A1C WITH EAG; Future    6. Mixed hyperlipidemia  -     LIPID PANEL; Future    7. Vitamin D deficiency  -     VITAMIN D, 25 HYDROXY; Future      Follow-up and Dispositions    · Return in about 4 months (around 7/25/2022), or if symptoms worsen or fail to improve, for HTN, chol, thyroid; have fasting labs done 1 week before next appointment. I have discussed the diagnosis with the patient and the intended plan as seen in the above orders. Patient is in agreement. The patient has received an after-visit summary and questions were answered concerning future plans. I have discussed medication side effects and warnings with the patient as well.

## 2022-03-25 NOTE — PROGRESS NOTES
Identified pt with two pt identifiers(name and ). Reviewed record in preparation for visit and have obtained necessary documentation. Chief Complaint   Patient presents with    Hypertension    Breathing Problem     shortness of breath        Vitals:    22 1340   BP: 134/77   Pulse: 73   Resp: 18   Temp: 98.4 °F (36.9 °C)   TempSrc: Oral   SpO2: 96%   Weight: 213 lb (96.6 kg)   Height: 5' 5\" (1.651 m)   PainSc:   0 - No pain       Health Maintenance Due   Topic    Cervical cancer screen     Breast Cancer Screen Mammogram     COVID-19 Vaccine (3 - Booster for IPXI series)       Coordination of Care Questionnaire:  :   1) Have you been to an emergency room, urgent care, or hospitalized since your last visit? If yes, where when, and reason for visit? no       2. Have seen or consulted any other health care provider since your last visit? If yes, where when, and reason for visit? NO      Patient is accompanied by self I have received verbal consent from Nikita Hi to discuss any/all medical information while they are present in the room.

## 2022-03-29 ENCOUNTER — TRANSCRIBE ORDER (OUTPATIENT)
Dept: CARDIAC REHAB | Age: 62
End: 2022-03-29

## 2022-03-29 ENCOUNTER — TRANSCRIBE ORDER (OUTPATIENT)
Dept: SCHEDULING | Age: 62
End: 2022-03-29

## 2022-03-29 DIAGNOSIS — J12.82 PNEUMONIA DUE TO CORONAVIRUS DISEASE 2019 (CODE): Primary | ICD-10-CM

## 2022-03-29 DIAGNOSIS — U09.9 POST-COVID SYNDROME: Primary | ICD-10-CM

## 2022-03-30 ENCOUNTER — TRANSCRIBE ORDER (OUTPATIENT)
Dept: SCHEDULING | Age: 62
End: 2022-03-30

## 2022-03-30 DIAGNOSIS — U09.9 POST-COVID SYNDROME: Primary | ICD-10-CM

## 2022-04-01 ENCOUNTER — TELEPHONE (OUTPATIENT)
Dept: INTERNAL MEDICINE CLINIC | Age: 62
End: 2022-04-01

## 2022-04-01 NOTE — TELEPHONE ENCOUNTER
----- Message from Ashlee Patel sent at 4/1/2022 10:40 AM EDT -----  Subject: Message to Provider    QUESTIONS  Information for Provider? Willa Love,  of [place], is offering   assistance. Her number is [phone].  ---------------------------------------------------------------------------  --------------  Shandra KAN  What is the best way for the office to contact you? OK to leave message on   voicemail  Preferred Call Back Phone Number?  [phone]  ---------------------------------------------------------------------------  --------------  SCRIPT ANSWERS  undefined

## 2022-04-01 NOTE — TELEPHONE ENCOUNTER
Fax received from [facility] requesting records, last office note faxed to [phone], confirmation received.

## 2022-04-22 ENCOUNTER — HOSPITAL ENCOUNTER (OUTPATIENT)
Dept: CARDIAC REHAB | Age: 62
Discharge: HOME | End: 2022-04-22
Payer: COMMERCIAL

## 2022-04-22 VITALS — BODY MASS INDEX: 35.99 KG/M2 | WEIGHT: 216 LBS | HEIGHT: 65 IN

## 2022-04-22 NOTE — CARDIO/PULMONARY
Patient was unable to complete 6 min walk test due to Hypertension. Repeated BP 3 times with 5 mins in between and each time BP was greater than 170/100. Patient takes her BP medication at night but will start to take it in the morning before Pulmonary Rehab.

## 2022-04-28 ENCOUNTER — HOSPITAL ENCOUNTER (OUTPATIENT)
Dept: CT IMAGING | Age: 62
Discharge: HOME | End: 2022-04-28
Attending: NURSE PRACTITIONER
Payer: COMMERCIAL

## 2022-04-28 ENCOUNTER — HOSPITAL ENCOUNTER (OUTPATIENT)
Dept: CARDIAC REHAB | Age: 62
Discharge: HOME | End: 2022-04-28
Payer: COMMERCIAL

## 2022-04-28 ENCOUNTER — APPOINTMENT (OUTPATIENT)
Dept: CARDIAC REHAB | Age: 62
End: 2022-04-28

## 2022-04-28 VITALS — BODY MASS INDEX: 36.11 KG/M2 | WEIGHT: 217 LBS

## 2022-04-28 DIAGNOSIS — U09.9 POST-COVID SYNDROME: ICD-10-CM

## 2022-04-28 PROCEDURE — 71250 CT THORAX DX C-: CPT

## 2022-04-28 PROCEDURE — 94625 PHY/QHP OP PULM RHB W/O MNTR: CPT

## 2022-04-28 NOTE — CARDIO/PULMONARY
[facility]    Pulmonary Rehabilitation Program    Intake Appointment  2022           NAME: Pascual Dumont : 1960 AGE: 58 y.o. GENDER: female    PULMONARY REHAB ADMITTING DIAGNOSIS: COVID-19 [U07.1]    REFERRING PHYSICIAN: Precious Zheng MD    MEDICAL HX:  Past Medical History:   Diagnosis Date    Anxiety disorder     Arthritis     knee arthritis    COVID-19 2021    Depression     Fatigue     GERD (gastroesophageal reflux disease)     Headache     Heart disease     Hiatal hernia     Hypertension     Ischemic colon ([address] 2009    Kidney disease     Leg swelling     LVH (left ventricular hypertrophy) due to hypertensive disease     [facility]   [facility] Morbid obesity ([facility] [address]     Postmenopausal     Short of breath on exertion     Thyroid disease     TIA (transient ischemic attack)     Venous stasis     Vertigo        LABS:     Lab Results   Component Value Date/Time    Hemoglobin A1c 6.1 (H) 2021 03:42 PM     Lab Results   Component Value Date/Time    Cholesterol, total 227 (H) 2021 03:42 PM    HDL Cholesterol 67 2021 03:42 PM    LDL, calculated 128 (H) 2021 03:42 PM    LDL, calculated 78 10/03/2019 10:06 AM    VLDL, calculated 32 2021 03:42 PM    VLDL, calculated 33 10/03/2019 10:06 AM    Triglyceride 186 (H) 2021 03:42 PM         MEDICATIONS/SUPPLEMENTS:     Prior to Admission medications    Medication Sig Start Date End Date Taking? Authorizing Provider   Euthyrox 100 mcg tablet TAKE 1 TABLET BY MOUTH ONCE DAILY BEFORE BREAKFAST 22   Jason Raymond MD   nitroglycerin (NITROSTAT) 0.4 mg SL tablet TAKE ONE TABLET UNDER THE TONGUE EVERY 5 MINUTES, 3 TIMES FOR CHEST PAIN AS NEEDED  Patient not taking: Reported on 2022   Provider, Historical   Oxygen     Provider, Historical   losartan-hydroCHLOROthiazide (HYZAAR) 100-25 mg per tablet Take 1 Tablet by mouth Every morning.  22   Jason Raymond MD dilTIAZem ER (TIAZAC) 360 mg capsule Take 360 mg by mouth daily. Provider, Historical   pantoprazole (PROTONIX) 40 mg tablet Take 1 Tablet by mouth daily. 12/2/21   Chioma Wheeler MD   linaCLOtide (Linzess) 72 mcg cap capsule Take 1 Cap by mouth Daily (before breakfast). For chronic constipation 4/28/21   Chioma Wheeler MD   FLUoxetine 60 mg tab 60 mg. 3/26/20   Provider, Historical   buPROPion XL (WELLBUTRIN XL) 300 mg XL tablet Take 1 Tab by mouth every morning. 9/18/19   Chioma Wheeler MD   QUEtiapine (SEROQUEL) 50 mg tablet Take 2 Tabs by mouth nightly. 8/8/19   Chioma Wheeler MD   simethicone (GAS-X PO) Take  by mouth. Provider, Historical   LORazepam (ATIVAN) 0.5 mg tablet TAKE ONE BY MOUTH DAILY AS NEEDED FOR ANXIETY 6/7/19   Chioma Wheeler MD       ANTHROPOMETRICS:      Ht Readings from Last 1 Encounters:   04/22/22 5' 5\" (1.651 m)      Wt Readings from Last 1 Encounters:   04/28/22 98.4 kg (217 lb)        WAIST: 46.5    SCREENING SCORES:                       CAT:  24  Dyspnea:  68  Rate Your Plate:  45  PHQ-9:  19       VISIT SUMMARY:    Delfina Bowie 58 y.o. presented to [facility] Pulmonary Rehab for program orientation and 6 minute walk test today with a primary diagnosis of COVID-19 [U07.1]. Tulio Larson has a history that includes: Former smoker, hypertension and post covid pneumonia. Pulmonary/Cardiac risk factors include smoking/ tobacco exposure, obesity, hypertension, stress. Tulio Larson is  and lives with her . PHQ9, depression score, is 19   and this is considered high. The result was discussed with patient who affirms score to be accurate and PHQ 9 was faxed to MD.   Patient denied chest pain or SOB during 6 minute walk test and was in SR/BBB  . Patient walked 354 meters meters at a speed of 2.2   and grade of 0   for a final MET level of 2.5. Exercise prescription developed around patient stated goals, to be supplemented with home exercise recommendations.  Education manual given to patient and reviewed. Patient will attend pulmonary rehab 2 times/week and also plans to participate in educational classes.         Mikki Roque, RT

## 2022-04-29 ENCOUNTER — APPOINTMENT (OUTPATIENT)
Dept: CARDIAC REHAB | Age: 62
End: 2022-04-29

## 2022-05-05 ENCOUNTER — APPOINTMENT (OUTPATIENT)
Dept: CARDIAC REHAB | Age: 62
End: 2022-05-05

## 2022-05-06 ENCOUNTER — APPOINTMENT (OUTPATIENT)
Dept: CARDIAC REHAB | Age: 62
End: 2022-05-06

## 2022-05-12 ENCOUNTER — APPOINTMENT (OUTPATIENT)
Dept: CARDIAC REHAB | Age: 62
End: 2022-05-12

## 2022-05-13 ENCOUNTER — APPOINTMENT (OUTPATIENT)
Dept: CARDIAC REHAB | Age: 62
End: 2022-05-13

## 2022-05-19 ENCOUNTER — APPOINTMENT (OUTPATIENT)
Dept: CARDIAC REHAB | Age: 62
End: 2022-05-19

## 2022-05-20 ENCOUNTER — APPOINTMENT (OUTPATIENT)
Dept: CARDIAC REHAB | Age: 62
End: 2022-05-20

## 2022-05-26 ENCOUNTER — APPOINTMENT (OUTPATIENT)
Dept: CARDIAC REHAB | Age: 62
End: 2022-05-26

## 2022-05-26 ENCOUNTER — TELEPHONE (OUTPATIENT)
Dept: CARDIAC REHAB | Age: 62
End: 2022-05-26

## 2022-05-27 ENCOUNTER — APPOINTMENT (OUTPATIENT)
Dept: CARDIAC REHAB | Age: 62
End: 2022-05-27

## 2022-06-02 ENCOUNTER — APPOINTMENT (OUTPATIENT)
Dept: CARDIAC REHAB | Age: 62
End: 2022-06-02

## 2022-06-03 ENCOUNTER — APPOINTMENT (OUTPATIENT)
Dept: CARDIAC REHAB | Age: 62
End: 2022-06-03

## 2022-06-09 ENCOUNTER — APPOINTMENT (OUTPATIENT)
Dept: CARDIAC REHAB | Age: 62
End: 2022-06-09

## 2022-06-10 ENCOUNTER — APPOINTMENT (OUTPATIENT)
Dept: CARDIAC REHAB | Age: 62
End: 2022-06-10

## 2022-06-16 ENCOUNTER — APPOINTMENT (OUTPATIENT)
Dept: CARDIAC REHAB | Age: 62
End: 2022-06-16

## 2022-06-17 ENCOUNTER — APPOINTMENT (OUTPATIENT)
Dept: CARDIAC REHAB | Age: 62
End: 2022-06-17

## 2022-06-23 ENCOUNTER — APPOINTMENT (OUTPATIENT)
Dept: CARDIAC REHAB | Age: 62
End: 2022-06-23

## 2022-06-24 ENCOUNTER — APPOINTMENT (OUTPATIENT)
Dept: CARDIAC REHAB | Age: 62
End: 2022-06-24

## 2022-06-30 ENCOUNTER — APPOINTMENT (OUTPATIENT)
Dept: CARDIAC REHAB | Age: 62
End: 2022-06-30

## 2022-07-01 ENCOUNTER — APPOINTMENT (OUTPATIENT)
Dept: CARDIAC REHAB | Age: 62
End: 2022-07-01

## 2022-07-07 ENCOUNTER — APPOINTMENT (OUTPATIENT)
Dept: CARDIAC REHAB | Age: 62
End: 2022-07-07

## 2022-07-08 ENCOUNTER — APPOINTMENT (OUTPATIENT)
Dept: CARDIAC REHAB | Age: 62
End: 2022-07-08

## 2022-07-14 ENCOUNTER — APPOINTMENT (OUTPATIENT)
Dept: CARDIAC REHAB | Age: 62
End: 2022-07-14

## 2022-07-15 ENCOUNTER — APPOINTMENT (OUTPATIENT)
Dept: CARDIAC REHAB | Age: 62
End: 2022-07-15

## 2022-10-30 PROBLEM — M85.852 OSTEOPENIA OF NECK OF LEFT FEMUR: Status: ACTIVE | Noted: 2022-10-30

## 2023-03-23 DIAGNOSIS — E03.9 ACQUIRED HYPOTHYROIDISM: ICD-10-CM

## 2023-03-23 RX ORDER — LEVOTHYROXINE SODIUM 100 UG/1
100 TABLET ORAL
Qty: 30 TABLET | Refills: 2 | Status: SHIPPED | OUTPATIENT
Start: 2023-03-23

## 2023-03-23 NOTE — TELEPHONE ENCOUNTER
PCP: Iris Richey MD     Last appt: 3/25/2022     Future Appointments   Date Time Provider Raul Jacobo   6/15/2023  8:10 AM Iris Richey MD [facility] BS AMB          Requested Prescriptions     Pending Prescriptions Disp Refills    levothyroxine (SYNTHROID) 100 mcg tablet 30 Tablet 0     Sig: Take 1 Tablet by mouth Daily (before breakfast).

## 2023-03-23 NOTE — TELEPHONE ENCOUNTER
Called pt verified name and . Informed pt that the rx levothyroxine  was discontinued. Pt stated that she was on the meds but had not taking them in a long time. Checked pt chart and it shows last discontinued by Dr. Kamari Garcia. Pt said she was in the hospital with covid and double pneumonia for two weeks of the date the Dr. Soto Salines her off. Pt is wanting a refill because she says she has not been feeling too well and she believes it is her thyroid problem. Pt does have an appt coming in  with lab order she says she will have done when she comes in.

## 2023-03-25 ENCOUNTER — TELEPHONE (OUTPATIENT)
Dept: INTERNAL MEDICINE CLINIC | Age: 63
End: 2023-03-25

## 2023-03-25 RX ORDER — DILTIAZEM HYDROCHLORIDE 360 MG/1
360 CAPSULE, EXTENDED RELEASE ORAL DAILY
Qty: 90 CAPSULE | Refills: 0 | Status: SHIPPED | OUTPATIENT
Start: 2023-03-25 | End: 2023-03-26 | Stop reason: SDUPTHER

## 2023-06-08 RX ORDER — PANTOPRAZOLE SODIUM 40 MG/1
TABLET, DELAYED RELEASE ORAL
Qty: 30 TABLET | Refills: 3 | Status: SHIPPED | OUTPATIENT
Start: 2023-06-08

## 2023-06-08 NOTE — TELEPHONE ENCOUNTER
PCP: Clarisse Hendricks MD     Last appt:  3/25/2022      Future Appointments   Date Time Provider Nette Mahoney   8/25/2023  9:10 AM Clarisse Hendricks MD [facility] BS AMB          Requested Prescriptions     Pending Prescriptions Disp Refills    pantoprazole (PROTONIX) 40 MG tablet [Pharmacy Med Name: Pantoprazole Sodium 40 MG Oral Tablet Delayed Release] 30 tablet 0     Sig: Take 1 tablet by mouth once daily

## 2023-08-14 ENCOUNTER — TELEPHONE (OUTPATIENT)
Facility: CLINIC | Age: 63
End: 2023-08-14

## 2023-08-14 NOTE — TELEPHONE ENCOUNTER
Called pt verified name and . Pt had to reschedule appt a few times and wanted to know if she needs her Rx refilled before her appt could they be refilled.  Informed pt that it would be under the providers discretion wether to refill Rx

## 2023-08-14 NOTE — TELEPHONE ENCOUNTER
----- Message from [address] [place] [address] sent at 8/14/2023 10:21 AM EDT -----  Subject: Message to Provider    QUESTIONS  Information for Provider? Pt wants to talk to a nurse about rescheduling   appt, med refills. Please call pt  ---------------------------------------------------------------------------  --------------  Katelin Crabtree INFO  [phone]; OK to leave message on voicemail  ---------------------------------------------------------------------------  --------------  SCRIPT ANSWERS  Relationship to Patient?  Self

## 2023-09-25 RX ORDER — PANTOPRAZOLE SODIUM 40 MG/1
TABLET, DELAYED RELEASE ORAL
Qty: 30 TABLET | Refills: 5 | Status: SHIPPED | OUTPATIENT
Start: 2023-09-25

## 2023-11-03 LAB
BASOPHILS # BLD AUTO: 0 X10E3/UL (ref 0–0.2)
BASOPHILS NFR BLD AUTO: 0 %
EOSINOPHIL # BLD AUTO: 0.1 X10E3/UL (ref 0–0.4)
EOSINOPHIL NFR BLD AUTO: 1 %
ERYTHROCYTE [DISTWIDTH] IN BLOOD BY AUTOMATED COUNT: 18.5 % (ref 11.7–15.4)
HCT VFR BLD AUTO: 37.9 % (ref 34–46.6)
HGB BLD-MCNC: 11.9 G/DL (ref 11.1–15.9)
IMM GRANULOCYTES # BLD AUTO: 0 X10E3/UL (ref 0–0.1)
IMM GRANULOCYTES NFR BLD AUTO: 0 %
LYMPHOCYTES # BLD AUTO: 1.7 X10E3/UL (ref 0.7–3.1)
LYMPHOCYTES NFR BLD AUTO: 23 %
MCH RBC QN AUTO: 25.7 PG (ref 26.6–33)
MCHC RBC AUTO-ENTMCNC: 31.4 G/DL (ref 31.5–35.7)
MCV RBC AUTO: 82 FL (ref 79–97)
MONOCYTES # BLD AUTO: 0.4 X10E3/UL (ref 0.1–0.9)
MONOCYTES NFR BLD AUTO: 5 %
NEUTROPHILS # BLD AUTO: 5.2 X10E3/UL (ref 1.4–7)
NEUTROPHILS NFR BLD AUTO: 71 %
PLATELET # BLD AUTO: 344 X10E3/UL (ref 150–450)
RBC # BLD AUTO: 4.63 X10E6/UL (ref 3.77–5.28)
WBC # BLD AUTO: 7.4 X10E3/UL (ref 3.4–10.8)

## 2023-11-04 LAB
25(OH)D3+25(OH)D2 SERPL-MCNC: 14.2 NG/ML (ref 30–100)
ALBUMIN SERPL-MCNC: 4.1 G/DL (ref 3.9–4.9)
ALBUMIN/GLOB SERPL: 1.6 {RATIO} (ref 1.2–2.2)
ALP SERPL-CCNC: 111 IU/L (ref 44–121)
ALT SERPL-CCNC: 27 IU/L (ref 0–32)
AST SERPL-CCNC: 18 IU/L (ref 0–40)
BILIRUB SERPL-MCNC: 0.3 MG/DL (ref 0–1.2)
BUN SERPL-MCNC: 18 MG/DL (ref 8–27)
BUN/CREAT SERPL: 14 (ref 12–28)
CALCIUM SERPL-MCNC: 9.6 MG/DL (ref 8.7–10.3)
CHLORIDE SERPL-SCNC: 105 MMOL/L (ref 96–106)
CHOLEST SERPL-MCNC: 259 MG/DL (ref 100–199)
CO2 SERPL-SCNC: 25 MMOL/L (ref 20–29)
CREAT SERPL-MCNC: 1.28 MG/DL (ref 0.57–1)
EGFRCR SERPLBLD CKD-EPI 2021: 47 ML/MIN/1.73
EST. AVERAGE GLUCOSE BLD GHB EST-MCNC: 126 MG/DL
GLOBULIN SER CALC-MCNC: 2.5 G/DL (ref 1.5–4.5)
GLUCOSE SERPL-MCNC: 104 MG/DL (ref 70–99)
HBA1C MFR BLD: 6 % (ref 4.8–5.6)
HDLC SERPL-MCNC: 96 MG/DL
LDLC SERPL CALC-MCNC: 138 MG/DL (ref 0–99)
POTASSIUM SERPL-SCNC: 4.1 MMOL/L (ref 3.5–5.2)
PROT SERPL-MCNC: 6.6 G/DL (ref 6–8.5)
SODIUM SERPL-SCNC: 144 MMOL/L (ref 134–144)
T4 FREE SERPL-MCNC: 0.92 NG/DL (ref 0.82–1.77)
TRIGL SERPL-MCNC: 144 MG/DL (ref 0–149)
TSH SERPL DL<=0.005 MIU/L-ACNC: 3.41 UIU/ML (ref 0.45–4.5)
VLDLC SERPL CALC-MCNC: 25 MG/DL (ref 5–40)

## 2023-11-09 ENCOUNTER — OFFICE VISIT (OUTPATIENT)
Facility: CLINIC | Age: 63
End: 2023-11-09

## 2023-11-09 VITALS
TEMPERATURE: 98.4 F | BODY MASS INDEX: 35.29 KG/M2 | HEART RATE: 66 BPM | HEIGHT: 65 IN | OXYGEN SATURATION: 95 % | SYSTOLIC BLOOD PRESSURE: 186 MMHG | DIASTOLIC BLOOD PRESSURE: 112 MMHG | WEIGHT: 211.8 LBS | RESPIRATION RATE: 16 BRPM

## 2023-11-09 DIAGNOSIS — I10 ESSENTIAL HYPERTENSION: ICD-10-CM

## 2023-11-09 DIAGNOSIS — I16.0 HYPERTENSIVE URGENCY: Primary | ICD-10-CM

## 2023-11-09 DIAGNOSIS — E03.9 ACQUIRED HYPOTHYROIDISM: ICD-10-CM

## 2023-11-09 DIAGNOSIS — R30.0 DYSURIA: ICD-10-CM

## 2023-11-09 DIAGNOSIS — Z23 NEEDS FLU SHOT: ICD-10-CM

## 2023-11-09 LAB
BILIRUBIN, URINE, POC: NEGATIVE
BLOOD URINE, POC: NEGATIVE
GLUCOSE URINE, POC: NEGATIVE
KETONES, URINE, POC: NORMAL
LEUKOCYTE ESTERASE, URINE, POC: NORMAL
NITRITE, URINE, POC: NEGATIVE
PH, URINE, POC: 6.5 (ref 4.6–8)
PROTEIN,URINE, POC: 30
SPECIFIC GRAVITY, URINE, POC: 1.02 (ref 1–1.03)
URINALYSIS CLARITY, POC: NORMAL
URINALYSIS COLOR, POC: NORMAL
UROBILINOGEN, POC: NORMAL

## 2023-11-09 RX ORDER — LOSARTAN POTASSIUM AND HYDROCHLOROTHIAZIDE 12.5; 5 MG/1; MG/1
1 TABLET ORAL DAILY
Qty: 30 TABLET | Refills: 1 | Status: SHIPPED | OUTPATIENT
Start: 2023-11-09

## 2023-11-09 SDOH — ECONOMIC STABILITY: HOUSING INSECURITY
IN THE LAST 12 MONTHS, WAS THERE A TIME WHEN YOU DID NOT HAVE A STEADY PLACE TO SLEEP OR SLEPT IN A SHELTER (INCLUDING NOW)?: NO

## 2023-11-09 SDOH — ECONOMIC STABILITY: FOOD INSECURITY: WITHIN THE PAST 12 MONTHS, THE FOOD YOU BOUGHT JUST DIDN'T LAST AND YOU DIDN'T HAVE MONEY TO GET MORE.: NEVER TRUE

## 2023-11-09 SDOH — ECONOMIC STABILITY: FOOD INSECURITY: WITHIN THE PAST 12 MONTHS, YOU WORRIED THAT YOUR FOOD WOULD RUN OUT BEFORE YOU GOT MONEY TO BUY MORE.: NEVER TRUE

## 2023-11-09 SDOH — ECONOMIC STABILITY: TRANSPORTATION INSECURITY
IN THE PAST 12 MONTHS, HAS LACK OF TRANSPORTATION KEPT YOU FROM MEETINGS, WORK, OR FROM GETTING THINGS NEEDED FOR DAILY LIVING?: NO

## 2023-11-09 SDOH — ECONOMIC STABILITY: INCOME INSECURITY: HOW HARD IS IT FOR YOU TO PAY FOR THE VERY BASICS LIKE FOOD, HOUSING, MEDICAL CARE, AND HEATING?: NOT HARD AT ALL

## 2023-11-09 ASSESSMENT — PATIENT HEALTH QUESTIONNAIRE - PHQ9
SUM OF ALL RESPONSES TO PHQ QUESTIONS 1-9: 15
3. TROUBLE FALLING OR STAYING ASLEEP: 0
10. IF YOU CHECKED OFF ANY PROBLEMS, HOW DIFFICULT HAVE THESE PROBLEMS MADE IT FOR YOU TO DO YOUR WORK, TAKE CARE OF THINGS AT HOME, OR GET ALONG WITH OTHER PEOPLE: 3
SUM OF ALL RESPONSES TO PHQ9 QUESTIONS 1 & 2: 6
7. TROUBLE CONCENTRATING ON THINGS, SUCH AS READING THE NEWSPAPER OR WATCHING TELEVISION: 3
9. THOUGHTS THAT YOU WOULD BE BETTER OFF DEAD, OR OF HURTING YOURSELF: 0
5. POOR APPETITE OR OVEREATING: 3
SUM OF ALL RESPONSES TO PHQ QUESTIONS 1-9: 15
4. FEELING TIRED OR HAVING LITTLE ENERGY: 2
6. FEELING BAD ABOUT YOURSELF - OR THAT YOU ARE A FAILURE OR HAVE LET YOURSELF OR YOUR FAMILY DOWN: 1
2. FEELING DOWN, DEPRESSED OR HOPELESS: 3
SUM OF ALL RESPONSES TO PHQ QUESTIONS 1-9: 15
8. MOVING OR SPEAKING SO SLOWLY THAT OTHER PEOPLE COULD HAVE NOTICED. OR THE OPPOSITE, BEING SO FIGETY OR RESTLESS THAT YOU HAVE BEEN MOVING AROUND A LOT MORE THAN USUAL: 0
SUM OF ALL RESPONSES TO PHQ QUESTIONS 1-9: 15
1. LITTLE INTEREST OR PLEASURE IN DOING THINGS: 3

## 2023-11-27 ENCOUNTER — TELEPHONE (OUTPATIENT)
Facility: CLINIC | Age: 63
End: 2023-11-27

## 2023-11-27 NOTE — TELEPHONE ENCOUNTER
Spoke to Pt verified name and . Pt is aware if episode happens again to call and make apt to come in.

## 2023-11-27 NOTE — TELEPHONE ENCOUNTER
Spoke to pt verified name and . Pt stated on 2023 family members noticed she would stare off with a blank expression on her face. Family member's also noticed she was confused, leg swelling. Pt states she has been having headaches since last apt with us. Pt states leg swelling has improved and she is feeling 'Fine\" now. Advised her to go to ER to be evaluated. Pt declined. I let her know I would send a message to Dr. Juana Hutchins and see if there is anything else she would want her to do.

## 2024-05-22 RX ORDER — DILTIAZEM HYDROCHLORIDE 360 MG/1
360 CAPSULE, EXTENDED RELEASE ORAL DAILY
Qty: 90 CAPSULE | Refills: 0 | Status: SHIPPED | OUTPATIENT
Start: 2024-05-22

## 2024-05-22 NOTE — TELEPHONE ENCOUNTER
PCP: Darby Atkinson MD     Last appt:  11/9/2023    No future appointments.       Requested Prescriptions     Pending Prescriptions Disp Refills    dilTIAZem (TIAZAC) 360 MG extended release capsule [Pharmacy Med Name: dilTIAZem HCl ER Beads 360 MG Oral Capsule Extended Release 24 Hour] 90 capsule 0     Sig: Take 1 capsule by mouth once daily

## 2025-05-18 ENCOUNTER — OFFICE VISIT (OUTPATIENT)
Age: 65
End: 2025-05-18

## 2025-05-18 VITALS
WEIGHT: 191 LBS | TEMPERATURE: 97.8 F | RESPIRATION RATE: 16 BRPM | OXYGEN SATURATION: 98 % | SYSTOLIC BLOOD PRESSURE: 151 MMHG | HEART RATE: 70 BPM | DIASTOLIC BLOOD PRESSURE: 85 MMHG | BODY MASS INDEX: 31.78 KG/M2

## 2025-05-18 DIAGNOSIS — K04.7 DENTAL INFECTION: ICD-10-CM

## 2025-05-18 DIAGNOSIS — I10 HYPERTENSION, UNSPECIFIED TYPE: ICD-10-CM

## 2025-05-18 DIAGNOSIS — K08.89 PAIN, DENTAL: Primary | ICD-10-CM

## 2025-05-18 RX ORDER — KETOROLAC TROMETHAMINE 30 MG/ML
15 INJECTION, SOLUTION INTRAMUSCULAR; INTRAVENOUS ONCE
Status: COMPLETED | OUTPATIENT
Start: 2025-05-18 | End: 2025-05-18

## 2025-05-18 RX ORDER — EMPAGLIFLOZIN 10 MG/1
10 TABLET, FILM COATED ORAL EVERY MORNING
COMMUNITY
Start: 2025-05-13

## 2025-05-18 RX ADMIN — KETOROLAC TROMETHAMINE 15 MG: 30 INJECTION, SOLUTION INTRAMUSCULAR; INTRAVENOUS at 10:26

## 2025-06-13 ENCOUNTER — TRANSCRIBE ORDERS (OUTPATIENT)
Facility: HOSPITAL | Age: 65
End: 2025-06-13

## 2025-06-13 DIAGNOSIS — R93.89 ABNORMAL RADIOLOGICAL FINDINGS IN SKIN AND SUBCUTANEOUS TISSUE: Primary | ICD-10-CM

## 2025-06-19 ENCOUNTER — HOSPITAL ENCOUNTER (OUTPATIENT)
Facility: HOSPITAL | Age: 65
Discharge: HOME | End: 2025-06-22
Attending: INTERNAL MEDICINE
Payer: COMMERCIAL

## 2025-06-19 DIAGNOSIS — R93.89 ABNORMAL RADIOLOGICAL FINDINGS IN SKIN AND SUBCUTANEOUS TISSUE: ICD-10-CM

## 2025-06-19 PROCEDURE — 71250 CT THORAX DX C-: CPT
